# Patient Record
Sex: MALE | Race: WHITE
[De-identification: names, ages, dates, MRNs, and addresses within clinical notes are randomized per-mention and may not be internally consistent; named-entity substitution may affect disease eponyms.]

---

## 2017-03-30 ENCOUNTER — HOSPITAL ENCOUNTER (OUTPATIENT)
Dept: HOSPITAL 8 - RAD | Age: 82
Discharge: HOME | End: 2017-03-30
Attending: UROLOGY
Payer: MEDICARE

## 2017-03-30 DIAGNOSIS — Z87.442: ICD-10-CM

## 2017-03-30 DIAGNOSIS — N20.0: Primary | ICD-10-CM

## 2017-03-30 PROCEDURE — 74000: CPT

## 2017-06-05 ENCOUNTER — OFFICE VISIT (OUTPATIENT)
Dept: URGENT CARE | Facility: CLINIC | Age: 82
End: 2017-06-05
Payer: MEDICARE

## 2017-06-05 VITALS
BODY MASS INDEX: 27.17 KG/M2 | OXYGEN SATURATION: 96 % | HEART RATE: 68 BPM | SYSTOLIC BLOOD PRESSURE: 132 MMHG | WEIGHT: 205 LBS | RESPIRATION RATE: 16 BRPM | DIASTOLIC BLOOD PRESSURE: 70 MMHG | HEIGHT: 73 IN | TEMPERATURE: 98.4 F

## 2017-06-05 DIAGNOSIS — S99.929A INJURY OF NAIL BED OF TOE: ICD-10-CM

## 2017-06-05 PROCEDURE — 4040F PNEUMOC VAC/ADMIN/RCVD: CPT | Mod: 8P | Performed by: PHYSICIAN ASSISTANT

## 2017-06-05 PROCEDURE — G8432 DEP SCR NOT DOC, RNG: HCPCS | Performed by: PHYSICIAN ASSISTANT

## 2017-06-05 PROCEDURE — 1036F TOBACCO NON-USER: CPT | Performed by: PHYSICIAN ASSISTANT

## 2017-06-05 PROCEDURE — 99202 OFFICE O/P NEW SF 15 MIN: CPT | Performed by: PHYSICIAN ASSISTANT

## 2017-06-05 PROCEDURE — 1101F PT FALLS ASSESS-DOCD LE1/YR: CPT | Mod: 8P | Performed by: PHYSICIAN ASSISTANT

## 2017-06-05 PROCEDURE — 11750 EXCISION NAIL&NAIL MATRIX: CPT | Performed by: PHYSICIAN ASSISTANT

## 2017-06-05 PROCEDURE — G8419 CALC BMI OUT NRM PARAM NOF/U: HCPCS | Performed by: PHYSICIAN ASSISTANT

## 2017-06-05 RX ORDER — ENALAPRIL MALEATE 2.5 MG/1
2.5 TABLET ORAL DAILY
COMMUNITY
End: 2022-01-01

## 2017-06-05 ASSESSMENT — ENCOUNTER SYMPTOMS
COUGH: 0
NECK PAIN: 0
BACK PAIN: 1
CHILLS: 0
HEADACHES: 0
JOINT SWELLING: 0
CHANGE IN BOWEL HABIT: 0
MYALGIAS: 0
ABDOMINAL PAIN: 0

## 2017-06-05 NOTE — MR AVS SNAPSHOT
"        Delonte SILVA Catherine   2017 11:45 AM   Office Visit   MRN: [id number]    Department:  [facility]   Dept Phone:  [phone]    Description:  Male : 1935   Provider:  Simon Moreau PA-C           Reason for Visit     Nail Problem right foot big toe fungus issue    Fall right side back pain that moves down right hip      Allergies as of 2017     No Known Allergies      Vital Signs     Blood Pressure Pulse Temperature Respirations Height Weight    132/70 mmHg 68 36.9 °C (98.4 °F) 16 1.854 m (6' 1\") 92.987 kg (205 lb)    Body Mass Index Oxygen Saturation                27.05 kg/m2 96%          Basic Information     Date Of Birth Sex Race Ethnicity Preferred Language    1935 Male White Non- English      Health Maintenance     Patient has no pending health maintenance at this time      Current Immunizations     No immunizations on file.      Below and/or attached are the medications your provider expects you to take. Review all of your home medications and newly ordered medications with your provider and/or pharmacist. Follow medication instructions as directed by your provider and/or pharmacist. Please keep your medication list with you and share with your provider. Update the information when medications are discontinued, doses are changed, or new medications (including over-the-counter products) are added; and carry medication information at all times in the event of emergency situations     Allergies:  No Known Allergies          Medications  Valid as of: 2017 - 12:40 PM    Generic Name Brand Name Tablet Size Instructions for use    Enalapril Maleate (Tab) VASOTEC 2.5 MG Take 2.5 mg by mouth every day.        .                 Medicines prescribed today were sent to:     [facility]/PHARMACY #[id number] - [place] - [address]    [address] [place] [address]    Phone: [phone] Fax: [phone]    Open 24 Hours?: No      Medication refill instructions:       If your " prescription bottle indicates you have medication refills left, it is not necessary to call your provider’s office. Please contact your pharmacy and they will refill your medication.    If your prescription bottle indicates you do not have any refills left, you may request refills at any time through one of the following ways: The online Lore system (except Urgent Care), by calling your provider’s office, or by asking your pharmacy to contact your provider’s office with a refill request. Medication refills are processed only during regular business hours and may not be available until the next business day. Your provider may request additional information or to have a follow-up visit with you prior to refilling your medication.   *Please Note: Medication refills are assigned a new Rx number when refilled electronically. Your pharmacy may indicate that no refills were authorized even though a new prescription for the same medication is available at the pharmacy. Please request the medicine by name with the pharmacy before contacting your provider for a refill.           Lore Access Code: [email or web address]  Expires: 7/5/2017 12:40 PM    Your email address is not on file at [facility].  Email Addresses are required for you to sign up for Lore, please contact [phone] to verify your personal information and to provide your email address prior to attempting to register for Lore.    Delonte Alexander  [address] [place] [address]    Lore  A secure, online tool to manage your health information     [facility] [employer]’s Lore® is a secure, online tool that connects you to your personalized health information from the privacy of your home -- day or night - making it very easy for you to manage your healthcare. Once the activation process is completed, you can even access your medical information using the Lore poli, which is available for free in the Apple Poli store or Google Play store.     To learn  more about [employer], visit [email or web address]    There are two levels of access available (as shown below):   My Chart Features  [facility] Doctor [facility]  Specialists [facility]  Urgent  Care Non-[facility] Primary Care Doctor   Email your healthcare team securely and privately 24/7 X X X    Manage appointments: schedule your next appointment; view details of past/upcoming appointments X      Request prescription refills. X      View recent personal medical records, including lab and immunizations X X X X   View health record, including health history, allergies, medications X X X X   Read reports about your outpatient visits, procedures, consult and ER notes X X X X   See your discharge summary, which is a recap of your hospital and/or ER visit that includes your diagnosis, lab results, and care plan X X  X     How to register for Leaders2020:  Once your e-mail address has been verified, follow the following steps to sign up for [email or web address].     1. Go to  [email or web address]  2. Click on the Sign Up Now box, which takes you to the New Member Sign Up page. You will need to provide the following information:  a. Enter your [email or web address] Access Code exactly as it appears at the top of this page. (You will not need to use this code after you’ve completed the sign-up process. If you do not sign up before the expiration date, you must request a new code.)   b. Enter your date of birth.   c. Enter your home email address.   d. Click Submit, and follow the next screen’s instructions.  3. Create a Leaders2020 ID. This will be your Leaders2020 login ID and cannot be changed, so think of one that is secure and easy to remember.  4. Create a Leaders2020 password. You can change your password at any time.  5. Enter your Password Reset Question and Answer. This can be used at a later time if you forget your password.   6. Enter your e-mail address. This allows you to receive e-mail notifications when new information is available in  Revegy.  7. Click Sign Up. You can now view your health information.    For assistance activating your Revegy account, call [phone]

## 2017-06-08 ENCOUNTER — OFFICE VISIT (OUTPATIENT)
Dept: URGENT CARE | Facility: CLINIC | Age: 82
End: 2017-06-08
Payer: MEDICARE

## 2017-06-08 VITALS
DIASTOLIC BLOOD PRESSURE: 80 MMHG | OXYGEN SATURATION: 96 % | RESPIRATION RATE: 15 BRPM | TEMPERATURE: 98.3 F | SYSTOLIC BLOOD PRESSURE: 122 MMHG | HEART RATE: 73 BPM

## 2017-06-08 DIAGNOSIS — I10 ESSENTIAL HYPERTENSION: ICD-10-CM

## 2017-06-08 DIAGNOSIS — S91.209D TOENAIL AVULSION, SUBSEQUENT ENCOUNTER: ICD-10-CM

## 2017-06-08 PROCEDURE — 1101F PT FALLS ASSESS-DOCD LE1/YR: CPT | Mod: 8P | Performed by: FAMILY MEDICINE

## 2017-06-08 PROCEDURE — 4040F PNEUMOC VAC/ADMIN/RCVD: CPT | Mod: 8P | Performed by: FAMILY MEDICINE

## 2017-06-08 PROCEDURE — G8419 CALC BMI OUT NRM PARAM NOF/U: HCPCS | Performed by: FAMILY MEDICINE

## 2017-06-08 PROCEDURE — G8432 DEP SCR NOT DOC, RNG: HCPCS | Performed by: FAMILY MEDICINE

## 2017-06-08 PROCEDURE — 99214 OFFICE O/P EST MOD 30 MIN: CPT | Performed by: FAMILY MEDICINE

## 2017-06-08 PROCEDURE — 1036F TOBACCO NON-USER: CPT | Performed by: FAMILY MEDICINE

## 2017-06-08 RX ORDER — CEPHALEXIN 500 MG/1
500 CAPSULE ORAL 3 TIMES DAILY
Qty: 21 CAP | Refills: 0 | Status: SHIPPED | OUTPATIENT
Start: 2017-06-08 | End: 2017-06-15

## 2017-06-08 NOTE — PROGRESS NOTES
Subjective:      Delonte Alexander is a 82 y.o. male who presents with Wound Check   patient presents in follow-up after he had a partial toenail avulsion removed completely on Monday, June 5. He injured it initially on June 2. He has been changing the bandage. Applying some topical antibiotics denies any fevers or chills denies any increased pain feels that it is healing well.    Wound Check       ROS    PMH:  has no past medical history on file.  MEDS:   Current outpatient prescriptions:   •  cephALEXin (KEFLEX) 500 MG Cap, Take 1 Cap by mouth 3 times a day for 7 days., Disp: 21 Cap, Rfl: 0  •  enalapril (VASOTEC) 2.5 MG Tab, Take 2.5 mg by mouth every day., Disp: , Rfl:   ALLERGIES: No Known Allergies  SURGHX: No past surgical history on file.  SOCHX:  reports that he has never smoked. He does not have any smokeless tobacco history on file.  FH: Family history was reviewed, no pertinent findings to report   Objective:     /80 mmHg  Pulse 73  Temp(Src) 36.8 °C (98.3 °F)  Resp 15  SpO2 96%     Physical Exam   Constitutional: He is oriented to person, place, and time. He appears well-developed and well-nourished. No distress.   HENT:   Mouth/Throat: Oropharynx is clear and moist.   Cardiovascular: Normal rate.    Pulmonary/Chest: Effort normal.   Musculoskeletal: Normal range of motion. He exhibits no edema or tenderness.        Feet:    Neurological: He is alert and oriented to person, place, and time.   Skin: Skin is warm and dry. He is not diaphoretic.   Minimal erythema, no d/c   Psychiatric: He has a normal mood and affect. His behavior is normal.            Assessment/Plan:     1. Toenail avulsion, subsequent encounter    - cephALEXin (KEFLEX) 500 MG Cap; Take 1 Cap by mouth 3 times a day for 7 days.  Dispense: 21 Cap; Refill: 0  -Cont current care as area will be exposed for 4-6wks will cover with oral antibiotics for several days until initial granulation tissue appears      2. Essential  hypertension  Stable on medication continue as directed

## 2017-06-08 NOTE — MR AVS SNAPSHOT
Delonte Alexander   2017 9:00 AM   Office Visit   MRN: [id number]    Department:  [facility]   Dept Phone:  [phone]    Description:  Male : 1935   Provider:  Jessica Gutierrez D.O.           Reason for Visit     Wound Check right foot      Allergies as of 2017     No Known Allergies      You were diagnosed with     Toenail avulsion, subsequent encounter   [[id number]]       Essential hypertension   [1634479]         Vital Signs     Blood Pressure Pulse Temperature Respirations Oxygen Saturation Smoking Status    122/80 mmHg 73 36.8 °C (98.3 °F) 15 96% Never Smoker       Basic Information     Date Of Birth Sex Race Ethnicity Preferred Language    1935 Male White Non- English      Health Maintenance        Date Due Completion Dates    IMM DTaP/Tdap/Td Vaccine (1 - Tdap) 1955 ---    COLONOSCOPY 1986 ---    IMM ZOSTER VACCINE 1996 ---    IMM PNEUMOCOCCAL 65+ (ADULT) LOW/MEDIUM RISK SERIES (1 of 2 - PCV13) 2001 ---            Current Immunizations     No immunizations on file.      Below and/or attached are the medications your provider expects you to take. Review all of your home medications and newly ordered medications with your provider and/or pharmacist. Follow medication instructions as directed by your provider and/or pharmacist. Please keep your medication list with you and share with your provider. Update the information when medications are discontinued, doses are changed, or new medications (including over-the-counter products) are added; and carry medication information at all times in the event of emergency situations     Allergies:  No Known Allergies          Medications  Valid as of: 2017 -  9:20 AM    Generic Name Brand Name Tablet Size Instructions for use    Cephalexin (Cap) KEFLEX 500 MG Take 1 Cap by mouth 3 times a day for 7 days.        Enalapril Maleate (Tab) VASOTEC 2.5 MG Take 2.5 mg by mouth every day.        .                    Medicines prescribed today were sent to:     [facility]/PHARMACY #[id number] - [place], [place] - [address]    [address] [place] [address]    Phone: [phone] Fax: [phone]    Open 24 Hours?: No      Medication refill instructions:       If your prescription bottle indicates you have medication refills left, it is not necessary to call your provider’s office. Please contact your pharmacy and they will refill your medication.    If your prescription bottle indicates you do not have any refills left, you may request refills at any time through one of the following ways: The online Workhint system (except Urgent Care), by calling your provider’s office, or by asking your pharmacy to contact your provider’s office with a refill request. Medication refills are processed only during regular business hours and may not be available until the next business day. Your provider may request additional information or to have a follow-up visit with you prior to refilling your medication.   *Please Note: Medication refills are assigned a new Rx number when refilled electronically. Your pharmacy may indicate that no refills were authorized even though a new prescription for the same medication is available at the pharmacy. Please request the medicine by name with the pharmacy before contacting your provider for a refill.           Workhint Access Code: [email or web address]  Expires: 7/5/2017 12:40 PM    Your email address is not on file at [facility].  Email Addresses are required for you to sign up for Workhint, please contact [phone] to verify your personal information and to provide your email address prior to attempting to register for Workhint.    [place] [address]    Workhint  A secure, online tool to manage your health information     [employer]’s Workhint® is a secure, online tool that connects you to your personalized health information from the privacy of your home -- day or night -  making it very easy for you to manage your healthcare. Once the activation process is completed, you can even access your medical information using the FSAstore.com poli, which is available for free in the Apple Poli store or Google Play store.     To learn more about [email or web address], visit [email or web address]    There are two levels of access available (as shown below):   My Chart Features  [facility] Doctor [facility]  Specialists [facility]  Urgent  Care Non-[facility] Primary Care Doctor   Email your healthcare team securely and privately 24/7 X X X    Manage appointments: schedule your next appointment; view details of past/upcoming appointments X      Request prescription refills. X      View recent personal medical records, including lab and immunizations X X X X   View health record, including health history, allergies, medications X X X X   Read reports about your outpatient visits, procedures, consult and ER notes X X X X   See your discharge summary, which is a recap of your hospital and/or ER visit that includes your diagnosis, lab results, and care plan X X  X     How to register for [email or web address]:  Once your e-mail address has been verified, follow the following steps to sign up for [email or web address].     1. Go to  [email or web address]  2. Click on the Sign Up Now box, which takes you to the New Member Sign Up page. You will need to provide the following information:  a. Enter your [email or web address] Access Code exactly as it appears at the top of this page. (You will not need to use this code after you’ve completed the sign-up process. If you do not sign up before the expiration date, you must request a new code.)   b. Enter your date of birth.   c. Enter your home email address.   d. Click Submit, and follow the next screen’s instructions.  3. Create a MySQLt ID. This will be your FSAstore.com login ID and cannot be changed, so think of one that is secure and easy to remember.  4. Create a MySQLt password. You can change your password at  any time.  5. Enter your Password Reset Question and Answer. This can be used at a later time if you forget your password.   6. Enter your e-mail address. This allows you to receive e-mail notifications when new information is available in [employer]elligence.  7. Click Sign Up. You can now view your health information.    For assistance activating your [employer] account, call [phone]

## 2019-07-17 ENCOUNTER — HOSPITAL ENCOUNTER (OUTPATIENT)
Dept: RADIOLOGY | Facility: MEDICAL CENTER | Age: 84
End: 2019-07-17
Attending: FAMILY MEDICINE
Payer: MEDICARE

## 2019-07-17 DIAGNOSIS — M54.5 ACUTE LOW BACK PAIN, UNSPECIFIED BACK PAIN LATERALITY, WITH SCIATICA PRESENCE UNSPECIFIED: ICD-10-CM

## 2019-07-17 PROCEDURE — 72100 X-RAY EXAM L-S SPINE 2/3 VWS: CPT

## 2019-07-26 ENCOUNTER — HOSPITAL ENCOUNTER (OUTPATIENT)
Dept: HOSPITAL 8 - RAD | Age: 84
Discharge: HOME | End: 2019-07-26
Attending: UROLOGY
Payer: MEDICARE

## 2019-07-26 DIAGNOSIS — N20.0: Primary | ICD-10-CM

## 2019-07-26 DIAGNOSIS — Z87.442: ICD-10-CM

## 2019-07-26 DIAGNOSIS — I70.8: ICD-10-CM

## 2019-07-26 PROCEDURE — 74018 RADEX ABDOMEN 1 VIEW: CPT

## 2019-08-21 ENCOUNTER — HOSPITAL ENCOUNTER (OUTPATIENT)
Dept: RADIOLOGY | Facility: MEDICAL CENTER | Age: 84
End: 2019-08-21
Attending: NURSE PRACTITIONER
Payer: MEDICARE

## 2019-08-21 DIAGNOSIS — M48.56XA COLLAPSED VERTEBRA, NOT ELSEWHERE CLASSIFIED, LUMBAR REGION, INITIAL ENCOUNTER FOR FRACTURE (HCC): ICD-10-CM

## 2019-08-21 PROCEDURE — 72110 X-RAY EXAM L-2 SPINE 4/>VWS: CPT

## 2019-08-21 PROCEDURE — 72148 MRI LUMBAR SPINE W/O DYE: CPT

## 2021-01-14 DIAGNOSIS — Z23 NEED FOR VACCINATION: ICD-10-CM

## 2022-01-01 ENCOUNTER — APPOINTMENT (OUTPATIENT)
Dept: CARDIOLOGY | Facility: MEDICAL CENTER | Age: 87
DRG: 314 | End: 2022-01-01
Attending: NURSE PRACTITIONER
Payer: MEDICARE

## 2022-01-01 ENCOUNTER — HOSPICE ADMISSION (OUTPATIENT)
Dept: HOSPICE | Facility: HOSPICE | Age: 87
End: 2022-01-01
Payer: MEDICARE

## 2022-01-01 ENCOUNTER — TELEPHONE (OUTPATIENT)
Dept: CARDIOLOGY | Facility: MEDICAL CENTER | Age: 87
End: 2022-01-01

## 2022-01-01 ENCOUNTER — DOCUMENTATION (OUTPATIENT)
Dept: CARDIOLOGY | Facility: MEDICAL CENTER | Age: 87
End: 2022-01-01
Payer: MEDICARE

## 2022-01-01 ENCOUNTER — PHARMACY VISIT (OUTPATIENT)
Dept: PHARMACY | Facility: MEDICAL CENTER | Age: 87
End: 2022-01-01
Payer: COMMERCIAL

## 2022-01-01 ENCOUNTER — OFFICE VISIT (OUTPATIENT)
Dept: CARDIOLOGY | Facility: MEDICAL CENTER | Age: 87
End: 2022-01-01
Payer: MEDICARE

## 2022-01-01 ENCOUNTER — NON-PROVIDER VISIT (OUTPATIENT)
Dept: CARDIOLOGY | Facility: MEDICAL CENTER | Age: 87
End: 2022-01-01
Payer: MEDICARE

## 2022-01-01 ENCOUNTER — APPOINTMENT (OUTPATIENT)
Dept: RADIOLOGY | Facility: MEDICAL CENTER | Age: 87
DRG: 811 | End: 2022-01-01
Payer: MEDICARE

## 2022-01-01 ENCOUNTER — APPOINTMENT (OUTPATIENT)
Dept: RADIOLOGY | Facility: MEDICAL CENTER | Age: 87
DRG: 314 | End: 2022-01-01
Attending: STUDENT IN AN ORGANIZED HEALTH CARE EDUCATION/TRAINING PROGRAM
Payer: MEDICARE

## 2022-01-01 ENCOUNTER — TELEPHONE (OUTPATIENT)
Dept: CARDIOLOGY | Facility: MEDICAL CENTER | Age: 87
End: 2022-01-01
Payer: MEDICARE

## 2022-01-01 ENCOUNTER — APPOINTMENT (OUTPATIENT)
Dept: CARDIOLOGY | Facility: MEDICAL CENTER | Age: 87
DRG: 811 | End: 2022-01-01
Attending: INTERNAL MEDICINE
Payer: MEDICARE

## 2022-01-01 ENCOUNTER — HOSPITAL ENCOUNTER (INPATIENT)
Facility: MEDICAL CENTER | Age: 87
LOS: 4 days | DRG: 811 | End: 2022-03-28
Attending: EMERGENCY MEDICINE | Admitting: HOSPITALIST
Payer: MEDICARE

## 2022-01-01 ENCOUNTER — HOSPITAL ENCOUNTER (OUTPATIENT)
Facility: MEDICAL CENTER | Age: 87
End: 2022-01-01
Attending: INTERNAL MEDICINE | Admitting: INTERNAL MEDICINE
Payer: MEDICARE

## 2022-01-01 ENCOUNTER — DOCUMENTATION (OUTPATIENT)
Dept: CARDIOLOGY | Facility: MEDICAL CENTER | Age: 87
End: 2022-01-01

## 2022-01-01 ENCOUNTER — HOSPITAL ENCOUNTER (OUTPATIENT)
Dept: LAB | Facility: MEDICAL CENTER | Age: 87
End: 2022-04-05
Attending: INTERNAL MEDICINE
Payer: MEDICARE

## 2022-01-01 ENCOUNTER — HOSPITAL ENCOUNTER (OUTPATIENT)
Dept: RADIOLOGY | Facility: MEDICAL CENTER | Age: 87
End: 2022-03-17
Attending: INTERNAL MEDICINE
Payer: MEDICARE

## 2022-01-01 ENCOUNTER — APPOINTMENT (OUTPATIENT)
Dept: RADIOLOGY | Facility: MEDICAL CENTER | Age: 87
DRG: 314 | End: 2022-01-01
Attending: EMERGENCY MEDICINE
Payer: MEDICARE

## 2022-01-01 ENCOUNTER — APPOINTMENT (OUTPATIENT)
Dept: RADIOLOGY | Facility: MEDICAL CENTER | Age: 87
DRG: 811 | End: 2022-01-01
Attending: STUDENT IN AN ORGANIZED HEALTH CARE EDUCATION/TRAINING PROGRAM
Payer: MEDICARE

## 2022-01-01 ENCOUNTER — APPOINTMENT (OUTPATIENT)
Dept: RADIOLOGY | Facility: MEDICAL CENTER | Age: 87
DRG: 811 | End: 2022-01-01
Attending: INTERNAL MEDICINE
Payer: MEDICARE

## 2022-01-01 ENCOUNTER — ANESTHESIA EVENT (OUTPATIENT)
Dept: SURGERY | Facility: MEDICAL CENTER | Age: 87
DRG: 811 | End: 2022-01-01
Payer: MEDICARE

## 2022-01-01 ENCOUNTER — HOSPITAL ENCOUNTER (INPATIENT)
Facility: MEDICAL CENTER | Age: 87
LOS: 2 days | DRG: 314 | End: 2022-04-12
Attending: EMERGENCY MEDICINE | Admitting: STUDENT IN AN ORGANIZED HEALTH CARE EDUCATION/TRAINING PROGRAM
Payer: MEDICARE

## 2022-01-01 ENCOUNTER — APPOINTMENT (OUTPATIENT)
Dept: RADIOLOGY | Facility: MEDICAL CENTER | Age: 87
DRG: 314 | End: 2022-01-01
Attending: HOSPITALIST
Payer: MEDICARE

## 2022-01-01 ENCOUNTER — APPOINTMENT (OUTPATIENT)
Dept: CARDIOLOGY | Facility: MEDICAL CENTER | Age: 87
DRG: 218 | End: 2022-01-01
Attending: INTERNAL MEDICINE
Payer: MEDICARE

## 2022-01-01 ENCOUNTER — PRE-ADMISSION TESTING (OUTPATIENT)
Dept: ADMISSIONS | Facility: MEDICAL CENTER | Age: 87
DRG: 218 | End: 2022-01-01
Attending: INTERNAL MEDICINE
Payer: MEDICARE

## 2022-01-01 ENCOUNTER — HOSPITAL ENCOUNTER (OUTPATIENT)
Facility: MEDICAL CENTER | Age: 87
End: 2022-01-11
Payer: COMMERCIAL

## 2022-01-01 ENCOUNTER — OFFICE VISIT (OUTPATIENT)
Dept: CARDIOTHORACIC SURGERY | Facility: MEDICAL CENTER | Age: 87
End: 2022-01-01
Payer: MEDICARE

## 2022-01-01 ENCOUNTER — APPOINTMENT (OUTPATIENT)
Dept: RADIOLOGY | Facility: MEDICAL CENTER | Age: 87
DRG: 811 | End: 2022-01-01
Attending: EMERGENCY MEDICINE
Payer: MEDICARE

## 2022-01-01 ENCOUNTER — ANESTHESIA (OUTPATIENT)
Dept: SURGERY | Facility: MEDICAL CENTER | Age: 87
DRG: 811 | End: 2022-01-01
Payer: MEDICARE

## 2022-01-01 ENCOUNTER — HOME CARE VISIT (OUTPATIENT)
Dept: HOSPICE | Facility: HOSPICE | Age: 87
End: 2022-01-01
Payer: MEDICARE

## 2022-01-01 ENCOUNTER — APPOINTMENT (OUTPATIENT)
Dept: CARDIOLOGY | Facility: MEDICAL CENTER | Age: 87
DRG: 314 | End: 2022-01-01
Attending: EMERGENCY MEDICINE
Payer: MEDICARE

## 2022-01-01 ENCOUNTER — HOSPITAL ENCOUNTER (INPATIENT)
Facility: MEDICAL CENTER | Age: 87
LOS: 1 days | DRG: 218 | End: 2022-03-22
Attending: INTERNAL MEDICINE | Admitting: STUDENT IN AN ORGANIZED HEALTH CARE EDUCATION/TRAINING PROGRAM
Payer: MEDICARE

## 2022-01-01 ENCOUNTER — HOSPITAL ENCOUNTER (OUTPATIENT)
Dept: LAB | Facility: MEDICAL CENTER | Age: 87
DRG: 811 | End: 2022-03-23
Attending: NURSE PRACTITIONER
Payer: MEDICARE

## 2022-01-01 ENCOUNTER — APPOINTMENT (OUTPATIENT)
Dept: CARDIOLOGY | Facility: MEDICAL CENTER | Age: 87
End: 2022-01-01
Attending: INTERNAL MEDICINE
Payer: MEDICARE

## 2022-01-01 VITALS
TEMPERATURE: 97.5 F | DIASTOLIC BLOOD PRESSURE: 62 MMHG | WEIGHT: 186 LBS | HEIGHT: 72 IN | OXYGEN SATURATION: 94 % | SYSTOLIC BLOOD PRESSURE: 128 MMHG | BODY MASS INDEX: 25.19 KG/M2 | HEART RATE: 69 BPM

## 2022-01-01 VITALS
BODY MASS INDEX: 25.47 KG/M2 | WEIGHT: 188.05 LBS | SYSTOLIC BLOOD PRESSURE: 126 MMHG | HEIGHT: 72 IN | DIASTOLIC BLOOD PRESSURE: 60 MMHG | HEART RATE: 70 BPM | TEMPERATURE: 96.9 F | OXYGEN SATURATION: 95 % | RESPIRATION RATE: 27 BRPM

## 2022-01-01 VITALS
RESPIRATION RATE: 16 BRPM | HEART RATE: 83 BPM | HEIGHT: 72 IN | SYSTOLIC BLOOD PRESSURE: 110 MMHG | DIASTOLIC BLOOD PRESSURE: 52 MMHG | BODY MASS INDEX: 24.52 KG/M2 | OXYGEN SATURATION: 91 % | WEIGHT: 181 LBS

## 2022-01-01 VITALS
HEIGHT: 72 IN | RESPIRATION RATE: 16 BRPM | OXYGEN SATURATION: 91 % | DIASTOLIC BLOOD PRESSURE: 74 MMHG | BODY MASS INDEX: 24.52 KG/M2 | WEIGHT: 181 LBS | HEART RATE: 70 BPM | SYSTOLIC BLOOD PRESSURE: 126 MMHG

## 2022-01-01 VITALS
HEART RATE: 78 BPM | RESPIRATION RATE: 16 BRPM | DIASTOLIC BLOOD PRESSURE: 68 MMHG | WEIGHT: 186 LBS | OXYGEN SATURATION: 90 % | SYSTOLIC BLOOD PRESSURE: 122 MMHG | BODY MASS INDEX: 25.19 KG/M2 | HEIGHT: 72 IN

## 2022-01-01 VITALS
OXYGEN SATURATION: 88 % | DIASTOLIC BLOOD PRESSURE: 51 MMHG | BODY MASS INDEX: 27.47 KG/M2 | TEMPERATURE: 97.2 F | HEART RATE: 61 BPM | HEIGHT: 72 IN | RESPIRATION RATE: 18 BRPM | WEIGHT: 202.82 LBS | SYSTOLIC BLOOD PRESSURE: 121 MMHG

## 2022-01-01 VITALS
BODY MASS INDEX: 26.19 KG/M2 | OXYGEN SATURATION: 96 % | HEART RATE: 80 BPM | RESPIRATION RATE: 36 BRPM | HEIGHT: 72 IN | WEIGHT: 193.34 LBS | DIASTOLIC BLOOD PRESSURE: 47 MMHG | TEMPERATURE: 97.8 F | SYSTOLIC BLOOD PRESSURE: 136 MMHG

## 2022-01-01 DIAGNOSIS — I25.84 CORONARY ARTERY DISEASE DUE TO CALCIFIED CORONARY LESION: ICD-10-CM

## 2022-01-01 DIAGNOSIS — Z01.810 PRE-PROCEDURAL CARDIOVASCULAR EXAMINATION: ICD-10-CM

## 2022-01-01 DIAGNOSIS — I35.0 SEVERE AORTIC STENOSIS: ICD-10-CM

## 2022-01-01 DIAGNOSIS — I25.10 CORONARY ARTERY DISEASE DUE TO CALCIFIED CORONARY LESION: ICD-10-CM

## 2022-01-01 DIAGNOSIS — I44.7 LBBB (LEFT BUNDLE BRANCH BLOCK): ICD-10-CM
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
## 2022-01-01 DIAGNOSIS — D64.9 ANEMIA, UNSPECIFIED TYPE: ICD-10-CM

## 2022-01-01 DIAGNOSIS — R00.1 BRADYCARDIA: ICD-10-CM

## 2022-01-01 DIAGNOSIS — I35.0 NONRHEUMATIC AORTIC VALVE STENOSIS: ICD-10-CM

## 2022-01-01 DIAGNOSIS — I71.20 THORACIC AORTIC ANEURYSM WITHOUT RUPTURE (HCC): ICD-10-CM

## 2022-01-01 DIAGNOSIS — I35.0 AORTIC VALVE STENOSIS, ETIOLOGY OF CARDIAC VALVE DISEASE UNSPECIFIED: ICD-10-CM

## 2022-01-01 DIAGNOSIS — I73.9 PERIPHERAL VASCULAR DISEASE (HCC): ICD-10-CM

## 2022-01-01 DIAGNOSIS — Z98.890 STATUS POST CARDIAC CATHETERIZATION: ICD-10-CM

## 2022-01-01 DIAGNOSIS — R60.9 EDEMA, UNSPECIFIED TYPE: ICD-10-CM

## 2022-01-01 DIAGNOSIS — M79.605 LOWER EXTREMITY PAIN, BILATERAL: ICD-10-CM

## 2022-01-01 DIAGNOSIS — I44.0 FIRST DEGREE AV BLOCK: ICD-10-CM

## 2022-01-01 DIAGNOSIS — J96.01 ACUTE RESPIRATORY FAILURE WITH HYPOXEMIA (HCC): ICD-10-CM

## 2022-01-01 DIAGNOSIS — Q24.8 LEFT VENTRICULAR OUTFLOW TRACT OBSTRUCTION: ICD-10-CM

## 2022-01-01 DIAGNOSIS — I47.10 SVT (SUPRAVENTRICULAR TACHYCARDIA) (HCC): ICD-10-CM

## 2022-01-01 DIAGNOSIS — I25.10 CAD, MULTIPLE VESSEL: ICD-10-CM

## 2022-01-01 DIAGNOSIS — G89.29 CHRONIC BACK PAIN, UNSPECIFIED BACK LOCATION, UNSPECIFIED BACK PAIN LATERALITY: ICD-10-CM

## 2022-01-01 DIAGNOSIS — R09.02 HYPOXIA: ICD-10-CM

## 2022-01-01 DIAGNOSIS — I25.10 3-VESSEL CAD: ICD-10-CM

## 2022-01-01 DIAGNOSIS — I49.1 PREMATURE ATRIAL CONTRACTIONS: ICD-10-CM

## 2022-01-01 DIAGNOSIS — J92.9 PLEURAL PLAQUE: ICD-10-CM

## 2022-01-01 DIAGNOSIS — E78.5 DYSLIPIDEMIA: ICD-10-CM

## 2022-01-01 DIAGNOSIS — I73.9 PAD (PERIPHERAL ARTERY DISEASE) (HCC): ICD-10-CM

## 2022-01-01 DIAGNOSIS — R54 FRAILTY: ICD-10-CM

## 2022-01-01 DIAGNOSIS — N28.9 RENAL INSUFFICIENCY: ICD-10-CM

## 2022-01-01 DIAGNOSIS — D50.0 IRON DEFICIENCY ANEMIA DUE TO CHRONIC BLOOD LOSS: ICD-10-CM

## 2022-01-01 DIAGNOSIS — I50.9 ACUTE CONGESTIVE HEART FAILURE, UNSPECIFIED HEART FAILURE TYPE (HCC): ICD-10-CM

## 2022-01-01 DIAGNOSIS — I25.10 CORONARY ARTERY DISEASE INVOLVING NATIVE CORONARY ARTERY OF NATIVE HEART WITHOUT ANGINA PECTORIS: ICD-10-CM

## 2022-01-01 DIAGNOSIS — M54.9 CHRONIC BACK PAIN, UNSPECIFIED BACK LOCATION, UNSPECIFIED BACK PAIN LATERALITY: ICD-10-CM

## 2022-01-01 DIAGNOSIS — Z95.5 STATUS POST INSERTION OF DRUG ELUTING CORONARY ARTERY STENT: ICD-10-CM

## 2022-01-01 DIAGNOSIS — Z00.6 EXAMINATION OF PARTICIPANT IN CLINICAL TRIAL: ICD-10-CM

## 2022-01-01 DIAGNOSIS — M79.604 LOWER EXTREMITY PAIN, BILATERAL: ICD-10-CM

## 2022-01-01 DIAGNOSIS — I34.2 NONRHEUMATIC MITRAL VALVE STENOSIS: ICD-10-CM

## 2022-01-01 DIAGNOSIS — K92.2 GASTROINTESTINAL HEMORRHAGE, UNSPECIFIED GASTROINTESTINAL HEMORRHAGE TYPE: ICD-10-CM

## 2022-01-01 LAB
25(OH)D3 SERPL-MCNC: 75 NG/ML (ref 30–100)
ABO + RH BLD: NORMAL
ABO GROUP BLD: NORMAL
ABO GROUP BLD: NORMAL
ACT BLD: 160 SEC (ref 74–137)
ACT BLD: 214 SEC (ref 74–137)
ACT BLD: 231 SEC (ref 74–137)
ACT BLD: 243 SEC (ref 74–137)
ACT BLD: 255 SEC (ref 74–137)
ACT BLD: 279 SEC (ref 74–137)
ACT BLD: 279 SEC (ref 74–137)
ACT BLD: 297 SEC (ref 74–137)
ACT BLD: 321 SEC (ref 74–137)
ACT BLD: 321 SEC (ref 74–137)
ACT BLD: 339 SEC (ref 74–137)
ACT BLD: 345 SEC (ref 74–137)
ACT BLD: 374 SEC (ref 74–137)
ALBUMIN SERPL BCP-MCNC: 3.5 G/DL (ref 3.2–4.9)
ALBUMIN SERPL BCP-MCNC: 3.6 G/DL (ref 3.2–4.9)
ALBUMIN SERPL BCP-MCNC: 3.7 G/DL (ref 3.2–4.9)
ALBUMIN SERPL BCP-MCNC: 3.9 G/DL (ref 3.2–4.9)
ALBUMIN SERPL BCP-MCNC: 3.9 G/DL (ref 3.2–4.9)
ALBUMIN/GLOB SERPL: 1.3 G/DL
ALBUMIN/GLOB SERPL: 1.5 G/DL
ALBUMIN/GLOB SERPL: 1.5 G/DL
ALBUMIN/GLOB SERPL: 1.6 G/DL
ALBUMIN/GLOB SERPL: 2.3 G/DL
ALP SERPL-CCNC: 58 U/L (ref 30–99)
ALP SERPL-CCNC: 61 U/L (ref 30–99)
ALP SERPL-CCNC: 66 U/L (ref 30–99)
ALP SERPL-CCNC: 69 U/L (ref 30–99)
ALP SERPL-CCNC: 77 U/L (ref 30–99)
ALT SERPL-CCNC: 13 U/L (ref 2–50)
ALT SERPL-CCNC: 13 U/L (ref 2–50)
ALT SERPL-CCNC: 17 U/L (ref 2–50)
ALT SERPL-CCNC: 18 U/L (ref 2–50)
ALT SERPL-CCNC: 20 U/L (ref 2–50)
ANION GAP SERPL CALC-SCNC: 10 MMOL/L (ref 7–16)
ANION GAP SERPL CALC-SCNC: 10 MMOL/L (ref 7–16)
ANION GAP SERPL CALC-SCNC: 11 MMOL/L (ref 7–16)
ANION GAP SERPL CALC-SCNC: 11 MMOL/L (ref 7–16)
ANION GAP SERPL CALC-SCNC: 12 MMOL/L (ref 7–16)
ANION GAP SERPL CALC-SCNC: 14 MMOL/L (ref 7–16)
ANISOCYTOSIS BLD QL SMEAR: ABNORMAL
ANISOCYTOSIS BLD QL SMEAR: ABNORMAL
APPEARANCE FLD: NORMAL
APPEARANCE UR: CLEAR
APTT PPP: 37.4 SEC (ref 24.7–36)
APTT PPP: 38.5 SEC (ref 24.7–36)
AST SERPL-CCNC: 11 U/L (ref 12–45)
AST SERPL-CCNC: 15 U/L (ref 12–45)
AST SERPL-CCNC: 20 U/L (ref 12–45)
AST SERPL-CCNC: 21 U/L (ref 12–45)
AST SERPL-CCNC: 9 U/L (ref 12–45)
BACTERIA #/AREA URNS HPF: NEGATIVE /HPF
BARCODED ABORH UBTYP: 5100
BARCODED ABORH UBTYP: 9500
BARCODED ABORH UBTYP: 9500
BARCODED PRD CODE UBPRD: NORMAL
BARCODED UNIT NUM UBUNT: NORMAL
BASE EXCESS BLDA CALC-SCNC: -3 MMOL/L (ref -4–3)
BASOPHILS # BLD AUTO: 0.2 % (ref 0–1.8)
BASOPHILS # BLD AUTO: 0.4 % (ref 0–1.8)
BASOPHILS # BLD AUTO: 0.5 % (ref 0–1.8)
BASOPHILS # BLD AUTO: 0.5 % (ref 0–1.8)
BASOPHILS # BLD AUTO: 0.7 % (ref 0–1.8)
BASOPHILS # BLD: 0.03 K/UL (ref 0–0.12)
BASOPHILS # BLD: 0.05 K/UL (ref 0–0.12)
BASOPHILS # BLD: 0.06 K/UL (ref 0–0.12)
BILIRUB SERPL-MCNC: 0.4 MG/DL (ref 0.1–1.5)
BILIRUB SERPL-MCNC: 0.4 MG/DL (ref 0.1–1.5)
BILIRUB SERPL-MCNC: 0.5 MG/DL (ref 0.1–1.5)
BILIRUB SERPL-MCNC: 0.5 MG/DL (ref 0.1–1.5)
BILIRUB SERPL-MCNC: 0.7 MG/DL (ref 0.1–1.5)
BILIRUB UR QL STRIP.AUTO: NEGATIVE
BLD GP AB SCN SERPL QL: NORMAL
BLD GP AB SCN SERPL QL: NORMAL
BODY FLD TYPE: NORMAL
BODY TEMPERATURE: ABNORMAL DEGREES
BUN SERPL-MCNC: 35 MG/DL (ref 8–22)
BUN SERPL-MCNC: 40 MG/DL (ref 8–22)
BUN SERPL-MCNC: 41 MG/DL (ref 8–22)
BUN SERPL-MCNC: 42 MG/DL (ref 8–22)
BUN SERPL-MCNC: 42 MG/DL (ref 8–22)
BUN SERPL-MCNC: 43 MG/DL (ref 8–22)
BUN SERPL-MCNC: 52 MG/DL (ref 8–22)
BUN SERPL-MCNC: 69 MG/DL (ref 8–22)
CA-I BLD ISE-SCNC: 1.24 MMOL/L (ref 1.1–1.3)
CALCIUM SERPL-MCNC: 8.7 MG/DL (ref 8.5–10.5)
CALCIUM SERPL-MCNC: 8.7 MG/DL (ref 8.5–10.5)
CALCIUM SERPL-MCNC: 8.8 MG/DL (ref 8.5–10.5)
CALCIUM SERPL-MCNC: 8.8 MG/DL (ref 8.5–10.5)
CALCIUM SERPL-MCNC: 9 MG/DL (ref 8.5–10.5)
CALCIUM SERPL-MCNC: 9.1 MG/DL (ref 8.5–10.5)
CALCIUM SERPL-MCNC: 9.2 MG/DL (ref 8.5–10.5)
CALCIUM SERPL-MCNC: 9.3 MG/DL (ref 8.5–10.5)
CFT BLD TEG: 5.3 MIN (ref 4.6–9.1)
CFT P HPASE BLD TEG: 4.5 MIN (ref 4.3–8.3)
CHLORIDE SERPL-SCNC: 100 MMOL/L (ref 96–112)
CHLORIDE SERPL-SCNC: 101 MMOL/L (ref 96–112)
CHLORIDE SERPL-SCNC: 104 MMOL/L (ref 96–112)
CHLORIDE SERPL-SCNC: 104 MMOL/L (ref 96–112)
CHLORIDE SERPL-SCNC: 94 MMOL/L (ref 96–112)
CHLORIDE SERPL-SCNC: 95 MMOL/L (ref 96–112)
CHLORIDE SERPL-SCNC: 95 MMOL/L (ref 96–112)
CHLORIDE SERPL-SCNC: 98 MMOL/L (ref 96–112)
CLOT ANGLE BLD TEG: 77.8 DEGREES (ref 63–78)
CLOT LYSIS 30M P MA LENFR BLD TEG: 0.4 % (ref 0–2.6)
CO2 BLDA-SCNC: 28 MMOL/L (ref 20–33)
CO2 SERPL-SCNC: 23 MMOL/L (ref 20–33)
CO2 SERPL-SCNC: 25 MMOL/L (ref 20–33)
CO2 SERPL-SCNC: 25 MMOL/L (ref 20–33)
CO2 SERPL-SCNC: 27 MMOL/L (ref 20–33)
CO2 SERPL-SCNC: 28 MMOL/L (ref 20–33)
COLOR FLD: YELLOW
COLOR UR: YELLOW
COMMENT 1642: NORMAL
COMMENT 1642: NORMAL
COMPONENT R 8504R: NORMAL
COVID ORDER STATUS COVID19: NORMAL
CREAT SERPL-MCNC: 1.6 MG/DL (ref 0.5–1.4)
CREAT SERPL-MCNC: 1.67 MG/DL (ref 0.5–1.4)
CREAT SERPL-MCNC: 1.69 MG/DL (ref 0.5–1.4)
CREAT SERPL-MCNC: 1.81 MG/DL (ref 0.5–1.4)
CREAT SERPL-MCNC: 1.84 MG/DL (ref 0.5–1.4)
CREAT SERPL-MCNC: 1.91 MG/DL (ref 0.5–1.4)
CREAT SERPL-MCNC: 2.44 MG/DL (ref 0.5–1.4)
CREAT SERPL-MCNC: 3.49 MG/DL (ref 0.5–1.4)
CT.EXTRINSIC BLD ROTEM: 0.9 MIN (ref 0.8–2.1)
CYTOLOGY REG CYTOL: NORMAL
D DIMER PPP IA.FEU-MCNC: 2.39 UG/ML (FEU) (ref 0–0.5)
EKG IMPRESSION: NORMAL
EOSINOPHIL # BLD AUTO: 0.01 K/UL (ref 0–0.51)
EOSINOPHIL # BLD AUTO: 0.15 K/UL (ref 0–0.51)
EOSINOPHIL # BLD AUTO: 0.2 K/UL (ref 0–0.51)
EOSINOPHIL # BLD AUTO: 0.34 K/UL (ref 0–0.51)
EOSINOPHIL # BLD AUTO: 0.42 K/UL (ref 0–0.51)
EOSINOPHIL NFR BLD: 0.1 % (ref 0–6.9)
EOSINOPHIL NFR BLD: 1.5 % (ref 0–6.9)
EOSINOPHIL NFR BLD: 2.9 % (ref 0–6.9)
EOSINOPHIL NFR BLD: 4 % (ref 0–6.9)
EOSINOPHIL NFR BLD: 7.4 % (ref 0–6.9)
EPI CELLS #/AREA URNS HPF: NEGATIVE /HPF
ERYTHROCYTE [DISTWIDTH] IN BLOOD BY AUTOMATED COUNT: 47.8 FL (ref 35.9–50)
ERYTHROCYTE [DISTWIDTH] IN BLOOD BY AUTOMATED COUNT: 48.6 FL (ref 35.9–50)
ERYTHROCYTE [DISTWIDTH] IN BLOOD BY AUTOMATED COUNT: 49.2 FL (ref 35.9–50)
ERYTHROCYTE [DISTWIDTH] IN BLOOD BY AUTOMATED COUNT: 49.7 FL (ref 35.9–50)
ERYTHROCYTE [DISTWIDTH] IN BLOOD BY AUTOMATED COUNT: 50.3 FL (ref 35.9–50)
ERYTHROCYTE [DISTWIDTH] IN BLOOD BY AUTOMATED COUNT: 50.4 FL (ref 35.9–50)
ERYTHROCYTE [DISTWIDTH] IN BLOOD BY AUTOMATED COUNT: 50.7 FL (ref 35.9–50)
ERYTHROCYTE [DISTWIDTH] IN BLOOD BY AUTOMATED COUNT: 50.7 FL (ref 35.9–50)
ERYTHROCYTE [DISTWIDTH] IN BLOOD BY AUTOMATED COUNT: 51.1 FL (ref 35.9–50)
ERYTHROCYTE [DISTWIDTH] IN BLOOD BY AUTOMATED COUNT: 51.5 FL (ref 35.9–50)
FERRITIN SERPL-MCNC: 457 NG/ML (ref 22–322)
FLUAV RNA SPEC QL NAA+PROBE: NEGATIVE
FLUBV RNA SPEC QL NAA+PROBE: NEGATIVE
GFR SERPLBLD CREATININE-BSD FMLA CKD-EPI: 16 ML/MIN/1.73 M 2
GFR SERPLBLD CREATININE-BSD FMLA CKD-EPI: 25 ML/MIN/1.73 M 2
GFR SERPLBLD CREATININE-BSD FMLA CKD-EPI: 34 ML/MIN/1.73 M 2
GFR SERPLBLD CREATININE-BSD FMLA CKD-EPI: 35 ML/MIN/1.73 M 2
GFR SERPLBLD CREATININE-BSD FMLA CKD-EPI: 36 ML/MIN/1.73 M 2
GFR SERPLBLD CREATININE-BSD FMLA CKD-EPI: 39 ML/MIN/1.73 M 2
GFR SERPLBLD CREATININE-BSD FMLA CKD-EPI: 40 ML/MIN/1.73 M 2
GFR SERPLBLD CREATININE-BSD FMLA CKD-EPI: 42 ML/MIN/1.73 M 2
GIANT PLATELETS BLD QL SMEAR: NORMAL
GLOBULIN SER CALC-MCNC: 1.7 G/DL (ref 1.9–3.5)
GLOBULIN SER CALC-MCNC: 2.3 G/DL (ref 1.9–3.5)
GLOBULIN SER CALC-MCNC: 2.4 G/DL (ref 1.9–3.5)
GLOBULIN SER CALC-MCNC: 2.6 G/DL (ref 1.9–3.5)
GLOBULIN SER CALC-MCNC: 2.7 G/DL (ref 1.9–3.5)
GLUCOSE BLD STRIP.AUTO-MCNC: 158 MG/DL (ref 65–99)
GLUCOSE FLD-MCNC: 95 MG/DL
GLUCOSE SERPL-MCNC: 101 MG/DL (ref 65–99)
GLUCOSE SERPL-MCNC: 116 MG/DL (ref 65–99)
GLUCOSE SERPL-MCNC: 117 MG/DL (ref 65–99)
GLUCOSE SERPL-MCNC: 167 MG/DL (ref 65–99)
GLUCOSE SERPL-MCNC: 91 MG/DL (ref 65–99)
GLUCOSE SERPL-MCNC: 92 MG/DL (ref 65–99)
GLUCOSE SERPL-MCNC: 94 MG/DL (ref 65–99)
GLUCOSE SERPL-MCNC: 95 MG/DL (ref 65–99)
GLUCOSE UR STRIP.AUTO-MCNC: NEGATIVE MG/DL
GRAM STN SPEC: NORMAL
HCO3 BLDA-SCNC: 26.5 MMOL/L (ref 17–25)
HCT VFR BLD AUTO: 21.5 % (ref 42–52)
HCT VFR BLD AUTO: 23 % (ref 42–52)
HCT VFR BLD AUTO: 25.6 % (ref 42–52)
HCT VFR BLD AUTO: 26.6 % (ref 42–52)
HCT VFR BLD AUTO: 27.2 % (ref 42–52)
HCT VFR BLD AUTO: 28.6 % (ref 42–52)
HCT VFR BLD AUTO: 30.8 % (ref 42–52)
HCT VFR BLD AUTO: 31.4 % (ref 42–52)
HCT VFR BLD AUTO: 32.5 % (ref 42–52)
HCT VFR BLD AUTO: 35.9 % (ref 42–52)
HCT VFR BLD CALC: 42 % (ref 42–52)
HGB BLD-MCNC: 10 G/DL (ref 14–18)
HGB BLD-MCNC: 10.1 G/DL (ref 14–18)
HGB BLD-MCNC: 10.7 G/DL (ref 14–18)
HGB BLD-MCNC: 14.3 G/DL (ref 14–18)
HGB BLD-MCNC: 6.7 G/DL (ref 14–18)
HGB BLD-MCNC: 7.1 G/DL (ref 14–18)
HGB BLD-MCNC: 7.2 G/DL (ref 14–18)
HGB BLD-MCNC: 8.1 G/DL (ref 14–18)
HGB BLD-MCNC: 9 G/DL (ref 14–18)
HGB BLD-MCNC: 9.4 G/DL (ref 14–18)
HGB BLD-MCNC: 9.7 G/DL (ref 14–18)
HGB RETIC QN AUTO: 29.1 PG/CELL (ref 29–35)
HISTIOCYTES NFR FLD: 10 %
HYALINE CASTS #/AREA URNS LPF: ABNORMAL /LPF
IMM GRANULOCYTES # BLD AUTO: 0.03 K/UL (ref 0–0.11)
IMM GRANULOCYTES # BLD AUTO: 0.05 K/UL (ref 0–0.11)
IMM GRANULOCYTES # BLD AUTO: 0.05 K/UL (ref 0–0.11)
IMM GRANULOCYTES # BLD AUTO: 0.06 K/UL (ref 0–0.11)
IMM GRANULOCYTES # BLD AUTO: 0.06 K/UL (ref 0–0.11)
IMM GRANULOCYTES NFR BLD AUTO: 0.3 % (ref 0–0.9)
IMM GRANULOCYTES NFR BLD AUTO: 0.5 % (ref 0–0.9)
IMM GRANULOCYTES NFR BLD AUTO: 0.6 % (ref 0–0.9)
IMM GRANULOCYTES NFR BLD AUTO: 0.7 % (ref 0–0.9)
IMM GRANULOCYTES NFR BLD AUTO: 1.1 % (ref 0–0.9)
IMM RETICS NFR: 20.7 % (ref 9.3–17.4)
INR PPP: 1.16 (ref 0.87–1.13)
INR PPP: 1.21 (ref 0.87–1.13)
INR PPP: 1.55 (ref 0.87–1.13)
IRON SATN MFR SERPL: 13 % (ref 15–55)
IRON SATN MFR SERPL: 23 % (ref 15–55)
IRON SERPL-MCNC: 25 UG/DL (ref 50–180)
IRON SERPL-MCNC: 39 UG/DL (ref 50–180)
KETONES UR STRIP.AUTO-MCNC: NEGATIVE MG/DL
LDH FLD L TO P-CCNC: 136 U/L
LDH SERPL L TO P-CCNC: 208 U/L (ref 107–266)
LEUKOCYTE ESTERASE UR QL STRIP.AUTO: NEGATIVE
LIPASE SERPL-CCNC: 15 U/L (ref 11–82)
LV EJECT FRACT  99904: 55
LV EJECT FRACT MOD 2C 99903: 43.83
LV EJECT FRACT MOD 2C 99903: 53.17
LV EJECT FRACT MOD 4C 99902: 53.76
LV EJECT FRACT MOD 4C 99902: 54.39
LV EJECT FRACT MOD BP 99901: 51.28
LV EJECT FRACT MOD BP 99901: 53.47
LYMPHOCYTES # BLD AUTO: 0.29 K/UL (ref 1–4.8)
LYMPHOCYTES # BLD AUTO: 0.31 K/UL (ref 1–4.8)
LYMPHOCYTES # BLD AUTO: 0.34 K/UL (ref 1–4.8)
LYMPHOCYTES # BLD AUTO: 0.43 K/UL (ref 1–4.8)
LYMPHOCYTES # BLD AUTO: 0.56 K/UL (ref 1–4.8)
LYMPHOCYTES NFR BLD: 2.2 % (ref 22–41)
LYMPHOCYTES NFR BLD: 3.1 % (ref 22–41)
LYMPHOCYTES NFR BLD: 4.9 % (ref 22–41)
LYMPHOCYTES NFR BLD: 5 % (ref 22–41)
LYMPHOCYTES NFR BLD: 9.8 % (ref 22–41)
LYMPHOCYTES NFR FLD: 5 %
MACROCYTES BLD QL SMEAR: ABNORMAL
MACROCYTES BLD QL SMEAR: ABNORMAL
MAGNESIUM SERPL-MCNC: 1.4 MG/DL (ref 1.5–2.5)
MCF BLD TEG: 68.5 MM (ref 52–69)
MCF.PLATELET INHIB BLD ROTEM: 38.9 MM (ref 15–32)
MCH RBC QN AUTO: 28.3 PG (ref 27–33)
MCH RBC QN AUTO: 28.7 PG (ref 27–33)
MCH RBC QN AUTO: 28.8 PG (ref 27–33)
MCH RBC QN AUTO: 29 PG (ref 27–33)
MCH RBC QN AUTO: 29.1 PG (ref 27–33)
MCH RBC QN AUTO: 29.2 PG (ref 27–33)
MCH RBC QN AUTO: 29.2 PG (ref 27–33)
MCH RBC QN AUTO: 29.3 PG (ref 27–33)
MCHC RBC AUTO-ENTMCNC: 29.8 G/DL (ref 33.7–35.3)
MCHC RBC AUTO-ENTMCNC: 29.8 G/DL (ref 33.7–35.3)
MCHC RBC AUTO-ENTMCNC: 29.9 G/DL (ref 33.7–35.3)
MCHC RBC AUTO-ENTMCNC: 30.5 G/DL (ref 33.7–35.3)
MCHC RBC AUTO-ENTMCNC: 30.9 G/DL (ref 33.7–35.3)
MCHC RBC AUTO-ENTMCNC: 31.1 G/DL (ref 33.7–35.3)
MCHC RBC AUTO-ENTMCNC: 31.2 G/DL (ref 33.7–35.3)
MCHC RBC AUTO-ENTMCNC: 31.5 G/DL (ref 33.7–35.3)
MCHC RBC AUTO-ENTMCNC: 31.6 G/DL (ref 33.7–35.3)
MCHC RBC AUTO-ENTMCNC: 32.5 G/DL (ref 33.7–35.3)
MCV RBC AUTO: 89.3 FL (ref 81.4–97.8)
MCV RBC AUTO: 92.4 FL (ref 81.4–97.8)
MCV RBC AUTO: 92.9 FL (ref 81.4–97.8)
MCV RBC AUTO: 93.7 FL (ref 81.4–97.8)
MCV RBC AUTO: 93.9 FL (ref 81.4–97.8)
MCV RBC AUTO: 93.9 FL (ref 81.4–97.8)
MCV RBC AUTO: 95.1 FL (ref 81.4–97.8)
MCV RBC AUTO: 95.3 FL (ref 81.4–97.8)
MCV RBC AUTO: 96 FL (ref 81.4–97.8)
MCV RBC AUTO: 96.5 FL (ref 81.4–97.8)
MESOTHL CELL NFR FLD: 1 %
MICRO URNS: ABNORMAL
MICROCYTES BLD QL SMEAR: ABNORMAL
MICROCYTES BLD QL SMEAR: ABNORMAL
MONOCYTES # BLD AUTO: 0.49 K/UL (ref 0–0.85)
MONOCYTES # BLD AUTO: 0.58 K/UL (ref 0–0.85)
MONOCYTES # BLD AUTO: 0.66 K/UL (ref 0–0.85)
MONOCYTES # BLD AUTO: 1.07 K/UL (ref 0–0.85)
MONOCYTES # BLD AUTO: 1.14 K/UL (ref 0–0.85)
MONOCYTES NFR BLD AUTO: 10.2 % (ref 0–13.4)
MONOCYTES NFR BLD AUTO: 10.7 % (ref 0–13.4)
MONOCYTES NFR BLD AUTO: 7 % (ref 0–13.4)
MONOCYTES NFR BLD AUTO: 7.7 % (ref 0–13.4)
MONOCYTES NFR BLD AUTO: 8.7 % (ref 0–13.4)
MONONUC CELLS NFR FLD: 14 %
MORPHOLOGY BLD-IMP: NORMAL
MORPHOLOGY BLD-IMP: NORMAL
NEUTROPHILS # BLD AUTO: 11.61 K/UL (ref 1.82–7.42)
NEUTROPHILS # BLD AUTO: 4.06 K/UL (ref 1.82–7.42)
NEUTROPHILS # BLD AUTO: 5.89 K/UL (ref 1.82–7.42)
NEUTROPHILS # BLD AUTO: 6.98 K/UL (ref 1.82–7.42)
NEUTROPHILS # BLD AUTO: 8.4 K/UL (ref 1.82–7.42)
NEUTROPHILS NFR BLD: 71 % (ref 44–72)
NEUTROPHILS NFR BLD: 82 % (ref 44–72)
NEUTROPHILS NFR BLD: 83.9 % (ref 44–72)
NEUTROPHILS NFR BLD: 84.1 % (ref 44–72)
NEUTROPHILS NFR BLD: 88.3 % (ref 44–72)
NEUTROPHILS NFR FLD: 70 %
NITRITE UR QL STRIP.AUTO: NEGATIVE
NRBC # BLD AUTO: 0 K/UL
NRBC BLD-RTO: 0 /100 WBC
NT-PROBNP SERPL IA-MCNC: 7391 PG/ML (ref 0–125)
NT-PROBNP SERPL IA-MCNC: 9621 PG/ML (ref 0–125)
PATHOLOGY CONSULT NOTE: NORMAL
PCO2 BLDA: 65.5 MMHG (ref 26–37)
PH BLDA: 7.21 [PH] (ref 7.4–7.5)
PH FLD: 8 [PH]
PH UR STRIP.AUTO: 5 [PH] (ref 5–8)
PHOSPHATE SERPL-MCNC: 3.1 MG/DL (ref 2.5–4.5)
PLATELET # BLD AUTO: 101 K/UL (ref 164–446)
PLATELET # BLD AUTO: 102 K/UL (ref 164–446)
PLATELET # BLD AUTO: 123 K/UL (ref 164–446)
PLATELET # BLD AUTO: 127 K/UL (ref 164–446)
PLATELET # BLD AUTO: 152 K/UL (ref 164–446)
PLATELET # BLD AUTO: 156 K/UL (ref 164–446)
PLATELET # BLD AUTO: 85 K/UL (ref 164–446)
PLATELET # BLD AUTO: 95 K/UL (ref 164–446)
PLATELET # BLD AUTO: 96 K/UL (ref 164–446)
PLATELET # BLD AUTO: 98 K/UL (ref 164–446)
PLATELET BLD QL SMEAR: NORMAL
PLATELET BLD QL SMEAR: NORMAL
PMV BLD AUTO: 13.1 FL (ref 9–12.9)
PMV BLD AUTO: 13.6 FL (ref 9–12.9)
PMV BLD AUTO: 13.8 FL (ref 9–12.9)
PMV BLD AUTO: 13.9 FL (ref 9–12.9)
PMV BLD AUTO: 14.1 FL (ref 9–12.9)
PMV BLD AUTO: 14.3 FL (ref 9–12.9)
PMV BLD AUTO: 14.3 FL (ref 9–12.9)
PO2 BLDA: 116 MMHG (ref 64–87)
POLYCHROMASIA BLD QL SMEAR: NORMAL
POLYCHROMASIA BLD QL SMEAR: NORMAL
POTASSIUM BLD-SCNC: 4.4 MMOL/L (ref 3.6–5.5)
POTASSIUM SERPL-SCNC: 4.1 MMOL/L (ref 3.6–5.5)
POTASSIUM SERPL-SCNC: 4.5 MMOL/L (ref 3.6–5.5)
POTASSIUM SERPL-SCNC: 4.6 MMOL/L (ref 3.6–5.5)
POTASSIUM SERPL-SCNC: 4.6 MMOL/L (ref 3.6–5.5)
POTASSIUM SERPL-SCNC: 4.8 MMOL/L (ref 3.6–5.5)
POTASSIUM SERPL-SCNC: 5 MMOL/L (ref 3.6–5.5)
POTASSIUM SERPL-SCNC: 5.1 MMOL/L (ref 3.6–5.5)
POTASSIUM SERPL-SCNC: 5.9 MMOL/L (ref 3.6–5.5)
PROCALCITONIN SERPL-MCNC: 0.11 NG/ML
PRODUCT TYPE UPROD: NORMAL
PROT FLD-MCNC: 2.4 G/DL
PROT SERPL-MCNC: 5.6 G/DL (ref 6–8.2)
PROT SERPL-MCNC: 6 G/DL (ref 6–8.2)
PROT SERPL-MCNC: 6 G/DL (ref 6–8.2)
PROT SERPL-MCNC: 6.2 G/DL (ref 6–8.2)
PROT SERPL-MCNC: 6.5 G/DL (ref 6–8.2)
PROT UR QL STRIP: 30 MG/DL
PROTHROMBIN TIME: 14.5 SEC (ref 12–14.6)
PROTHROMBIN TIME: 14.9 SEC (ref 12–14.6)
PROTHROMBIN TIME: 18.1 SEC (ref 12–14.6)
RBC # BLD AUTO: 2.29 M/UL (ref 4.7–6.1)
RBC # BLD AUTO: 2.45 M/UL (ref 4.7–6.1)
RBC # BLD AUTO: 2.77 M/UL (ref 4.7–6.1)
RBC # BLD AUTO: 2.79 M/UL (ref 4.7–6.1)
RBC # BLD AUTO: 2.86 M/UL (ref 4.7–6.1)
RBC # BLD AUTO: 3.08 M/UL (ref 4.7–6.1)
RBC # BLD AUTO: 3.27 M/UL (ref 4.7–6.1)
RBC # BLD AUTO: 3.45 M/UL (ref 4.7–6.1)
RBC # BLD AUTO: 3.47 M/UL (ref 4.7–6.1)
RBC # BLD AUTO: 3.72 M/UL (ref 4.7–6.1)
RBC # FLD: 4000 CELLS/UL
RBC # URNS HPF: ABNORMAL /HPF
RBC BLD AUTO: PRESENT
RBC BLD AUTO: PRESENT
RBC UR QL AUTO: NEGATIVE
RETICS # AUTO: 0.03 M/UL (ref 0.04–0.06)
RETICS/RBC NFR: 1.2 % (ref 0.8–2.1)
RH BLD: NORMAL
RH BLD: NORMAL
RSV RNA SPEC QL NAA+PROBE: NEGATIVE
SAO2 % BLDA: 97 % (ref 93–99)
SARS-COV+SARS-COV-2 AG RESP QL IA.RAPID: NOTDETECTED
SARS-COV-2 RNA RESP QL NAA+PROBE: DETECTED
SARS-COV-2 RNA RESP QL NAA+PROBE: DETECTED
SIGNIFICANT IND 70042: NORMAL
SITE SITE: NORMAL
SODIUM BLD-SCNC: 137 MMOL/L (ref 135–145)
SODIUM SERPL-SCNC: 133 MMOL/L (ref 135–145)
SODIUM SERPL-SCNC: 135 MMOL/L (ref 135–145)
SODIUM SERPL-SCNC: 136 MMOL/L (ref 135–145)
SODIUM SERPL-SCNC: 136 MMOL/L (ref 135–145)
SODIUM SERPL-SCNC: 137 MMOL/L (ref 135–145)
SODIUM SERPL-SCNC: 137 MMOL/L (ref 135–145)
SODIUM SERPL-SCNC: 139 MMOL/L (ref 135–145)
SODIUM SERPL-SCNC: 139 MMOL/L (ref 135–145)
SOURCE SOURCE: NORMAL
SP GR UR STRIP.AUTO: 1.01
SPECIMEN DRAWN FROM PATIENT: ABNORMAL
SPECIMEN SOURCE: ABNORMAL
SPECIMEN SOURCE: ABNORMAL
SPECIMEN SOURCE: NORMAL
TEG ALGORITHM TGALG: ABNORMAL
TIBC SERPL-MCNC: 172 UG/DL (ref 250–450)
TIBC SERPL-MCNC: 187 UG/DL (ref 250–450)
TROPONIN T SERPL-MCNC: 104 NG/L (ref 6–19)
TROPONIN T SERPL-MCNC: 116 NG/L (ref 6–19)
TROPONIN T SERPL-MCNC: 311 NG/L (ref 6–19)
TROPONIN T SERPL-MCNC: 322 NG/L (ref 6–19)
TROPONIN T SERPL-MCNC: 331 NG/L (ref 6–19)
TROPONIN T SERPL-MCNC: 85 NG/L (ref 6–19)
TSH SERPL DL<=0.005 MIU/L-ACNC: 1.05 UIU/ML (ref 0.38–5.33)
UIBC SERPL-MCNC: 133 UG/DL (ref 110–370)
UIBC SERPL-MCNC: 162 UG/DL (ref 110–370)
UNIT STATUS USTAT: NORMAL
UROBILINOGEN UR STRIP.AUTO-MCNC: 1 MG/DL
VIT B12 SERPL-MCNC: 437 PG/ML (ref 211–911)
WBC # BLD AUTO: 10 K/UL (ref 4.8–10.8)
WBC # BLD AUTO: 13.1 K/UL (ref 4.8–10.8)
WBC # BLD AUTO: 5.7 K/UL (ref 4.8–10.8)
WBC # BLD AUTO: 7 K/UL (ref 4.8–10.8)
WBC # BLD AUTO: 7.4 K/UL (ref 4.8–10.8)
WBC # BLD AUTO: 8 K/UL (ref 4.8–10.8)
WBC # BLD AUTO: 8.3 K/UL (ref 4.8–10.8)
WBC # BLD AUTO: 8.5 K/UL (ref 4.8–10.8)
WBC # BLD AUTO: 8.6 K/UL (ref 4.8–10.8)
WBC # BLD AUTO: 9.7 K/UL (ref 4.8–10.8)
WBC # FLD: 1670 CELLS/UL
WBC #/AREA URNS HPF: ABNORMAL /HPF

## 2022-01-01 PROCEDURE — 71045 X-RAY EXAM CHEST 1 VIEW: CPT

## 2022-01-01 PROCEDURE — C9113 INJ PANTOPRAZOLE SODIUM, VIA: HCPCS | Performed by: STUDENT IN AN ORGANIZED HEALTH CARE EDUCATION/TRAINING PROGRAM

## 2022-01-01 PROCEDURE — 36415 COLL VENOUS BLD VENIPUNCTURE: CPT | Mod: GA

## 2022-01-01 PROCEDURE — 83540 ASSAY OF IRON: CPT

## 2022-01-01 PROCEDURE — 700111 HCHG RX REV CODE 636 W/ 250 OVERRIDE (IP)

## 2022-01-01 PROCEDURE — 160002 HCHG RECOVERY MINUTES (STAT): Performed by: INTERNAL MEDICINE

## 2022-01-01 PROCEDURE — 93005 ELECTROCARDIOGRAM TRACING: CPT | Performed by: INTERNAL MEDICINE

## 2022-01-01 PROCEDURE — 93010 ELECTROCARDIOGRAM REPORT: CPT | Performed by: INTERNAL MEDICINE

## 2022-01-01 PROCEDURE — 770000 HCHG ROOM/CARE - INTERMEDIATE ICU *

## 2022-01-01 PROCEDURE — 86850 RBC ANTIBODY SCREEN: CPT

## 2022-01-01 PROCEDURE — 85025 COMPLETE CBC W/AUTO DIFF WBC: CPT

## 2022-01-01 PROCEDURE — 80053 COMPREHEN METABOLIC PANEL: CPT

## 2022-01-01 PROCEDURE — 700111 HCHG RX REV CODE 636 W/ 250 OVERRIDE (IP): Performed by: HOSPITALIST

## 2022-01-01 PROCEDURE — 97162 PT EVAL MOD COMPLEX 30 MIN: CPT

## 2022-01-01 PROCEDURE — 87015 SPECIMEN INFECT AGNT CONCNTJ: CPT

## 2022-01-01 PROCEDURE — 94640 AIRWAY INHALATION TREATMENT: CPT

## 2022-01-01 PROCEDURE — 93925 LOWER EXTREMITY STUDY: CPT

## 2022-01-01 PROCEDURE — 700102 HCHG RX REV CODE 250 W/ 637 OVERRIDE(OP)

## 2022-01-01 PROCEDURE — 93005 ELECTROCARDIOGRAM TRACING: CPT

## 2022-01-01 PROCEDURE — 700101 HCHG RX REV CODE 250: Performed by: PHYSICIAN ASSISTANT

## 2022-01-01 PROCEDURE — 33990 INSJ PERQ VAD L HRT ARTERIAL: CPT | Mod: GZ | Performed by: INTERNAL MEDICINE

## 2022-01-01 PROCEDURE — 700102 HCHG RX REV CODE 250 W/ 637 OVERRIDE(OP): Performed by: STUDENT IN AN ORGANIZED HEALTH CARE EDUCATION/TRAINING PROGRAM

## 2022-01-01 PROCEDURE — 83690 ASSAY OF LIPASE: CPT

## 2022-01-01 PROCEDURE — 88305 TISSUE EXAM BY PATHOLOGIST: CPT

## 2022-01-01 PROCEDURE — 502240 HCHG MISC OR SUPPLY RC 0272: Performed by: INTERNAL MEDICINE

## 2022-01-01 PROCEDURE — 700111 HCHG RX REV CODE 636 W/ 250 OVERRIDE (IP): Performed by: INTERNAL MEDICINE

## 2022-01-01 PROCEDURE — 93458 L HRT ARTERY/VENTRICLE ANGIO: CPT | Mod: 26,59 | Performed by: INTERNAL MEDICINE

## 2022-01-01 PROCEDURE — 82607 VITAMIN B-12: CPT

## 2022-01-01 PROCEDURE — 32555 ASPIRATE PLEURA W/ IMAGING: CPT | Mod: LT

## 2022-01-01 PROCEDURE — 36415 COLL VENOUS BLD VENIPUNCTURE: CPT

## 2022-01-01 PROCEDURE — 82962 GLUCOSE BLOOD TEST: CPT

## 2022-01-01 PROCEDURE — 99285 EMERGENCY DEPT VISIT HI MDM: CPT

## 2022-01-01 PROCEDURE — 74177 CT ABD & PELVIS W/CONTRAST: CPT | Mod: MG

## 2022-01-01 PROCEDURE — 92979 ENDOLUMINL IVUS OCT C EA: CPT | Mod: 26,LD | Performed by: INTERNAL MEDICINE

## 2022-01-01 PROCEDURE — 99205 OFFICE O/P NEW HI 60 MIN: CPT | Performed by: INTERNAL MEDICINE

## 2022-01-01 PROCEDURE — 86901 BLOOD TYPING SEROLOGIC RH(D): CPT

## 2022-01-01 PROCEDURE — B240ZZ3 ULTRASONOGRAPHY OF SINGLE CORONARY ARTERY, INTRAVASCULAR: ICD-10-PCS | Performed by: INTERNAL MEDICINE

## 2022-01-01 PROCEDURE — 80048 BASIC METABOLIC PNL TOTAL CA: CPT

## 2022-01-01 PROCEDURE — 160009 HCHG ANES TIME/MIN: Performed by: INTERNAL MEDICINE

## 2022-01-01 PROCEDURE — 700102 HCHG RX REV CODE 250 W/ 637 OVERRIDE(OP): Performed by: INTERNAL MEDICINE

## 2022-01-01 PROCEDURE — 84100 ASSAY OF PHOSPHORUS: CPT

## 2022-01-01 PROCEDURE — 99223 1ST HOSP IP/OBS HIGH 75: CPT | Performed by: INTERNAL MEDICINE

## 2022-01-01 PROCEDURE — 700102 HCHG RX REV CODE 250 W/ 637 OVERRIDE(OP): Performed by: EMERGENCY MEDICINE

## 2022-01-01 PROCEDURE — A9270 NON-COVERED ITEM OR SERVICE: HCPCS | Performed by: STUDENT IN AN ORGANIZED HEALTH CARE EDUCATION/TRAINING PROGRAM

## 2022-01-01 PROCEDURE — 97165 OT EVAL LOW COMPLEX 30 MIN: CPT

## 2022-01-01 PROCEDURE — 86923 COMPATIBILITY TEST ELECTRIC: CPT

## 2022-01-01 PROCEDURE — A9270 NON-COVERED ITEM OR SERVICE: HCPCS | Performed by: HOSPITALIST

## 2022-01-01 PROCEDURE — C1887 CATHETER, GUIDING: HCPCS

## 2022-01-01 PROCEDURE — 93325 DOPPLER ECHO COLOR FLOW MAPG: CPT | Mod: 26 | Performed by: INTERNAL MEDICINE

## 2022-01-01 PROCEDURE — 84443 ASSAY THYROID STIM HORMONE: CPT

## 2022-01-01 PROCEDURE — 700111 HCHG RX REV CODE 636 W/ 250 OVERRIDE (IP): Performed by: ANESTHESIOLOGY

## 2022-01-01 PROCEDURE — 85347 COAGULATION TIME ACTIVATED: CPT

## 2022-01-01 PROCEDURE — 36430 TRANSFUSION BLD/BLD COMPNT: CPT

## 2022-01-01 PROCEDURE — 87426 SARSCOV CORONAVIRUS AG IA: CPT

## 2022-01-01 PROCEDURE — 700102 HCHG RX REV CODE 250 W/ 637 OVERRIDE(OP): Performed by: HOSPITALIST

## 2022-01-01 PROCEDURE — 85384 FIBRINOGEN ACTIVITY: CPT

## 2022-01-01 PROCEDURE — 700111 HCHG RX REV CODE 636 W/ 250 OVERRIDE (IP): Performed by: STUDENT IN AN ORGANIZED HEALTH CARE EDUCATION/TRAINING PROGRAM

## 2022-01-01 PROCEDURE — 82330 ASSAY OF CALCIUM: CPT

## 2022-01-01 PROCEDURE — 83986 ASSAY PH BODY FLUID NOS: CPT

## 2022-01-01 PROCEDURE — 30233N1 TRANSFUSION OF NONAUTOLOGOUS RED BLOOD CELLS INTO PERIPHERAL VEIN, PERCUTANEOUS APPROACH: ICD-10-PCS | Performed by: HOSPITALIST

## 2022-01-01 PROCEDURE — 83550 IRON BINDING TEST: CPT

## 2022-01-01 PROCEDURE — P9016 RBC LEUKOCYTES REDUCED: HCPCS

## 2022-01-01 PROCEDURE — A9270 NON-COVERED ITEM OR SERVICE: HCPCS

## 2022-01-01 PROCEDURE — 93000 ELECTROCARDIOGRAM COMPLETE: CPT | Performed by: INTERNAL MEDICINE

## 2022-01-01 PROCEDURE — 93224 XTRNL ECG REC UP TO 48 HRS: CPT | Performed by: INTERNAL MEDICINE

## 2022-01-01 PROCEDURE — 99233 SBSQ HOSP IP/OBS HIGH 50: CPT | Performed by: INTERNAL MEDICINE

## 2022-01-01 PROCEDURE — 700102 HCHG RX REV CODE 250 W/ 637 OVERRIDE(OP): Performed by: PHYSICIAN ASSISTANT

## 2022-01-01 PROCEDURE — 94760 N-INVAS EAR/PLS OXIMETRY 1: CPT

## 2022-01-01 PROCEDURE — 99233 SBSQ HOSP IP/OBS HIGH 50: CPT | Mod: GC | Performed by: HOSPITALIST

## 2022-01-01 PROCEDURE — 87205 SMEAR GRAM STAIN: CPT

## 2022-01-01 PROCEDURE — 99205 OFFICE O/P NEW HI 60 MIN: CPT | Performed by: THORACIC SURGERY (CARDIOTHORACIC VASCULAR SURGERY)

## 2022-01-01 PROCEDURE — 85730 THROMBOPLASTIN TIME PARTIAL: CPT

## 2022-01-01 PROCEDURE — 99152 MOD SED SAME PHYS/QHP 5/>YRS: CPT

## 2022-01-01 PROCEDURE — 85610 PROTHROMBIN TIME: CPT

## 2022-01-01 PROCEDURE — 84157 ASSAY OF PROTEIN OTHER: CPT

## 2022-01-01 PROCEDURE — 92928 PRQ TCAT PLMT NTRAC ST 1 LES: CPT | Mod: 59,LD | Performed by: INTERNAL MEDICINE

## 2022-01-01 PROCEDURE — 160208 HCHG ENDO MINUTES - EA ADDL 1 MIN LEVEL 4: Performed by: INTERNAL MEDICINE

## 2022-01-01 PROCEDURE — 99223 1ST HOSP IP/OBS HIGH 75: CPT | Mod: AI,GC | Performed by: HOSPITALIST

## 2022-01-01 PROCEDURE — 88112 CYTOPATH CELL ENHANCE TECH: CPT

## 2022-01-01 PROCEDURE — 85018 HEMOGLOBIN: CPT

## 2022-01-01 PROCEDURE — A9270 NON-COVERED ITEM OR SERVICE: HCPCS | Performed by: PHYSICIAN ASSISTANT

## 2022-01-01 PROCEDURE — 700105 HCHG RX REV CODE 258: Performed by: INTERNAL MEDICINE

## 2022-01-01 PROCEDURE — 160203 HCHG ENDO MINUTES - 1ST 30 MINS LEVEL 4: Performed by: INTERNAL MEDICINE

## 2022-01-01 PROCEDURE — 700117 HCHG RX CONTRAST REV CODE 255: Performed by: EMERGENCY MEDICINE

## 2022-01-01 PROCEDURE — 99223 1ST HOSP IP/OBS HIGH 75: CPT | Mod: AI | Performed by: STUDENT IN AN ORGANIZED HEALTH CARE EDUCATION/TRAINING PROGRAM

## 2022-01-01 PROCEDURE — 82803 BLOOD GASES ANY COMBINATION: CPT

## 2022-01-01 PROCEDURE — 93312 ECHO TRANSESOPHAGEAL: CPT | Mod: 26 | Performed by: INTERNAL MEDICINE

## 2022-01-01 PROCEDURE — 82306 VITAMIN D 25 HYDROXY: CPT

## 2022-01-01 PROCEDURE — 93306 TTE W/DOPPLER COMPLETE: CPT | Mod: 26 | Performed by: INTERNAL MEDICINE

## 2022-01-01 PROCEDURE — A9270 NON-COVERED ITEM OR SERVICE: HCPCS | Performed by: EMERGENCY MEDICINE

## 2022-01-01 PROCEDURE — 770020 HCHG ROOM/CARE - TELE (206)

## 2022-01-01 PROCEDURE — 36556 INSERT NON-TUNNEL CV CATH: CPT | Performed by: INTERNAL MEDICINE

## 2022-01-01 PROCEDURE — 99233 SBSQ HOSP IP/OBS HIGH 50: CPT | Mod: 25,GC | Performed by: INTERNAL MEDICINE

## 2022-01-01 PROCEDURE — 99497 ADVNCD CARE PLAN 30 MIN: CPT | Performed by: HOSPITALIST

## 2022-01-01 PROCEDURE — 82728 ASSAY OF FERRITIN: CPT

## 2022-01-01 PROCEDURE — 93005 ELECTROCARDIOGRAM TRACING: CPT | Performed by: HOSPITALIST

## 2022-01-01 PROCEDURE — 89051 BODY FLUID CELL COUNT: CPT

## 2022-01-01 PROCEDURE — 700117 HCHG RX CONTRAST REV CODE 255: Performed by: INTERNAL MEDICINE

## 2022-01-01 PROCEDURE — 700105 HCHG RX REV CODE 258: Performed by: STUDENT IN AN ORGANIZED HEALTH CARE EDUCATION/TRAINING PROGRAM

## 2022-01-01 PROCEDURE — 93308 TTE F-UP OR LMTD: CPT

## 2022-01-01 PROCEDURE — 307059 PAD,EAR PROTECTOR: Performed by: STUDENT IN AN ORGANIZED HEALTH CARE EDUCATION/TRAINING PROGRAM

## 2022-01-01 PROCEDURE — 83880 ASSAY OF NATRIURETIC PEPTIDE: CPT

## 2022-01-01 PROCEDURE — A9270 NON-COVERED ITEM OR SERVICE: HCPCS | Performed by: INTERNAL MEDICINE

## 2022-01-01 PROCEDURE — 97116 GAIT TRAINING THERAPY: CPT

## 2022-01-01 PROCEDURE — 0DB68ZX EXCISION OF STOMACH, VIA NATURAL OR ARTIFICIAL OPENING ENDOSCOPIC, DIAGNOSTIC: ICD-10-PCS | Performed by: INTERNAL MEDICINE

## 2022-01-01 PROCEDURE — 83615 LACTATE (LD) (LDH) ENZYME: CPT

## 2022-01-01 PROCEDURE — 92960 CARDIOVERSION ELECTRIC EXT: CPT

## 2022-01-01 PROCEDURE — 93926 LOWER EXTREMITY STUDY: CPT | Mod: RT

## 2022-01-01 PROCEDURE — 99232 SBSQ HOSP IP/OBS MODERATE 35: CPT | Performed by: HOSPITALIST

## 2022-01-01 PROCEDURE — 02HA3RJ INSERTION OF SHORT-TERM EXTERNAL HEART ASSIST SYSTEM INTO HEART, INTRAOPERATIVE, PERCUTANEOUS APPROACH: ICD-10-PCS | Performed by: INTERNAL MEDICINE

## 2022-01-01 PROCEDURE — 84145 PROCALCITONIN (PCT): CPT

## 2022-01-01 PROCEDURE — 92978 ENDOLUMINL IVUS OCT C 1ST: CPT | Mod: 26,LC | Performed by: INTERNAL MEDICINE

## 2022-01-01 PROCEDURE — 85027 COMPLETE CBC AUTOMATED: CPT

## 2022-01-01 PROCEDURE — 160035 HCHG PACU - 1ST 60 MINS PHASE I: Performed by: INTERNAL MEDICINE

## 2022-01-01 PROCEDURE — 700101 HCHG RX REV CODE 250: Performed by: ANESTHESIOLOGY

## 2022-01-01 PROCEDURE — 99238 HOSP IP/OBS DSCHRG MGMT 30/<: CPT | Performed by: HOSPITALIST

## 2022-01-01 PROCEDURE — 82945 GLUCOSE OTHER FLUID: CPT

## 2022-01-01 PROCEDURE — 87070 CULTURE OTHR SPECIMN AEROBIC: CPT

## 2022-01-01 PROCEDURE — 99223 1ST HOSP IP/OBS HIGH 75: CPT | Performed by: HOSPITALIST

## 2022-01-01 PROCEDURE — 700101 HCHG RX REV CODE 250

## 2022-01-01 PROCEDURE — 93970 EXTREMITY STUDY: CPT

## 2022-01-01 PROCEDURE — 700105 HCHG RX REV CODE 258: Performed by: ANESTHESIOLOGY

## 2022-01-01 PROCEDURE — 93970 EXTREMITY STUDY: CPT | Mod: 26 | Performed by: INTERNAL MEDICINE

## 2022-01-01 PROCEDURE — 160048 HCHG OR STATISTICAL LEVEL 1-5: Performed by: INTERNAL MEDICINE

## 2022-01-01 PROCEDURE — 99222 1ST HOSP IP/OBS MODERATE 55: CPT | Performed by: INTERNAL MEDICINE

## 2022-01-01 PROCEDURE — 83735 ASSAY OF MAGNESIUM: CPT

## 2022-01-01 PROCEDURE — RXMED WILLOW AMBULATORY MEDICATION CHARGE: Performed by: PHYSICIAN ASSISTANT

## 2022-01-01 PROCEDURE — 93005 ELECTROCARDIOGRAM TRACING: CPT | Performed by: STUDENT IN AN ORGANIZED HEALTH CARE EDUCATION/TRAINING PROGRAM

## 2022-01-01 PROCEDURE — 99157 MOD SED OTHER PHYS/QHP EA: CPT | Performed by: INTERNAL MEDICINE

## 2022-01-01 PROCEDURE — 81001 URINALYSIS AUTO W/SCOPE: CPT

## 2022-01-01 PROCEDURE — 99233 SBSQ HOSP IP/OBS HIGH 50: CPT | Mod: GC | Performed by: INTERNAL MEDICINE

## 2022-01-01 PROCEDURE — 83880 ASSAY OF NATRIURETIC PEPTIDE: CPT | Mod: GA

## 2022-01-01 PROCEDURE — 700111 HCHG RX REV CODE 636 W/ 250 OVERRIDE (IP): Performed by: EMERGENCY MEDICINE

## 2022-01-01 PROCEDURE — 5A0221D ASSISTANCE WITH CARDIAC OUTPUT USING IMPELLER PUMP, CONTINUOUS: ICD-10-PCS | Performed by: INTERNAL MEDICINE

## 2022-01-01 PROCEDURE — 93321 DOPPLER ECHO F-UP/LMTD STD: CPT | Mod: 26 | Performed by: INTERNAL MEDICINE

## 2022-01-01 PROCEDURE — 99215 OFFICE O/P EST HI 40 MIN: CPT | Performed by: INTERNAL MEDICINE

## 2022-01-01 PROCEDURE — 99156 MOD SED OTH PHYS/QHP 5/>YRS: CPT | Performed by: INTERNAL MEDICINE

## 2022-01-01 PROCEDURE — 99214 OFFICE O/P EST MOD 30 MIN: CPT | Performed by: NURSE PRACTITIONER

## 2022-01-01 PROCEDURE — 88312 SPECIAL STAINS GROUP 1: CPT

## 2022-01-01 PROCEDURE — 85379 FIBRIN DEGRADATION QUANT: CPT

## 2022-01-01 PROCEDURE — 92960 CARDIOVERSION ELECTRIC EXT: CPT | Performed by: INTERNAL MEDICINE

## 2022-01-01 PROCEDURE — 71275 CT ANGIOGRAPHY CHEST: CPT | Mod: ME

## 2022-01-01 PROCEDURE — 96374 THER/PROPH/DIAG INJ IV PUSH: CPT

## 2022-01-01 PROCEDURE — 5A2204Z RESTORATION OF CARDIAC RHYTHM, SINGLE: ICD-10-PCS | Performed by: INTERNAL MEDICINE

## 2022-01-01 PROCEDURE — 700101 HCHG RX REV CODE 250: Performed by: INTERNAL MEDICINE

## 2022-01-01 PROCEDURE — C9803 HOPD COVID-19 SPEC COLLECT: HCPCS | Performed by: EMERGENCY MEDICINE

## 2022-01-01 PROCEDURE — 85576 BLOOD PLATELET AGGREGATION: CPT

## 2022-01-01 PROCEDURE — 85046 RETICYTE/HGB CONCENTRATE: CPT

## 2022-01-01 PROCEDURE — 0241U HCHG SARS-COV-2 COVID-19 NFCT DS RESP RNA 4 TRGT MIC: CPT

## 2022-01-01 PROCEDURE — 027237Z DILATION OF CORONARY ARTERY, THREE ARTERIES WITH FOUR OR MORE DRUG-ELUTING INTRALUMINAL DEVICES, PERCUTANEOUS APPROACH: ICD-10-PCS | Performed by: INTERNAL MEDICINE

## 2022-01-01 PROCEDURE — 97535 SELF CARE MNGMENT TRAINING: CPT

## 2022-01-01 PROCEDURE — 85014 HEMATOCRIT: CPT

## 2022-01-01 PROCEDURE — 93306 TTE W/DOPPLER COMPLETE: CPT

## 2022-01-01 PROCEDURE — 0W9B3ZZ DRAINAGE OF LEFT PLEURAL CAVITY, PERCUTANEOUS APPROACH: ICD-10-PCS | Performed by: RADIOLOGY

## 2022-01-01 PROCEDURE — 0DBL8ZZ EXCISION OF TRANSVERSE COLON, VIA NATURAL OR ARTIFICIAL OPENING ENDOSCOPIC: ICD-10-PCS | Performed by: INTERNAL MEDICINE

## 2022-01-01 PROCEDURE — 93312 ECHO TRANSESOPHAGEAL: CPT

## 2022-01-01 PROCEDURE — B24BZZ4 ULTRASONOGRAPHY OF HEART WITH AORTA, TRANSESOPHAGEAL: ICD-10-PCS | Performed by: INTERNAL MEDICINE

## 2022-01-01 PROCEDURE — 74174 CTA ABD&PLVS W/CONTRAST: CPT | Mod: ME

## 2022-01-01 PROCEDURE — 93308 TTE F-UP OR LMTD: CPT | Mod: 26 | Performed by: INTERNAL MEDICINE

## 2022-01-01 PROCEDURE — 74176 CT ABD & PELVIS W/O CONTRAST: CPT | Mod: MG

## 2022-01-01 PROCEDURE — 93005 ELECTROCARDIOGRAM TRACING: CPT | Performed by: EMERGENCY MEDICINE

## 2022-01-01 PROCEDURE — 84484 ASSAY OF TROPONIN QUANT: CPT

## 2022-01-01 PROCEDURE — 99152 MOD SED SAME PHYS/QHP 5/>YRS: CPT | Performed by: INTERNAL MEDICINE

## 2022-01-01 PROCEDURE — 700101 HCHG RX REV CODE 250: Performed by: STUDENT IN AN ORGANIZED HEALTH CARE EDUCATION/TRAINING PROGRAM

## 2022-01-01 PROCEDURE — 86900 BLOOD TYPING SEROLOGIC ABO: CPT

## 2022-01-01 PROCEDURE — 99233 SBSQ HOSP IP/OBS HIGH 50: CPT | Performed by: HOSPITALIST

## 2022-01-01 PROCEDURE — 700105 HCHG RX REV CODE 258: Performed by: HOSPITALIST

## 2022-01-01 PROCEDURE — 84295 ASSAY OF SERUM SODIUM: CPT

## 2022-01-01 PROCEDURE — 84132 ASSAY OF SERUM POTASSIUM: CPT

## 2022-01-01 PROCEDURE — 0DBN8ZZ EXCISION OF SIGMOID COLON, VIA NATURAL OR ARTIFICIAL OPENING ENDOSCOPIC: ICD-10-PCS | Performed by: INTERNAL MEDICINE

## 2022-01-01 PROCEDURE — B2111ZZ FLUOROSCOPY OF MULTIPLE CORONARY ARTERIES USING LOW OSMOLAR CONTRAST: ICD-10-PCS | Performed by: INTERNAL MEDICINE

## 2022-01-01 PROCEDURE — 501629 HCHG TUBE, LUKI TRAP STERILE DISP: Performed by: INTERNAL MEDICINE

## 2022-01-01 PROCEDURE — 99239 HOSP IP/OBS DSCHRG MGMT >30: CPT | Mod: GC | Performed by: INTERNAL MEDICINE

## 2022-01-01 PROCEDURE — 88305 TISSUE EXAM BY PATHOLOGIST: CPT | Mod: 59

## 2022-01-01 PROCEDURE — 4A023N7 MEASUREMENT OF CARDIAC SAMPLING AND PRESSURE, LEFT HEART, PERCUTANEOUS APPROACH: ICD-10-PCS | Performed by: INTERNAL MEDICINE

## 2022-01-01 RX ORDER — FLUTICASONE PROPIONATE 44 UG/1
2 AEROSOL, METERED RESPIRATORY (INHALATION) DAILY
Status: DISCONTINUED | OUTPATIENT
Start: 2022-01-01 | End: 2022-01-01

## 2022-01-01 RX ORDER — DILTIAZEM HYDROCHLORIDE 5 MG/ML
10 INJECTION INTRAVENOUS ONCE
Status: COMPLETED | OUTPATIENT
Start: 2022-01-01 | End: 2022-01-01

## 2022-01-01 RX ORDER — BUDESONIDE AND FORMOTEROL FUMARATE DIHYDRATE 160; 4.5 UG/1; UG/1
2 AEROSOL RESPIRATORY (INHALATION)
Status: DISCONTINUED | OUTPATIENT
Start: 2022-01-01 | End: 2022-01-01 | Stop reason: HOSPADM

## 2022-01-01 RX ORDER — FUROSEMIDE 40 MG/1
40 TABLET ORAL EVERY MORNING
Qty: 30 TABLET | Refills: 11 | Status: SHIPPED | OUTPATIENT
Start: 2022-01-01

## 2022-01-01 RX ORDER — FLUTICASONE PROPIONATE 44 UG/1
2 AEROSOL, METERED RESPIRATORY (INHALATION)
Status: DISCONTINUED | OUTPATIENT
Start: 2022-01-01 | End: 2022-01-01 | Stop reason: HOSPADM

## 2022-01-01 RX ORDER — DIGOXIN 0.25 MG/ML
250 INJECTION INTRAMUSCULAR; INTRAVENOUS ONCE
Status: DISCONTINUED | OUTPATIENT
Start: 2022-01-01 | End: 2022-01-01

## 2022-01-01 RX ORDER — NALOXONE HYDROCHLORIDE 0.4 MG/ML
INJECTION, SOLUTION INTRAMUSCULAR; INTRAVENOUS; SUBCUTANEOUS
Status: COMPLETED
Start: 2022-01-01 | End: 2022-01-01

## 2022-01-01 RX ORDER — OXYCODONE HCL 5 MG/5 ML
10 SOLUTION, ORAL ORAL
Status: DISCONTINUED | OUTPATIENT
Start: 2022-01-01 | End: 2022-01-01 | Stop reason: HOSPADM

## 2022-01-01 RX ORDER — MIDAZOLAM HYDROCHLORIDE 1 MG/ML
5 INJECTION INTRAMUSCULAR; INTRAVENOUS ONCE
Status: COMPLETED | OUTPATIENT
Start: 2022-01-01 | End: 2022-01-01

## 2022-01-01 RX ORDER — DIGOXIN 0.25 MG/ML
250 INJECTION INTRAMUSCULAR; INTRAVENOUS ONCE
Status: COMPLETED | OUTPATIENT
Start: 2022-01-01 | End: 2022-01-01

## 2022-01-01 RX ORDER — SODIUM CHLORIDE 9 MG/ML
1000 INJECTION, SOLUTION INTRAVENOUS
Status: COMPLETED | OUTPATIENT
Start: 2022-01-01 | End: 2022-01-01

## 2022-01-01 RX ORDER — MORPHINE SULFATE 10 MG/ML
5 INJECTION, SOLUTION INTRAMUSCULAR; INTRAVENOUS
Status: DISCONTINUED | OUTPATIENT
Start: 2022-01-01 | End: 2022-01-01 | Stop reason: HOSPADM

## 2022-01-01 RX ORDER — METOPROLOL SUCCINATE 25 MG/1
12.5 TABLET, EXTENDED RELEASE ORAL DAILY
COMMUNITY
Start: 2022-01-01 | End: 2022-01-01 | Stop reason: SDUPTHER

## 2022-01-01 RX ORDER — ATORVASTATIN CALCIUM 40 MG/1
40 TABLET, FILM COATED ORAL EVERY EVENING
Status: DISCONTINUED | OUTPATIENT
Start: 2022-01-01 | End: 2022-01-01 | Stop reason: HOSPADM

## 2022-01-01 RX ORDER — MIDAZOLAM HYDROCHLORIDE 1 MG/ML
INJECTION INTRAMUSCULAR; INTRAVENOUS
Status: COMPLETED
Start: 2022-01-01 | End: 2022-01-01

## 2022-01-01 RX ORDER — DIGOXIN 125 MCG
0.06 TABLET ORAL EVERY MORNING
Status: DISCONTINUED | OUTPATIENT
Start: 2022-01-01 | End: 2022-01-01

## 2022-01-01 RX ORDER — DIGOXIN 0.25 MG/ML
500 INJECTION INTRAMUSCULAR; INTRAVENOUS ONCE
Status: COMPLETED | OUTPATIENT
Start: 2022-01-01 | End: 2022-01-01

## 2022-01-01 RX ORDER — ACETAMINOPHEN 325 MG/1
650 TABLET ORAL EVERY 6 HOURS PRN
Status: DISCONTINUED | OUTPATIENT
Start: 2022-01-01 | End: 2022-01-01 | Stop reason: HOSPADM

## 2022-01-01 RX ORDER — HEPARIN SODIUM 1000 [USP'U]/ML
INJECTION, SOLUTION INTRAVENOUS; SUBCUTANEOUS
Status: COMPLETED
Start: 2022-01-01 | End: 2022-01-01

## 2022-01-01 RX ORDER — ATROPINE SULFATE 10 MG/ML
2 SOLUTION/ DROPS OPHTHALMIC EVERY 4 HOURS PRN
Status: DISCONTINUED | OUTPATIENT
Start: 2022-01-01 | End: 2022-01-01 | Stop reason: HOSPADM

## 2022-01-01 RX ORDER — BISACODYL 10 MG
10 SUPPOSITORY, RECTAL RECTAL
Status: DISCONTINUED | OUTPATIENT
Start: 2022-01-01 | End: 2022-01-01 | Stop reason: HOSPADM

## 2022-01-01 RX ORDER — HALOPERIDOL 5 MG/ML
1 INJECTION INTRAMUSCULAR
Status: DISCONTINUED | OUTPATIENT
Start: 2022-01-01 | End: 2022-01-01 | Stop reason: HOSPADM

## 2022-01-01 RX ORDER — MEPERIDINE HYDROCHLORIDE 25 MG/ML
12.5 INJECTION INTRAMUSCULAR; INTRAVENOUS; SUBCUTANEOUS
Status: DISCONTINUED | OUTPATIENT
Start: 2022-01-01 | End: 2022-01-01 | Stop reason: HOSPADM

## 2022-01-01 RX ORDER — PANTOPRAZOLE SODIUM 40 MG/10ML
40 INJECTION, POWDER, LYOPHILIZED, FOR SOLUTION INTRAVENOUS 2 TIMES DAILY
Status: DISCONTINUED | OUTPATIENT
Start: 2022-01-01 | End: 2022-01-01

## 2022-01-01 RX ORDER — FUROSEMIDE 10 MG/ML
80 INJECTION INTRAMUSCULAR; INTRAVENOUS ONCE
Status: COMPLETED | OUTPATIENT
Start: 2022-01-01 | End: 2022-01-01

## 2022-01-01 RX ORDER — OXYCODONE HYDROCHLORIDE AND ACETAMINOPHEN 5; 325 MG/1; MG/1
0.5 TABLET ORAL
Status: ON HOLD | COMMUNITY
End: 2022-01-01

## 2022-01-01 RX ORDER — FUROSEMIDE 10 MG/ML
40 INJECTION INTRAMUSCULAR; INTRAVENOUS
Status: DISCONTINUED | OUTPATIENT
Start: 2022-01-01 | End: 2022-01-01

## 2022-01-01 RX ORDER — BENZONATATE 100 MG/1
200 CAPSULE ORAL 3 TIMES DAILY PRN
Status: DISCONTINUED | OUTPATIENT
Start: 2022-01-01 | End: 2022-01-01 | Stop reason: HOSPADM

## 2022-01-01 RX ORDER — OXYCODONE HCL 5 MG/5 ML
5 SOLUTION, ORAL ORAL
Status: DISCONTINUED | OUTPATIENT
Start: 2022-01-01 | End: 2022-01-01 | Stop reason: HOSPADM

## 2022-01-01 RX ORDER — ATORVASTATIN CALCIUM 40 MG/1
40 TABLET, FILM COATED ORAL DAILY
Qty: 30 TABLET | Refills: 11 | Status: SHIPPED | OUTPATIENT
Start: 2022-01-01

## 2022-01-01 RX ORDER — OMEPRAZOLE 20 MG/1
40 CAPSULE, DELAYED RELEASE ORAL DAILY
Refills: 1 | Status: DISCONTINUED | OUTPATIENT
Start: 2022-01-01 | End: 2022-01-01

## 2022-01-01 RX ORDER — MIDAZOLAM HYDROCHLORIDE 1 MG/ML
1 INJECTION INTRAMUSCULAR; INTRAVENOUS
Status: DISCONTINUED | OUTPATIENT
Start: 2022-01-01 | End: 2022-01-01 | Stop reason: HOSPADM

## 2022-01-01 RX ORDER — ONDANSETRON 4 MG/1
4 TABLET, ORALLY DISINTEGRATING ORAL EVERY 4 HOURS PRN
Status: DISCONTINUED | OUTPATIENT
Start: 2022-01-01 | End: 2022-01-01 | Stop reason: HOSPADM

## 2022-01-01 RX ORDER — LIDOCAINE 50 MG/G
1 PATCH TOPICAL EVERY 24 HOURS
Status: DISCONTINUED | OUTPATIENT
Start: 2022-01-01 | End: 2022-01-01 | Stop reason: HOSPADM

## 2022-01-01 RX ORDER — FUROSEMIDE 20 MG/1
20 TABLET ORAL
Qty: 30 TABLET | Refills: 0 | Status: SHIPPED
Start: 2022-01-01 | End: 2022-01-01

## 2022-01-01 RX ORDER — FUROSEMIDE 40 MG/1
40 TABLET ORAL DAILY
Status: DISCONTINUED | OUTPATIENT
Start: 2022-01-01 | End: 2022-01-01

## 2022-01-01 RX ORDER — FUROSEMIDE 20 MG/1
20 TABLET ORAL
Status: DISCONTINUED | OUTPATIENT
Start: 2022-01-01 | End: 2022-01-01

## 2022-01-01 RX ORDER — FUROSEMIDE 10 MG/ML
80 INJECTION INTRAMUSCULAR; INTRAVENOUS ONCE
Status: DISCONTINUED | OUTPATIENT
Start: 2022-01-01 | End: 2022-01-01

## 2022-01-01 RX ORDER — LIDOCAINE HYDROCHLORIDE 20 MG/ML
INJECTION, SOLUTION EPIDURAL; INFILTRATION; INTRACAUDAL; PERINEURAL PRN
Status: DISCONTINUED | OUTPATIENT
Start: 2022-01-01 | End: 2022-01-01 | Stop reason: SURG

## 2022-01-01 RX ORDER — ONDANSETRON 2 MG/ML
4 INJECTION INTRAMUSCULAR; INTRAVENOUS
Status: DISCONTINUED | OUTPATIENT
Start: 2022-01-01 | End: 2022-01-01 | Stop reason: HOSPADM

## 2022-01-01 RX ORDER — POTASSIUM CHLORIDE 750 MG/1
10 TABLET, FILM COATED, EXTENDED RELEASE ORAL
COMMUNITY
Start: 2022-01-01 | End: 2022-01-01

## 2022-01-01 RX ORDER — IPRATROPIUM BROMIDE AND ALBUTEROL SULFATE 2.5; .5 MG/3ML; MG/3ML
3 SOLUTION RESPIRATORY (INHALATION)
Status: DISCONTINUED | OUTPATIENT
Start: 2022-01-01 | End: 2022-01-01

## 2022-01-01 RX ORDER — METOPROLOL SUCCINATE 25 MG/1
25 TABLET, EXTENDED RELEASE ORAL EVERY EVENING
Status: DISCONTINUED | OUTPATIENT
Start: 2022-01-01 | End: 2022-01-01

## 2022-01-01 RX ORDER — CLOPIDOGREL BISULFATE 75 MG/1
75 TABLET ORAL DAILY
Qty: 30 TABLET | Refills: 11 | Status: SHIPPED | OUTPATIENT
Start: 2022-01-01

## 2022-01-01 RX ORDER — NALOXONE HYDROCHLORIDE 0.4 MG/ML
0.4 INJECTION, SOLUTION INTRAMUSCULAR; INTRAVENOUS; SUBCUTANEOUS ONCE
Status: DISCONTINUED | OUTPATIENT
Start: 2022-01-01 | End: 2022-01-01 | Stop reason: HOSPADM

## 2022-01-01 RX ORDER — VITS A,C,E/LUTEIN/MINERALS 300MCG-200
1 TABLET ORAL DAILY
Status: DISCONTINUED | OUTPATIENT
Start: 2022-01-01 | End: 2022-01-01

## 2022-01-01 RX ORDER — OXYCODONE HYDROCHLORIDE AND ACETAMINOPHEN 5; 325 MG/1; MG/1
0.5 TABLET ORAL
Status: DISCONTINUED | OUTPATIENT
Start: 2022-01-01 | End: 2022-01-01

## 2022-01-01 RX ORDER — ONDANSETRON 2 MG/ML
4 INJECTION INTRAMUSCULAR; INTRAVENOUS EVERY 4 HOURS PRN
Status: DISCONTINUED | OUTPATIENT
Start: 2022-01-01 | End: 2022-01-01 | Stop reason: HOSPADM

## 2022-01-01 RX ORDER — POLYETHYLENE GLYCOL 3350 17 G/17G
1 POWDER, FOR SOLUTION ORAL
Status: DISCONTINUED | OUTPATIENT
Start: 2022-01-01 | End: 2022-01-01 | Stop reason: HOSPADM

## 2022-01-01 RX ORDER — SODIUM CHLORIDE, SODIUM LACTATE, POTASSIUM CHLORIDE, CALCIUM CHLORIDE 600; 310; 30; 20 MG/100ML; MG/100ML; MG/100ML; MG/100ML
INJECTION, SOLUTION INTRAVENOUS CONTINUOUS
Status: DISCONTINUED | OUTPATIENT
Start: 2022-01-01 | End: 2022-01-01 | Stop reason: HOSPADM

## 2022-01-01 RX ORDER — CLOPIDOGREL BISULFATE 75 MG/1
75 TABLET ORAL EVERY EVENING
Status: DISCONTINUED | OUTPATIENT
Start: 2022-01-01 | End: 2022-01-01 | Stop reason: HOSPADM

## 2022-01-01 RX ORDER — SODIUM CHLORIDE 9 MG/ML
INJECTION, SOLUTION INTRAVENOUS CONTINUOUS
Status: DISCONTINUED | OUTPATIENT
Start: 2022-01-01 | End: 2022-01-01

## 2022-01-01 RX ORDER — GUAIFENESIN 600 MG/1
600 TABLET, EXTENDED RELEASE ORAL 2 TIMES DAILY
Status: ON HOLD | COMMUNITY
End: 2022-01-01

## 2022-01-01 RX ORDER — VITS A,C,E/LUTEIN/MINERALS 300MCG-200
1 TABLET ORAL DAILY
Status: ON HOLD | COMMUNITY
End: 2022-01-01

## 2022-01-01 RX ORDER — POLYVINYL ALCOHOL 14 MG/ML
2 SOLUTION/ DROPS OPHTHALMIC EVERY 6 HOURS PRN
Status: DISCONTINUED | OUTPATIENT
Start: 2022-01-01 | End: 2022-01-01 | Stop reason: HOSPADM

## 2022-01-01 RX ORDER — PHENYLEPHRINE HCL IN 0.9% NACL 0.5 MG/5ML
SYRINGE (ML) INTRAVENOUS
Status: COMPLETED
Start: 2022-01-01 | End: 2022-01-01

## 2022-01-01 RX ORDER — ASPIRIN 325 MG
325 TABLET ORAL ONCE
Status: COMPLETED | OUTPATIENT
Start: 2022-01-01 | End: 2022-01-01

## 2022-01-01 RX ORDER — METOPROLOL SUCCINATE 25 MG/1
25 TABLET, EXTENDED RELEASE ORAL DAILY
Qty: 90 TABLET | Refills: 3 | Status: ON HOLD
Start: 2022-01-01 | End: 2022-01-01

## 2022-01-01 RX ORDER — DOXYCYCLINE 100 MG/1
100 TABLET ORAL EVERY 12 HOURS
Status: DISCONTINUED | OUTPATIENT
Start: 2022-01-01 | End: 2022-01-01

## 2022-01-01 RX ORDER — LIDOCAINE HYDROCHLORIDE 20 MG/ML
5 SOLUTION OROPHARYNGEAL EVERY 4 HOURS PRN
Status: DISCONTINUED | OUTPATIENT
Start: 2022-01-01 | End: 2022-01-01 | Stop reason: HOSPADM

## 2022-01-01 RX ORDER — CLOPIDOGREL 300 MG/1
600 TABLET, FILM COATED ORAL ONCE
Status: DISCONTINUED | OUTPATIENT
Start: 2022-01-01 | End: 2022-01-01

## 2022-01-01 RX ORDER — VERAPAMIL HYDROCHLORIDE 2.5 MG/ML
INJECTION, SOLUTION INTRAVENOUS
Status: COMPLETED
Start: 2022-01-01 | End: 2022-01-01

## 2022-01-01 RX ORDER — BENZONATATE 200 MG/1
200 CAPSULE ORAL 3 TIMES DAILY
Status: ON HOLD | COMMUNITY
End: 2022-01-01

## 2022-01-01 RX ORDER — GABAPENTIN 100 MG/1
100 CAPSULE ORAL DAILY
Status: DISCONTINUED | OUTPATIENT
Start: 2022-01-01 | End: 2022-01-01

## 2022-01-01 RX ORDER — FUROSEMIDE 40 MG/1
40 TABLET ORAL DAILY
Qty: 90 TABLET | Refills: 3 | Status: ON HOLD | OUTPATIENT
Start: 2022-01-01 | End: 2022-01-01 | Stop reason: SDUPTHER

## 2022-01-01 RX ORDER — CLOPIDOGREL BISULFATE 75 MG/1
75 TABLET ORAL DAILY
Status: DISCONTINUED | OUTPATIENT
Start: 2022-01-01 | End: 2022-01-01

## 2022-01-01 RX ORDER — HEPARIN SODIUM 200 [USP'U]/100ML
INJECTION, SOLUTION INTRAVENOUS
Status: COMPLETED
Start: 2022-01-01 | End: 2022-01-01

## 2022-01-01 RX ORDER — OMEPRAZOLE 20 MG/1
40 CAPSULE, DELAYED RELEASE ORAL DAILY
Status: DISCONTINUED | OUTPATIENT
Start: 2022-01-01 | End: 2022-01-01 | Stop reason: HOSPADM

## 2022-01-01 RX ORDER — SCOLOPAMINE TRANSDERMAL SYSTEM 1 MG/1
1 PATCH, EXTENDED RELEASE TRANSDERMAL
Status: DISCONTINUED | OUTPATIENT
Start: 2022-01-01 | End: 2022-01-01 | Stop reason: HOSPADM

## 2022-01-01 RX ORDER — CLOPIDOGREL 300 MG/1
600 TABLET, FILM COATED ORAL ONCE
Status: COMPLETED | OUTPATIENT
Start: 2022-01-01 | End: 2022-01-01

## 2022-01-01 RX ORDER — PROTAMINE SULFATE 10 MG/ML
INJECTION, SOLUTION INTRAVENOUS
Status: COMPLETED
Start: 2022-01-01 | End: 2022-01-01

## 2022-01-01 RX ORDER — LORAZEPAM 2 MG/ML
1 INJECTION INTRAMUSCULAR
Status: DISCONTINUED | OUTPATIENT
Start: 2022-01-01 | End: 2022-01-01 | Stop reason: HOSPADM

## 2022-01-01 RX ORDER — FUROSEMIDE 10 MG/ML
80 INJECTION INTRAMUSCULAR; INTRAVENOUS
Status: DISCONTINUED | OUTPATIENT
Start: 2022-01-01 | End: 2022-01-01

## 2022-01-01 RX ORDER — AMIODARONE HYDROCHLORIDE 200 MG/1
400 TABLET ORAL TWICE DAILY
Status: DISCONTINUED | OUTPATIENT
Start: 2022-01-01 | End: 2022-01-01

## 2022-01-01 RX ORDER — IPRATROPIUM BROMIDE AND ALBUTEROL SULFATE 2.5; .5 MG/3ML; MG/3ML
3 SOLUTION RESPIRATORY (INHALATION)
Status: DISCONTINUED | OUTPATIENT
Start: 2022-01-01 | End: 2022-01-01 | Stop reason: HOSPADM

## 2022-01-01 RX ORDER — CLOPIDOGREL BISULFATE 75 MG/1
75 TABLET ORAL DAILY
Status: DISCONTINUED | OUTPATIENT
Start: 2022-01-01 | End: 2022-01-01 | Stop reason: HOSPADM

## 2022-01-01 RX ORDER — LORAZEPAM 2 MG/ML
1 CONCENTRATE ORAL
Status: DISCONTINUED | OUTPATIENT
Start: 2022-01-01 | End: 2022-01-01 | Stop reason: HOSPADM

## 2022-01-01 RX ORDER — POTASSIUM CHLORIDE 20 MEQ/1
20 TABLET, EXTENDED RELEASE ORAL 2 TIMES DAILY
Status: DISCONTINUED | OUTPATIENT
Start: 2022-01-01 | End: 2022-01-01

## 2022-01-01 RX ORDER — HYDRALAZINE HYDROCHLORIDE 20 MG/ML
10 INJECTION INTRAMUSCULAR; INTRAVENOUS EVERY 4 HOURS PRN
Status: DISCONTINUED | OUTPATIENT
Start: 2022-01-01 | End: 2022-01-01 | Stop reason: HOSPADM

## 2022-01-01 RX ORDER — AMOXICILLIN 250 MG
2 CAPSULE ORAL 2 TIMES DAILY
Status: DISCONTINUED | OUTPATIENT
Start: 2022-01-01 | End: 2022-01-01 | Stop reason: HOSPADM

## 2022-01-01 RX ORDER — FUROSEMIDE 40 MG/1
40 TABLET ORAL DAILY
Status: ON HOLD | COMMUNITY
Start: 2022-01-01 | End: 2022-01-01 | Stop reason: SDUPTHER

## 2022-01-01 RX ORDER — OMEPRAZOLE 20 MG/1
40 CAPSULE, DELAYED RELEASE ORAL 2 TIMES DAILY
Status: DISCONTINUED | OUTPATIENT
Start: 2022-01-01 | End: 2022-01-01

## 2022-01-01 RX ORDER — OMEPRAZOLE 40 MG/1
40 CAPSULE, DELAYED RELEASE ORAL DAILY
Qty: 30 CAPSULE | Refills: 0 | Status: SHIPPED | OUTPATIENT
Start: 2022-01-01 | End: 2022-01-01 | Stop reason: SDUPTHER

## 2022-01-01 RX ORDER — FERROUS SULFATE 325(65) MG
325 TABLET ORAL
Status: DISCONTINUED | OUTPATIENT
Start: 2022-01-01 | End: 2022-01-01

## 2022-01-01 RX ORDER — FUROSEMIDE 40 MG/1
20 TABLET ORAL DAILY
Qty: 30 TABLET | Refills: 2 | Status: SHIPPED | OUTPATIENT
Start: 2022-01-01 | End: 2022-01-01

## 2022-01-01 RX ORDER — NALOXONE HYDROCHLORIDE 1 MG/ML
1 INJECTION INTRAMUSCULAR; INTRAVENOUS; SUBCUTANEOUS ONCE
Status: COMPLETED | OUTPATIENT
Start: 2022-01-01 | End: 2022-01-01

## 2022-01-01 RX ORDER — DIPHENHYDRAMINE HYDROCHLORIDE 50 MG/ML
12.5 INJECTION INTRAMUSCULAR; INTRAVENOUS
Status: DISCONTINUED | OUTPATIENT
Start: 2022-01-01 | End: 2022-01-01 | Stop reason: HOSPADM

## 2022-01-01 RX ORDER — ATORVASTATIN CALCIUM 20 MG/1
20 TABLET, FILM COATED ORAL
Status: ON HOLD | COMMUNITY
Start: 2022-01-01 | End: 2022-01-01 | Stop reason: SDUPTHER

## 2022-01-01 RX ORDER — ATORVASTATIN CALCIUM 40 MG/1
40 TABLET, FILM COATED ORAL DAILY
Status: DISCONTINUED | OUTPATIENT
Start: 2022-01-01 | End: 2022-01-01

## 2022-01-01 RX ORDER — SODIUM CHLORIDE, SODIUM LACTATE, POTASSIUM CHLORIDE, AND CALCIUM CHLORIDE .6; .31; .03; .02 G/100ML; G/100ML; G/100ML; G/100ML
250 INJECTION, SOLUTION INTRAVENOUS ONCE
Status: COMPLETED | OUTPATIENT
Start: 2022-01-01 | End: 2022-01-01

## 2022-01-01 RX ORDER — BENZONATATE 100 MG/1
100 CAPSULE ORAL
COMMUNITY
End: 2022-01-01 | Stop reason: DRUGHIGH

## 2022-01-01 RX ORDER — TRAZODONE HYDROCHLORIDE 50 MG/1
50 TABLET ORAL
Status: DISCONTINUED | OUTPATIENT
Start: 2022-01-01 | End: 2022-01-01 | Stop reason: HOSPADM

## 2022-01-01 RX ORDER — OXYCODONE HYDROCHLORIDE AND ACETAMINOPHEN 5; 325 MG/1; MG/1
1 TABLET ORAL EVERY 6 HOURS PRN
Status: DISCONTINUED | OUTPATIENT
Start: 2022-01-01 | End: 2022-01-01 | Stop reason: HOSPADM

## 2022-01-01 RX ORDER — LIDOCAINE HYDROCHLORIDE 20 MG/ML
INJECTION, SOLUTION INFILTRATION; PERINEURAL
Status: COMPLETED
Start: 2022-01-01 | End: 2022-01-01

## 2022-01-01 RX ORDER — MAGNESIUM SULFATE HEPTAHYDRATE 40 MG/ML
4 INJECTION, SOLUTION INTRAVENOUS ONCE
Status: COMPLETED | OUTPATIENT
Start: 2022-01-01 | End: 2022-01-01

## 2022-01-01 RX ORDER — MORPHINE SULFATE 10 MG/ML
10 INJECTION, SOLUTION INTRAMUSCULAR; INTRAVENOUS
Status: DISCONTINUED | OUTPATIENT
Start: 2022-01-01 | End: 2022-01-01 | Stop reason: HOSPADM

## 2022-01-01 RX ORDER — FUROSEMIDE 40 MG/1
TABLET ORAL
Status: COMPLETED
Start: 2022-01-01 | End: 2022-01-01

## 2022-01-01 RX ORDER — SODIUM CHLORIDE, SODIUM LACTATE, POTASSIUM CHLORIDE, CALCIUM CHLORIDE 600; 310; 30; 20 MG/100ML; MG/100ML; MG/100ML; MG/100ML
INJECTION, SOLUTION INTRAVENOUS
Status: DISCONTINUED | OUTPATIENT
Start: 2022-01-01 | End: 2022-01-01 | Stop reason: SURG

## 2022-01-01 RX ADMIN — FLUTICASONE PROPIONATE 88 MCG: 44 AEROSOL, METERED RESPIRATORY (INHALATION) at 06:30

## 2022-01-01 RX ADMIN — GABAPENTIN 100 MG: 100 CAPSULE ORAL at 05:56

## 2022-01-01 RX ADMIN — DOXYCYCLINE 100 MG: 100 TABLET, FILM COATED ORAL at 06:16

## 2022-01-01 RX ADMIN — LORAZEPAM 1 MG: 2 INJECTION INTRAMUSCULAR; INTRAVENOUS at 14:34

## 2022-01-01 RX ADMIN — FENTANYL CITRATE 75 MCG: 50 INJECTION INTRAMUSCULAR; INTRAVENOUS at 15:42

## 2022-01-01 RX ADMIN — CLOPIDOGREL BISULFATE 75 MG: 75 TABLET ORAL at 05:48

## 2022-01-01 RX ADMIN — PROTAMINE SULFATE 50 MG: 10 INJECTION, SOLUTION INTRAVENOUS at 14:45

## 2022-01-01 RX ADMIN — DIGOXIN 500 MCG: 0.25 INJECTION INTRAMUSCULAR; INTRAVENOUS at 03:02

## 2022-01-01 RX ADMIN — ASPIRIN 81 MG: 81 TABLET, COATED ORAL at 17:14

## 2022-01-01 RX ADMIN — BUDESONIDE AND FORMOTEROL FUMARATE DIHYDRATE 2 PUFF: 160; 4.5 AEROSOL RESPIRATORY (INHALATION) at 20:27

## 2022-01-01 RX ADMIN — MIDAZOLAM HYDROCHLORIDE 2 MG: 1 INJECTION, SOLUTION INTRAMUSCULAR; INTRAVENOUS at 12:54

## 2022-01-01 RX ADMIN — OMEPRAZOLE 40 MG: 20 CAPSULE, DELAYED RELEASE ORAL at 05:55

## 2022-01-01 RX ADMIN — PROPOFOL 20 MG: 10 INJECTION, EMULSION INTRAVENOUS at 08:01

## 2022-01-01 RX ADMIN — MIDAZOLAM HYDROCHLORIDE 1 MG: 1 INJECTION, SOLUTION INTRAMUSCULAR; INTRAVENOUS at 14:18

## 2022-01-01 RX ADMIN — AMIODARONE HYDROCHLORIDE 400 MG: 200 TABLET ORAL at 09:31

## 2022-01-01 RX ADMIN — ATORVASTATIN CALCIUM 40 MG: 40 TABLET, FILM COATED ORAL at 05:48

## 2022-01-01 RX ADMIN — LIDOCAINE HYDROCHLORIDE 10 MG: 20 INJECTION, SOLUTION EPIDURAL; INFILTRATION; INTRACAUDAL at 07:54

## 2022-01-01 RX ADMIN — UMECLIDINIUM BROMIDE AND VILANTEROL TRIFENATATE 1 PUFF: 62.5; 25 POWDER RESPIRATORY (INHALATION) at 06:30

## 2022-01-01 RX ADMIN — SENNOSIDES AND DOCUSATE SODIUM 2 TABLET: 50; 8.6 TABLET ORAL at 16:43

## 2022-01-01 RX ADMIN — SENNOSIDES AND DOCUSATE SODIUM 2 TABLET: 50; 8.6 TABLET ORAL at 18:28

## 2022-01-01 RX ADMIN — ASPIRIN 81 MG: 81 TABLET, COATED ORAL at 06:16

## 2022-01-01 RX ADMIN — IOHEXOL 100 ML: 350 INJECTION, SOLUTION INTRAVENOUS at 09:09

## 2022-01-01 RX ADMIN — TIOTROPIUM BROMIDE INHALATION SPRAY 5 MCG: 3.12 SPRAY, METERED RESPIRATORY (INHALATION) at 06:53

## 2022-01-01 RX ADMIN — FUROSEMIDE 80 MG: 10 INJECTION, SOLUTION INTRAMUSCULAR; INTRAVENOUS at 12:23

## 2022-01-01 RX ADMIN — FLUTICASONE PROPIONATE 88 MCG: 44 AEROSOL, METERED RESPIRATORY (INHALATION) at 05:50

## 2022-01-01 RX ADMIN — FERROUS SULFATE TAB 325 MG (65 MG ELEMENTAL FE) 325 MG: 325 (65 FE) TAB at 09:31

## 2022-01-01 RX ADMIN — FENTANYL CITRATE 50 MCG: 50 INJECTION, SOLUTION INTRAMUSCULAR; INTRAVENOUS at 07:54

## 2022-01-01 RX ADMIN — FENTANYL CITRATE 100 MCG: 50 INJECTION INTRAMUSCULAR; INTRAVENOUS at 13:18

## 2022-01-01 RX ADMIN — HEPARIN SODIUM: 1000 INJECTION, SOLUTION INTRAVENOUS; SUBCUTANEOUS at 10:37

## 2022-01-01 RX ADMIN — GABAPENTIN 100 MG: 100 CAPSULE ORAL at 05:48

## 2022-01-01 RX ADMIN — SENNOSIDES AND DOCUSATE SODIUM 2 TABLET: 50; 8.6 TABLET ORAL at 17:40

## 2022-01-01 RX ADMIN — SENNOSIDES AND DOCUSATE SODIUM 2 TABLET: 50; 8.6 TABLET ORAL at 05:48

## 2022-01-01 RX ADMIN — PATIROMER 16.8 G: 16.8 POWDER, FOR SUSPENSION ORAL at 12:59

## 2022-01-01 RX ADMIN — CLOPIDOGREL BISULFATE 75 MG: 75 TABLET ORAL at 05:04

## 2022-01-01 RX ADMIN — IOHEXOL 80 ML: 350 INJECTION, SOLUTION INTRAVENOUS at 23:15

## 2022-01-01 RX ADMIN — OMEPRAZOLE 40 MG: 20 CAPSULE, DELAYED RELEASE ORAL at 06:27

## 2022-01-01 RX ADMIN — ATORVASTATIN CALCIUM 40 MG: 40 TABLET, FILM COATED ORAL at 18:04

## 2022-01-01 RX ADMIN — APIXABAN 2.5 MG: 5 TABLET, FILM COATED ORAL at 05:56

## 2022-01-01 RX ADMIN — CLOPIDOGREL BISULFATE 75 MG: 75 TABLET ORAL at 16:04

## 2022-01-01 RX ADMIN — FENTANYL CITRATE 50 MCG: 50 INJECTION, SOLUTION INTRAMUSCULAR; INTRAVENOUS at 14:17

## 2022-01-01 RX ADMIN — HEPARIN SODIUM: 1000 INJECTION, SOLUTION INTRAVENOUS; SUBCUTANEOUS at 10:09

## 2022-01-01 RX ADMIN — GABAPENTIN 100 MG: 100 CAPSULE ORAL at 06:16

## 2022-01-01 RX ADMIN — SODIUM CHLORIDE, POTASSIUM CHLORIDE, SODIUM LACTATE AND CALCIUM CHLORIDE 250 ML: 600; 310; 30; 20 INJECTION, SOLUTION INTRAVENOUS at 11:10

## 2022-01-01 RX ADMIN — OMEPRAZOLE 40 MG: 20 CAPSULE, DELAYED RELEASE ORAL at 04:25

## 2022-01-01 RX ADMIN — APIXABAN 2.5 MG: 5 TABLET, FILM COATED ORAL at 03:02

## 2022-01-01 RX ADMIN — POTASSIUM CHLORIDE 20 MEQ: 20 TABLET, EXTENDED RELEASE ORAL at 06:23

## 2022-01-01 RX ADMIN — ASPIRIN 325 MG: 325 TABLET ORAL at 12:18

## 2022-01-01 RX ADMIN — ASPIRIN 81 MG: 81 TABLET, COATED ORAL at 05:04

## 2022-01-01 RX ADMIN — POTASSIUM CHLORIDE 20 MEQ: 20 TABLET, EXTENDED RELEASE ORAL at 16:40

## 2022-01-01 RX ADMIN — SODIUM CHLORIDE: 9 INJECTION, SOLUTION INTRAVENOUS at 23:38

## 2022-01-01 RX ADMIN — APIXABAN 2.5 MG: 5 TABLET, FILM COATED ORAL at 17:40

## 2022-01-01 RX ADMIN — NALOXONE HYDROCHLORIDE 0.4 MG: 0.4 INJECTION, SOLUTION INTRAMUSCULAR; INTRAVENOUS; SUBCUTANEOUS at 13:31

## 2022-01-01 RX ADMIN — NALOXONE HYDROCHLORIDE 1 MG: 1 INJECTION PARENTERAL at 14:26

## 2022-01-01 RX ADMIN — TIOTROPIUM BROMIDE INHALATION SPRAY 5 MCG: 3.12 SPRAY, METERED RESPIRATORY (INHALATION) at 08:48

## 2022-01-01 RX ADMIN — MAGNESIUM SULFATE HEPTAHYDRATE 4 G: 40 INJECTION, SOLUTION INTRAVENOUS at 11:57

## 2022-01-01 RX ADMIN — BUDESONIDE AND FORMOTEROL FUMARATE DIHYDRATE 2 PUFF: 160; 4.5 AEROSOL RESPIRATORY (INHALATION) at 08:48

## 2022-01-01 RX ADMIN — MIDAZOLAM HYDROCHLORIDE 2 MG: 1 INJECTION, SOLUTION INTRAMUSCULAR; INTRAVENOUS at 11:40

## 2022-01-01 RX ADMIN — CLOPIDOGREL BISULFATE 75 MG: 75 TABLET ORAL at 17:39

## 2022-01-01 RX ADMIN — ASPIRIN 81 MG: 81 TABLET, COATED ORAL at 06:23

## 2022-01-01 RX ADMIN — VERAPAMIL HYDROCHLORIDE 2.5 MG: 2.5 INJECTION, SOLUTION INTRAVENOUS at 12:42

## 2022-01-01 RX ADMIN — FLUTICASONE PROPIONATE 88 MCG: 44 AEROSOL, METERED RESPIRATORY (INHALATION) at 08:42

## 2022-01-01 RX ADMIN — POLYETHYLENE GLYCOL 3350, SODIUM SULFATE ANHYDROUS, SODIUM BICARBONATE, SODIUM CHLORIDE, POTASSIUM CHLORIDE 4 L: 236; 22.74; 6.74; 5.86; 2.97 POWDER, FOR SOLUTION ORAL at 18:26

## 2022-01-01 RX ADMIN — ASPIRIN 81 MG: 81 TABLET, COATED ORAL at 08:51

## 2022-01-01 RX ADMIN — BUDESONIDE AND FORMOTEROL FUMARATE DIHYDRATE 2 PUFF: 160; 4.5 AEROSOL RESPIRATORY (INHALATION) at 06:52

## 2022-01-01 RX ADMIN — IPRATROPIUM BROMIDE AND ALBUTEROL SULFATE 3 ML: 2.5; .5 SOLUTION RESPIRATORY (INHALATION) at 19:55

## 2022-01-01 RX ADMIN — OMEPRAZOLE 40 MG: 20 CAPSULE, DELAYED RELEASE ORAL at 16:51

## 2022-01-01 RX ADMIN — HEPARIN SODIUM 2000 UNITS: 200 INJECTION, SOLUTION INTRAVENOUS at 10:10

## 2022-01-01 RX ADMIN — CLOPIDOGREL BISULFATE 75 MG: 75 TABLET ORAL at 05:56

## 2022-01-01 RX ADMIN — ATORVASTATIN CALCIUM 40 MG: 40 TABLET, FILM COATED ORAL at 17:39

## 2022-01-01 RX ADMIN — FENTANYL CITRATE 100 MCG: 50 INJECTION INTRAMUSCULAR; INTRAVENOUS at 14:13

## 2022-01-01 RX ADMIN — FUROSEMIDE 40 MG: 40 TABLET ORAL at 04:27

## 2022-01-01 RX ADMIN — HEPARIN SODIUM: 1000 INJECTION, SOLUTION INTRAVENOUS; SUBCUTANEOUS at 12:33

## 2022-01-01 RX ADMIN — PANTOPRAZOLE SODIUM 40 MG: 40 INJECTION, POWDER, LYOPHILIZED, FOR SOLUTION INTRAVENOUS at 16:47

## 2022-01-01 RX ADMIN — SODIUM CHLORIDE 1000 ML: 9 INJECTION, SOLUTION INTRAVENOUS at 07:00

## 2022-01-01 RX ADMIN — CLOPIDOGREL BISULFATE 75 MG: 75 TABLET ORAL at 18:04

## 2022-01-01 RX ADMIN — FUROSEMIDE 40 MG: 10 INJECTION, SOLUTION INTRAMUSCULAR; INTRAVENOUS at 05:56

## 2022-01-01 RX ADMIN — FUROSEMIDE 40 MG: 10 INJECTION, SOLUTION INTRAMUSCULAR; INTRAVENOUS at 09:31

## 2022-01-01 RX ADMIN — FERROUS SULFATE TAB 325 MG (65 MG ELEMENTAL FE) 325 MG: 325 (65 FE) TAB at 08:52

## 2022-01-01 RX ADMIN — MIDAZOLAM HYDROCHLORIDE 1 MG: 1 INJECTION, SOLUTION INTRAMUSCULAR; INTRAVENOUS at 14:17

## 2022-01-01 RX ADMIN — ATORVASTATIN CALCIUM 40 MG: 40 TABLET, FILM COATED ORAL at 06:16

## 2022-01-01 RX ADMIN — APIXABAN 2.5 MG: 5 TABLET, FILM COATED ORAL at 18:28

## 2022-01-01 RX ADMIN — ASPIRIN 81 MG: 81 TABLET, COATED ORAL at 04:25

## 2022-01-01 RX ADMIN — CLOPIDOGREL BISULFATE 75 MG: 75 TABLET ORAL at 16:39

## 2022-01-01 RX ADMIN — PROPOFOL 20 MG: 10 INJECTION, EMULSION INTRAVENOUS at 08:08

## 2022-01-01 RX ADMIN — FLUTICASONE PROPIONATE 88 MCG: 44 AEROSOL, METERED RESPIRATORY (INHALATION) at 06:40

## 2022-01-01 RX ADMIN — NALOXONE HYDROCHLORIDE 1 MG: 1 INJECTION PARENTERAL at 14:27

## 2022-01-01 RX ADMIN — ASPIRIN 81 MG: 81 TABLET, COATED ORAL at 04:27

## 2022-01-01 RX ADMIN — SENNOSIDES AND DOCUSATE SODIUM 2 TABLET: 50; 8.6 TABLET ORAL at 05:56

## 2022-01-01 RX ADMIN — OXYCODONE HYDROCHLORIDE AND ACETAMINOPHEN 1 TABLET: 5; 325 TABLET ORAL at 23:13

## 2022-01-01 RX ADMIN — TIOTROPIUM BROMIDE INHALATION SPRAY 5 MCG: 3.12 SPRAY, METERED RESPIRATORY (INHALATION) at 14:43

## 2022-01-01 RX ADMIN — NITROGLYCERIN 10 ML: 20 INJECTION INTRAVENOUS at 10:10

## 2022-01-01 RX ADMIN — SODIUM CHLORIDE, POTASSIUM CHLORIDE, SODIUM LACTATE AND CALCIUM CHLORIDE: 600; 310; 30; 20 INJECTION, SOLUTION INTRAVENOUS at 07:44

## 2022-01-01 RX ADMIN — DILTIAZEM HYDROCHLORIDE 10 MG: 5 INJECTION INTRAVENOUS at 21:51

## 2022-01-01 RX ADMIN — PROPOFOL 50 MG: 10 INJECTION, EMULSION INTRAVENOUS at 07:54

## 2022-01-01 RX ADMIN — FERROUS SULFATE TAB 325 MG (65 MG ELEMENTAL FE) 325 MG: 325 (65 FE) TAB at 08:30

## 2022-01-01 RX ADMIN — CLOPIDOGREL BISULFATE 75 MG: 75 TABLET ORAL at 16:45

## 2022-01-01 RX ADMIN — FENTANYL CITRATE 50 MCG: 50 INJECTION, SOLUTION INTRAMUSCULAR; INTRAVENOUS at 14:18

## 2022-01-01 RX ADMIN — LIDOCAINE HYDROCHLORIDE: 20 INJECTION, SOLUTION INFILTRATION; PERINEURAL at 10:09

## 2022-01-01 RX ADMIN — ACETAMINOPHEN 650 MG: 325 TABLET, FILM COATED ORAL at 20:49

## 2022-01-01 RX ADMIN — POTASSIUM CHLORIDE 20 MEQ: 20 TABLET, EXTENDED RELEASE ORAL at 04:28

## 2022-01-01 RX ADMIN — ATORVASTATIN CALCIUM 40 MG: 40 TABLET, FILM COATED ORAL at 16:40

## 2022-01-01 RX ADMIN — FENTANYL CITRATE 50 MCG: 50 INJECTION, SOLUTION INTRAMUSCULAR; INTRAVENOUS at 07:51

## 2022-01-01 RX ADMIN — UMECLIDINIUM BROMIDE AND VILANTEROL TRIFENATATE 1 PUFF: 62.5; 25 POWDER RESPIRATORY (INHALATION) at 05:50

## 2022-01-01 RX ADMIN — OXYCODONE HYDROCHLORIDE AND ACETAMINOPHEN 1 TABLET: 5; 325 TABLET ORAL at 16:46

## 2022-01-01 RX ADMIN — DIGOXIN 250 MCG: 250 INJECTION, SOLUTION INTRAMUSCULAR; INTRAVENOUS; PARENTERAL at 08:30

## 2022-01-01 RX ADMIN — ASPIRIN 81 MG: 81 TABLET, COATED ORAL at 05:24

## 2022-01-01 RX ADMIN — TIOTROPIUM BROMIDE INHALATION SPRAY 5 MCG: 3.12 SPRAY, METERED RESPIRATORY (INHALATION) at 10:02

## 2022-01-01 RX ADMIN — IOHEXOL 100 ML: 350 INJECTION, SOLUTION INTRAVENOUS at 09:45

## 2022-01-01 RX ADMIN — UMECLIDINIUM BROMIDE AND VILANTEROL TRIFENATATE 1 PUFF: 62.5; 25 POWDER RESPIRATORY (INHALATION) at 08:42

## 2022-01-01 RX ADMIN — ATORVASTATIN CALCIUM 40 MG: 40 TABLET, FILM COATED ORAL at 16:45

## 2022-01-01 RX ADMIN — MORPHINE SULFATE 5 MG: 10 INJECTION INTRAVENOUS at 14:34

## 2022-01-01 RX ADMIN — BUDESONIDE AND FORMOTEROL FUMARATE DIHYDRATE 2 PUFF: 160; 4.5 AEROSOL RESPIRATORY (INHALATION) at 10:03

## 2022-01-01 RX ADMIN — PANTOPRAZOLE SODIUM 40 MG: 40 INJECTION, POWDER, LYOPHILIZED, FOR SOLUTION INTRAVENOUS at 05:24

## 2022-01-01 RX ADMIN — SENNOSIDES AND DOCUSATE SODIUM 2 TABLET: 50; 8.6 TABLET ORAL at 16:04

## 2022-01-01 RX ADMIN — POTASSIUM CHLORIDE 20 MEQ: 20 TABLET, EXTENDED RELEASE ORAL at 18:04

## 2022-01-01 RX ADMIN — CLOPIDOGREL BISULFATE 600 MG: 300 TABLET, FILM COATED ORAL at 07:27

## 2022-01-01 RX ADMIN — OMEPRAZOLE 40 MG: 20 CAPSULE, DELAYED RELEASE ORAL at 06:22

## 2022-01-01 RX ADMIN — APIXABAN 2.5 MG: 5 TABLET, FILM COATED ORAL at 05:48

## 2022-01-01 RX ADMIN — OXYCODONE HYDROCHLORIDE AND ACETAMINOPHEN 0.5 TABLET: 5; 325 TABLET ORAL at 18:04

## 2022-01-01 RX ADMIN — POTASSIUM CHLORIDE 20 MEQ: 20 TABLET, EXTENDED RELEASE ORAL at 05:24

## 2022-01-01 RX ADMIN — ATORVASTATIN CALCIUM 40 MG: 40 TABLET, FILM COATED ORAL at 16:04

## 2022-01-01 RX ADMIN — SENNOSIDES AND DOCUSATE SODIUM 2 TABLET: 50; 8.6 TABLET ORAL at 04:28

## 2022-01-01 RX ADMIN — HEPARIN SODIUM: 1000 INJECTION, SOLUTION INTRAVENOUS; SUBCUTANEOUS at 12:51

## 2022-01-01 RX ADMIN — IPRATROPIUM BROMIDE AND ALBUTEROL SULFATE 3 ML: 2.5; .5 SOLUTION RESPIRATORY (INHALATION) at 08:42

## 2022-01-01 RX ADMIN — ATORVASTATIN CALCIUM 40 MG: 40 TABLET, FILM COATED ORAL at 17:14

## 2022-01-01 RX ADMIN — BUDESONIDE AND FORMOTEROL FUMARATE DIHYDRATE 2 PUFF: 160; 4.5 AEROSOL RESPIRATORY (INHALATION) at 21:07

## 2022-01-01 RX ADMIN — OMEPRAZOLE 40 MG: 20 CAPSULE, DELAYED RELEASE ORAL at 05:48

## 2022-01-01 RX ADMIN — CEFTRIAXONE SODIUM 2 G: 10 INJECTION, POWDER, FOR SOLUTION INTRAVENOUS at 03:25

## 2022-01-01 RX ADMIN — ASPIRIN 81 MG: 81 TABLET, COATED ORAL at 05:48

## 2022-01-01 RX ADMIN — FENTANYL CITRATE 100 MCG: 50 INJECTION INTRAMUSCULAR; INTRAVENOUS at 11:40

## 2022-01-01 RX ADMIN — OXYCODONE HYDROCHLORIDE AND ACETAMINOPHEN 1 TABLET: 5; 325 TABLET ORAL at 01:06

## 2022-01-01 RX ADMIN — PANTOPRAZOLE SODIUM 40 MG: 40 INJECTION, POWDER, LYOPHILIZED, FOR SOLUTION INTRAVENOUS at 18:03

## 2022-01-01 RX ADMIN — POTASSIUM CHLORIDE 20 MEQ: 20 TABLET, EXTENDED RELEASE ORAL at 16:45

## 2022-01-01 RX ADMIN — DILTIAZEM HYDROCHLORIDE 10 MG: 5 INJECTION INTRAVENOUS at 22:21

## 2022-01-01 RX ADMIN — CLOPIDOGREL BISULFATE 75 MG: 75 TABLET ORAL at 06:16

## 2022-01-01 RX ADMIN — FUROSEMIDE 40 MG: 10 INJECTION, SOLUTION INTRAMUSCULAR; INTRAVENOUS at 06:16

## 2022-01-01 RX ADMIN — BUDESONIDE AND FORMOTEROL FUMARATE DIHYDRATE 2 PUFF: 160; 4.5 AEROSOL RESPIRATORY (INHALATION) at 14:44

## 2022-01-01 RX ADMIN — PROPOFOL 20 MG: 10 INJECTION, EMULSION INTRAVENOUS at 08:15

## 2022-01-01 RX ADMIN — UMECLIDINIUM BROMIDE AND VILANTEROL TRIFENATATE 1 PUFF: 62.5; 25 POWDER RESPIRATORY (INHALATION) at 06:40

## 2022-01-01 RX ADMIN — ATORVASTATIN CALCIUM 40 MG: 40 TABLET, FILM COATED ORAL at 05:56

## 2022-01-01 RX ADMIN — PANTOPRAZOLE SODIUM 40 MG: 40 INJECTION, POWDER, LYOPHILIZED, FOR SOLUTION INTRAVENOUS at 04:28

## 2022-01-01 ASSESSMENT — ENCOUNTER SYMPTOMS
WEIGHT LOSS: 0
PALPITATIONS: 1
CHILLS: 0
SPUTUM PRODUCTION: 1
NAUSEA: 0
FOCAL WEAKNESS: 0
BLURRED VISION: 0
SHORTNESS OF BREATH: 1
COUGH: 0
SHORTNESS OF BREATH: 1
SENSORY CHANGE: 0
DIZZINESS: 0
FALLS: 0
COUGH: 0
WEAKNESS: 0
HEADACHES: 0
VOMITING: 0
COUGH: 0
ABDOMINAL PAIN: 0
WHEEZING: 0
DOUBLE VISION: 0
ABDOMINAL PAIN: 0
SPUTUM PRODUCTION: 1
ABDOMINAL PAIN: 0
COUGH: 1
WEIGHT LOSS: 0
COUGH: 0
EYE PAIN: 0
VOMITING: 0
NEUROLOGICAL NEGATIVE: 1
PALPITATIONS: 0
NERVOUS/ANXIOUS: 0
CLAUDICATION: 0
CHILLS: 0
SEIZURES: 0
VOMITING: 0
FEVER: 0
HEARTBURN: 0
CHILLS: 0
SHORTNESS OF BREATH: 1
PSYCHIATRIC NEGATIVE: 1
WEAKNESS: 0
SORE THROAT: 0
DEPRESSION: 0
GASTROINTESTINAL NEGATIVE: 1
WEIGHT LOSS: 0
FEVER: 0
MYALGIAS: 1
DIZZINESS: 0
BACK PAIN: 1
COUGH: 1
CHILLS: 0
SENSORY CHANGE: 0
PND: 0
CHILLS: 0
SPEECH CHANGE: 0
COUGH: 0
SORE THROAT: 0
CHILLS: 0
BLURRED VISION: 0
ORTHOPNEA: 1
FEVER: 0
PALPITATIONS: 0
STRIDOR: 0
WEIGHT LOSS: 0
FEVER: 0
DIARRHEA: 0
MUSCULOSKELETAL NEGATIVE: 1
SHORTNESS OF BREATH: 1
NERVOUS/ANXIOUS: 0
LOSS OF CONSCIOUSNESS: 0
NAUSEA: 0
FOCAL WEAKNESS: 0
NAUSEA: 0
NAUSEA: 0
PND: 1
FEVER: 0
BLOOD IN STOOL: 0
STRIDOR: 0
BACK PAIN: 0
NAUSEA: 0
SHORTNESS OF BREATH: 1
CHILLS: 0
SHORTNESS OF BREATH: 1
NAUSEA: 0
PALPITATIONS: 0
ROS GI COMMENTS: HE HAS BEEN NPO
ABDOMINAL PAIN: 0
PALPITATIONS: 0
DIZZINESS: 0
ABDOMINAL PAIN: 0
PALPITATIONS: 0
EYE DISCHARGE: 0
PALPITATIONS: 0
CHILLS: 0
COUGH: 0
DIZZINESS: 0
FEVER: 0
NAUSEA: 0
VOMITING: 0
DEPRESSION: 0
DIZZINESS: 0
ABDOMINAL PAIN: 0
BLURRED VISION: 1
SORE THROAT: 0
SPEECH CHANGE: 0
EYE DISCHARGE: 0
SORE THROAT: 0
SHORTNESS OF BREATH: 0
WEAKNESS: 0
SHORTNESS OF BREATH: 1
DIZZINESS: 0
ORTHOPNEA: 0
DIARRHEA: 0
SHORTNESS OF BREATH: 1
VOMITING: 0
BLURRED VISION: 0
ABDOMINAL PAIN: 0
PALPITATIONS: 0
NERVOUS/ANXIOUS: 0
FEVER: 0
COUGH: 1
SHORTNESS OF BREATH: 0
ABDOMINAL PAIN: 0
DEPRESSION: 0
CONSTIPATION: 0
MYALGIAS: 0
SINUS PAIN: 0
SENSORY CHANGE: 0
FEVER: 0
HEADACHES: 0
SPEECH CHANGE: 0
WEAKNESS: 1
BLURRED VISION: 0
PALPITATIONS: 0
PALPITATIONS: 0
PHOTOPHOBIA: 0

## 2022-01-01 ASSESSMENT — CHA2DS2 SCORE
VASCULAR DISEASE: YES
CHF OR LEFT VENTRICULAR DYSFUNCTION: NO
DIABETES: NO
AGE 75 OR GREATER: YES
SEX: MALE
PRIOR STROKE OR TIA OR THROMBOEMBOLISM: NO
CHA2DS2 VASC SCORE: 3
HYPERTENSION: NO
AGE 65 TO 74: NO

## 2022-01-01 ASSESSMENT — LIFESTYLE VARIABLES
TOTAL SCORE: 0
HAVE YOU EVER FELT YOU SHOULD CUT DOWN ON YOUR DRINKING: NO
EVER FELT BAD OR GUILTY ABOUT YOUR DRINKING: NO
DOES PATIENT WANT TO STOP DRINKING: NO
HAVE PEOPLE ANNOYED YOU BY CRITICIZING YOUR DRINKING: NO
TOTAL SCORE: 0
DO YOU DRINK ALCOHOL: NO
SUBSTANCE_ABUSE: 0
AVERAGE NUMBER OF DAYS PER WEEK YOU HAVE A DRINK CONTAINING ALCOHOL: 0
ALCOHOL_USE: NO
EVER HAD A DRINK FIRST THING IN THE MORNING TO STEADY YOUR NERVES TO GET RID OF A HANGOVER: NO
HOW MANY TIMES IN THE PAST YEAR HAVE YOU HAD 5 OR MORE DRINKS IN A DAY: 0
ON A TYPICAL DAY WHEN YOU DRINK ALCOHOL HOW MANY DRINKS DO YOU HAVE: 0
CONSUMPTION TOTAL: NEGATIVE
TOTAL SCORE: 0

## 2022-01-01 ASSESSMENT — COGNITIVE AND FUNCTIONAL STATUS - GENERAL
MOVING TO AND FROM BED TO CHAIR: A LITTLE
STANDING UP FROM CHAIR USING ARMS: A LITTLE
SUGGESTED CMS G CODE MODIFIER MOBILITY: CK
HELP NEEDED FOR BATHING: A LITTLE
MOVING TO AND FROM BED TO CHAIR: A LITTLE
CLIMB 3 TO 5 STEPS WITH RAILING: A LITTLE
MOVING FROM LYING ON BACK TO SITTING ON SIDE OF FLAT BED: A LITTLE
SUGGESTED CMS G CODE MODIFIER MOBILITY: CK
MOBILITY SCORE: 18
STANDING UP FROM CHAIR USING ARMS: A LITTLE
MOBILITY SCORE: 18
WALKING IN HOSPITAL ROOM: A LITTLE
CLIMB 3 TO 5 STEPS WITH RAILING: A LITTLE
TOILETING: A LITTLE
TURNING FROM BACK TO SIDE WHILE IN FLAT BAD: A LITTLE
WALKING IN HOSPITAL ROOM: A LITTLE
DRESSING REGULAR LOWER BODY CLOTHING: A LOT
SUGGESTED CMS G CODE MODIFIER DAILY ACTIVITY: CK
DRESSING REGULAR UPPER BODY CLOTHING: A LITTLE
MOVING FROM LYING ON BACK TO SITTING ON SIDE OF FLAT BED: A LITTLE
TURNING FROM BACK TO SIDE WHILE IN FLAT BAD: A LITTLE
DAILY ACTIVITIY SCORE: 18
PERSONAL GROOMING: A LITTLE

## 2022-01-01 ASSESSMENT — PAIN DESCRIPTION - PAIN TYPE
TYPE: ACUTE PAIN

## 2022-01-01 ASSESSMENT — PATIENT HEALTH QUESTIONNAIRE - PHQ9
1. LITTLE INTEREST OR PLEASURE IN DOING THINGS: NOT AT ALL
SUM OF ALL RESPONSES TO PHQ9 QUESTIONS 1 AND 2: 0
2. FEELING DOWN, DEPRESSED, IRRITABLE, OR HOPELESS: NOT AT ALL
1. LITTLE INTEREST OR PLEASURE IN DOING THINGS: NOT AT ALL
CLINICAL INTERPRETATION OF PHQ2 SCORE: 0
SUM OF ALL RESPONSES TO PHQ9 QUESTIONS 1 AND 2: 0
2. FEELING DOWN, DEPRESSED, IRRITABLE, OR HOPELESS: NOT AT ALL
1. LITTLE INTEREST OR PLEASURE IN DOING THINGS: NOT AT ALL
2. FEELING DOWN, DEPRESSED, IRRITABLE, OR HOPELESS: NOT AT ALL
SUM OF ALL RESPONSES TO PHQ9 QUESTIONS 1 AND 2: 0

## 2022-01-01 ASSESSMENT — COPD QUESTIONNAIRES
COPD SCREENING SCORE: 4
HAVE YOU SMOKED AT LEAST 100 CIGARETTES IN YOUR ENTIRE LIFE: YES
DURING THE PAST 4 WEEKS HOW MUCH DID YOU FEEL SHORT OF BREATH: NONE/LITTLE OF THE TIME
DO YOU EVER COUGH UP ANY MUCUS OR PHLEGM?: NO/ONLY WITH OCCASIONAL COLDS OR INFECTIONS

## 2022-01-01 ASSESSMENT — FIBROSIS 4 INDEX
FIB4 SCORE: 1.67
FIB4 SCORE: 2.33
FIB4 SCORE: 4.91
FIB4 SCORE: 4.17
FIB4 SCORE: 3.72
FIB4 SCORE: 2.87
FIB4 SCORE: 1.67
FIB4 SCORE: 1.67
FIB4 SCORE: 4.62
FIB4 SCORE: 1.2
FIB4 SCORE: 2.35

## 2022-01-01 ASSESSMENT — PAIN SCALES - GENERAL: PAIN_LEVEL: 0

## 2022-01-01 ASSESSMENT — GAIT ASSESSMENTS
ASSISTIVE DEVICE: FRONT WHEEL WALKER
DEVIATION: SHUFFLED GAIT;BRADYKINETIC
ASSISTIVE DEVICE: FRONT WHEEL WALKER
DISTANCE (FEET): 75
GAIT LEVEL OF ASSIST: SUPERVISED
DISTANCE (FEET): 150
GAIT LEVEL OF ASSIST: SUPERVISED

## 2022-01-01 ASSESSMENT — ACTIVITIES OF DAILY LIVING (ADL): TOILETING: INDEPENDENT

## 2022-02-03 PROBLEM — I44.0 FIRST DEGREE AV BLOCK: Status: ACTIVE | Noted: 2021-01-01

## 2022-02-03 PROBLEM — H35.30 MACULAR DEGENERATION: Status: ACTIVE | Noted: 2022-01-01

## 2022-02-03 PROBLEM — R05.9 COUGH: Status: ACTIVE | Noted: 2022-01-01

## 2022-02-03 PROBLEM — J96.01 ACUTE RESPIRATORY FAILURE WITH HYPOXEMIA (HCC): Status: ACTIVE | Noted: 2022-01-01

## 2022-02-03 PROBLEM — E78.5 DYSLIPIDEMIA: Status: ACTIVE | Noted: 2021-01-01

## 2022-02-03 PROBLEM — M54.9 BACK PAIN: Status: ACTIVE | Noted: 2022-01-01

## 2022-02-03 PROBLEM — I44.7 LBBB (LEFT BUNDLE BRANCH BLOCK): Status: ACTIVE | Noted: 2021-01-01

## 2022-02-03 PROBLEM — I35.0 NONRHEUMATIC AORTIC VALVE STENOSIS: Status: ACTIVE | Noted: 2021-01-01

## 2022-02-03 PROBLEM — J15.9 SECONDARY BACTERIAL PNEUMONIA: Status: ACTIVE | Noted: 2022-01-01
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
## 2022-02-03 PROBLEM — U07.1 COVID: Status: ACTIVE | Noted: 2022-01-01

## 2022-02-24 NOTE — PROGRESS NOTES
REFERRING PHYSICIAN: Nicole Tse PA-C.    CONSULTING PHYSICIAN: Yuri Yoon MD, FACS.    CHIEF COMPLAINT: Shortness of breath.    HISTORY OF PRESENT ILLNESS: The patient is an 86 y.o. male with history of hypertension, hyperlipidemia, aspestos exposure, presumed COPD, history of tobacco use, chronic oxygen use 3 L/min, ascending aortic aneurysm, mild mitral stenosis with mitral annular calcifications, chronic left bundle branch block coronary artery disease and severe aortic stenosis. He had COVID-19 pneumonia and was admitted to [facility] in January 2022.  He continued to have symptoms and ended up needing home oxygen and is now on 3 L/min chronically.  He was admitted to [facility] on 2/19/2022 and was given a diagnosis of pneumonia and put on antibiotics and congestive heart failure with a BNP of 11,000.  He continues to be extremely short of breath with exertion.  He can only walk about 36 feet before having to stop and rest.  He has 1-2+ lower extremity edema.  He has 2-3 pillow orthopnea.  During his hospitalization he had an echocardiogram that showed mild mitral stenosis and severe aortic stenosis.  He had a cardiac catheterization that showed multivessel coronary artery disease.    PAST MEDICAL HISTORY:   Past Medical History:   Diagnosis Date   • CHF (congestive heart failure) (HCC)    • Heart murmur    • Hyperlipidemia    • Hypertension        PAST SURGICAL HISTORY:   Past Surgical History:   Procedure Laterality Date   • HERNIA REPAIR         FAMILY HISTORY:   Family History   Problem Relation Age of Onset   • No Known Problems Mother    • Heart Disease Father    • Heart Disease Sister    • No Known Problems Sister         SOCIAL HISTORY:   Social History     Socioeconomic History   • Marital status:      Spouse name: Not on file   • Number of children: Not on file   • Years of education: Not on file   • Highest education level: Not on file   Occupational History   • Not on file    Tobacco Use   • Smoking status: Former Smoker     Packs/day: 2.00     Years: 25.00     Pack years: 50.00     Types: Cigarettes     Quit date:      Years since quittin.1   • Smokeless tobacco: Never Used   Vaping Use   • Vaping Use: Never used   Substance and Sexual Activity   • Alcohol use: Not Currently   • Drug use: Not on file   • Sexual activity: Not on file   Other Topics Concern   • Not on file   Social History Narrative   • Not on file     Social Determinants of Health     Financial Resource Strain: Not on file   Food Insecurity: Not on file   Transportation Needs: Not on file   Physical Activity: Not on file   Stress: Not on file   Social Connections: Not on file   Intimate Partner Violence: Not on file   Housing Stability: Not on file       ALLERGIES:   No Known Allergies     CURRENT MEDICATIONS:     Current Outpatient Medications:   •  potassium chloride ER (KLOR-CON) 10 MEQ tablet, Take 10 mEq by mouth., Disp: , Rfl:   •  furosemide (LASIX) 40 MG Tab, Take 40 mg by mouth every day., Disp: , Rfl:   •  metoprolol SR (TOPROL XL) 25 MG TABLET SR 24 HR, Take 12.5 mg by mouth every day., Disp: , Rfl:   •  atorvastatin (LIPITOR) 20 MG Tab, Take 20 mg by mouth., Disp: , Rfl:   •  benzonatate (TESSALON) 100 MG Cap, Take 100 mg by mouth., Disp: , Rfl:   •  guaiFENesin ER (MUCINEX) 600 MG TABLET SR 12 HR, Take 600 mg by mouth., Disp: , Rfl:   •  traZODone (DESYREL) 50 MG Tab, Take 1 Tablet by mouth every evening., Disp: 30 Tablet, Rfl: 3  •  lactobacillus rhamnosus (CULTURELLE) Cap capsule, Take 1 Capsule by mouth 2 times a day for 30 days., Disp: 60 Capsule, Rfl: 0  •  oxyCODONE-acetaminophen (PERCOCET) 5-325 MG Tab, Take 1 Tablet by mouth every 8 hours as needed for Severe Pain for up to 30 days., Disp: 30 Tablet, Rfl: 0  •  benzonatate (TESSALON) 100 MG Cap, Take 1 Capsule by mouth 3 times a day as needed for Cough for up to 90 days., Disp: 90 Capsule, Rfl: 2  •  acetaminophen (TYLENOL) 325 MG Tab,  Take 650 mg by mouth every 6 hours as needed., Disp: , Rfl:   •  aspirin 81 MG EC tablet, Take 81 mg by mouth every day., Disp: , Rfl:   •  Multiple Vitamin (MULTIVITAMIN ADULT PO), Take 1 Tablet by mouth every day., Disp: , Rfl:   •  guaiFENesin (MUCINEX MAXIMUM STRENGTH PO), Take 1 Tablet by mouth every day., Disp: , Rfl:   •  Multiple Vitamins-Minerals (OCUVITE ADULT FORMULA PO), Take  by mouth., Disp: , Rfl:      LABS REVIEWED:  Lab Results   Component Value Date/Time    SODIUM 133 (L) 2008 09:24 AM    POTASSIUM 4.1 2008 09:24 AM    CHLORIDE 102 2008 09:24 AM    CO2 25 2008 09:24 AM    GLUCOSE 95 2008 09:24 AM    BUN 12 2008 09:24 AM    CREATININE 1.1 2008 09:24 AM      No results found for: PROTHROMBTM, INR   Lab Results   Component Value Date/Time    WBC 7.2 2008 09:24 AM    RBC 4.98 2008 09:24 AM    HEMOGLOBIN 14.2 2008 09:24 AM    HEMATOCRIT 40.4 (L) 2008 09:24 AM    MCV 81.0 2008 09:24 AM    MCH 28.5 2008 09:24 AM    MCHC 35.1 (H) 2008 09:24 AM    MPV 11.7 (H) 2008 09:24 AM    NEUTSPOLYS 82.1 (H) 2008 09:24 AM    LYMPHOCYTES 9.7 (L) 2008 09:24 AM    MONOCYTES 3.7 2008 09:24 AM    EOSINOPHILS 3.9 2008 09:24 AM    BASOPHILS 0.7 2008 09:24 AM        IMAGING REVIEWED AND INTERPRETED:    ECHOCARDIOGRAM: 22 [facility]    1. The left ventricular cavity size is decreased (appears volume depleted) with hyperdynamic systolic function and an EF of 85-90% by Rockwell's biplane. Moderate concentric hypertrophy with significant LVOT gradient and obstructive systolic anterior motion of the mitral valve. No VSD or thrombus. Cannot determine diastolic function due to mitral annular calcification.  2. Heavily calcified degenerative aortic valve with severe stenosis. AVmaxP mmHg, MeanP mmHg, MaxV: 417 cm/s. Mild insufficency.  3. Severe mitral annular calcification with mild mitral valve stenosis.  MaxP mmHg, MeanP.8 mmHg, MVA: 2.1 cm2.  4. The right ventricle is normal in size and function.  5. No prior echo for comparison.    ANGIOGRAM: 22  Right coronary artery: Dominant vessel diffuse disease is present with 50 to 70% narrowing in the mid third  Left main trunk: A radiolucent 50 to 70% narrowing in the distal part of the short left main.  Left anterior descending: After the major diagonal branch is totally occluded and fills by the right coronary artery by collaterals.  Circumflex: Supplies a large obtuse marginal branch which has 2, 80- 90% lesions in the proximal part of the vessel.    CT Pulmonary Embolism 22  NO CTA EVIDENCE OF PULMONARY EMBOLISM OR AORTIC DISSECTION NEW TRACE RIGHT AND SMALL LEFT PLEURAL EFFUSIONS STABLE CALCIFIED PLEURAL PLAQUES AND INTERSTITIAL LUNG DISEASE SECONDARY TO ASBESTOSIS INCIDENTAL FINDINGS AS DETAILED ABOVE    REVIEW OF SYSTEMS:   Review of Systems   Constitutional: Positive for malaise/fatigue.   HENT: Positive for hearing loss.    Eyes: Positive for blurred vision.   Respiratory: Positive for shortness of breath.    Cardiovascular: Positive for leg swelling.   Gastrointestinal: Negative.    Genitourinary: Negative.    Musculoskeletal: Negative.    Skin: Negative.    Neurological: Negative.    Endo/Heme/Allergies: Negative.    Psychiatric/Behavioral: Negative.      PHYSICAL EXAMINATION:   /62 (BP Location: Right arm, Patient Position: Sitting, BP Cuff Size: Adult)   Pulse 69   Temp 36.4 °C (97.5 °F) (Temporal)   Ht 1.829 m (6')   Wt 84.4 kg (186 lb)   SpO2 94%   BMI 25.23 kg/m².    Physical Exam  Constitutional:       Comments: Frail   HENT:      Mouth/Throat:      Mouth: Mucous membranes are moist.   Eyes:      Pupils: Pupils are equal, round, and reactive to light.   Cardiovascular:      Rate and Rhythm: Regular rhythm.      Heart sounds: Murmur heard.   Pulmonary:      Effort: Tachypnea and accessory muscle usage present.   Abdominal:       Palpations: Abdomen is soft.   Musculoskeletal:      Right lower leg: Edema present.      Left lower leg: Edema present.      Comments: Cachectic     Skin:     General: Skin is warm and dry.   Neurological:      Mental Status: He is alert and oriented to person, place, and time.         IMPRESSION:  Severe symptomatic aortic stenosis, acute on chronic congestive heart failure with preserved ejection fraction, coronary artery disease, presumed COPD (on 3 L/min home oxygen), ascending aortic aneurysm (4.5 cm), mild mitral stenosis, left bundle branch block, recent Covid pneumonia, history of tobacco abuse, history of asbestos exposure.    PLAN:  I do not recommend aortic valve replacement, ascending aortic aneurysm repair, coronary artery bypass grafting, endoscopic vein harvest and intraoperative transesophageal echocardiography due to excessive operative risk.  The patient will not survive this extensive an operation.The STS mortality risk score is 11% and the morbidity and mortality risk score is 43% CABG/AVR. The scores were discussed with patient.    Transcatheter aortic valve replacement (TAVR) is a consideration and his coronary artery disease will need to be evaluated by an interventional cardiologist.  The procedure, its risks, benefits, potential complications and alternative treatments were discussed with the patient in detail.  The patient will be referred to the structural heart disease clinic [facility] (per patient's request).    Findings and recommendations have been discussed with the patient’s primary care provider, Nicole Tse PA-C.  Thank you for this very challenging consultation and participation in the patient’s care.  I will keep you apprised of all future developments.    Sincerely,    Yuri Yoon MD, FACS.

## 2022-03-04 NOTE — TELEPHONE ENCOUNTER
Referral from: Nicole Tse PA-C    Patient called on 3/4/2022.    Discussed with patient/son Tenzin consultation appointments, testing needed, and plan of care.    Patient given dates and times of testing and consultations.    All questions answered.    Phone number given to patient for [facility] for any further questions or concerns.

## 2022-03-04 NOTE — TELEPHONE ENCOUNTER
----- Message from Jane Gutierrez R.N. sent at 3/4/2022 11:09 AM PST -----  Regarding: FW: New AS consult  Can you please request echo/cath image cds from [facility]?   ----- Message -----  From: HIMANSHU Poole  Sent: 3/3/2022   2:24 PM PST  To: Jane Gutierrez R.N.  Subject: New AS consult                                   Referral from Nicole Tse PA-C, PCP.    [facility] records need to obtain CD's for echo and cath. MEDIA report for echo and cath at [facility].    Consult with BE on 3/16 SLOT D.    No cath or CTS consult, already done.    CT on 3/17.    TAVR 3/28.    Thanks,    Gypsy

## 2022-03-16 NOTE — TELEPHONE ENCOUNTER
Per patients request, patient is scheduled on 3-31-22 for a JOHNSON w/anesthesia with . Patient was told to hold lasix AM day of procedure and to check in at 8:00 for a 10:00 procedure. H&P was done on 3-16-22 by Dr. Acosta. Pre admit to call patient.

## 2022-03-16 NOTE — PROGRESS NOTES
CARDIOLOGY NEW PATIENT CONSULTATION    PCP: Nicole Tse P.A.-C.    1. Nonrheumatic aortic valve stenosis    2. Left ventricular outflow tract obstruction    3. 3-vessel CAD    4. First degree AV block    5. LBBB (left bundle branch block)    6. Nonrheumatic mitral valve stenosis    7. Hypoxia    8. Thoracic aortic aneurysm without rupture (HCC) - 4.5 cm        Delonte Alexander has class IV dyspnea on exertion following Covid infection in January.  He is not a surgical candidate and has left main and three-vessel coronary artery disease, aortic valve disease as well as dynamic LVOT obstruction.  I am doubtful there is significant LVOT obstruction based on today's exam but before we can safely replace the aortic valve we need to assess the significance of the LV OT obstruction in relation to the aortic valve and I therefore arranged a transesophageal echocardiogram and doubled the metoprolol.  I also have requested the images from catheterization at [place].      I did discuss with the patient and his daughter the need for complex percutaneous coronary intervention, as well as TAVR.  We discussed the risks of both disability and death during these procedures as well as the potential need for pacemaker stroke, access site complication.  Both are in agreement with proceeding with what ever is ultimately recommended.      Follow up: 4 weeks    Chief Complaint   Patient presents with   • Dyslipidemia   • Aortic Stenosis     NP Dx: Nonrheumatic aortic valve stenosis       History: Delonte Alexander is a 86 y.o. male with history of thoracic aortic aneurysm who was well until January when he suffered a Covid infection.  He was hospitalized in this been left with persistent hypoxia and exertional fatigue has been found to have severe aortic stenosis, dynamic LVOT obstruction and multivessel coronary artery disease including the left main, total occlusion of the LAD, severe stenosis in the circumflex and moderate to severe  stenosis in the right coronary artery.  He has been turned down for open surgery due to operative risk.     He is accompanied by his daughter Yvonne today    ROS:   All other systems reviewed and negative except as per the HPI    PE:  /74 (BP Location: Left arm, Patient Position: Sitting, BP Cuff Size: Adult)   Pulse 70   Resp 16   Ht 1.829 m (6')   Wt 82.1 kg (181 lb)   SpO2 91%   BMI 24.55 kg/m²   Gen: no acute distress  HEENT: Symmetric face. Anicteric sclerae. Moist mucus membranes  NECK: No JVD. No lymphadenopathy  CARDIAC: Regular, Normal S1, S2, +systolic murmur -no provocation with Valsalva  VASCULATURE: Diminished and delayed upstroke  RESP: Clear to auscultation bilaterally  ABD: Soft, non-tender, non-distended  EXT: 2+ lower extremity edema, no clubbing or cyanosis  SKIN: Warm and dry  NEURO: No gross deficits  PSYCH: Appropriate affect, participates in conversation    The ASCVD Risk score ([place] CHRISTY Jr, et al., 2013) failed to calculate.    Past Medical History:   Diagnosis Date   • CHF (congestive heart failure) (HCC)    • Heart murmur    • Hyperlipidemia    • Hypertension      Past Surgical History:   Procedure Laterality Date   • HERNIA REPAIR       No Known Allergies  Outpatient Encounter Medications as of 3/16/2022   Medication Sig Dispense Refill   • metoprolol SR (TOPROL XL) 25 MG TABLET SR 24 HR Take 1 Tablet by mouth every day for 30 days. 90 Tablet 3   • ipratropium-albuterol (DUONEB) 0.5-2.5 (3) MG/3ML nebulizer solution Take 3 mL by nebulization 2 times a day.     • Fluticasone-Umeclidin-Vilant (TRELEGY ELLIPTA INH) Inhale 1 Puff every day.     • benzonatate (TESSALON) 200 MG capsule Take 1 Capsule by mouth 3 times a day as needed for Cough. 90 Capsule 5   • potassium chloride ER (KLOR-CON) 10 MEQ tablet Take 10 mEq by mouth.     • furosemide (LASIX) 40 MG Tab Take 40 mg by mouth every day.     • atorvastatin (LIPITOR) 20 MG Tab Take 20 mg by mouth.     • guaiFENesin ER (MUCINEX) 600  MG TABLET SR 12 HR Take 600 mg by mouth.     • traZODone (DESYREL) 50 MG Tab Take 1 Tablet by mouth every evening. 30 Tablet 3   • acetaminophen (TYLENOL) 325 MG Tab Take 650 mg by mouth every 6 hours as needed.     • aspirin 81 MG EC tablet Take 81 mg by mouth every day.     • Multiple Vitamin (MULTIVITAMIN ADULT PO) Take 1 Tablet by mouth every day.     • guaiFENesin (MUCINEX MAXIMUM STRENGTH PO) Take 1 Tablet by mouth every day.     • Multiple Vitamins-Minerals (OCUVITE ADULT FORMULA PO) Take  by mouth.     • [DISCONTINUED] metoprolol SR (TOPROL XL) 25 MG TABLET SR 24 HR Take 12.5 mg by mouth every day.       No facility-administered encounter medications on file as of 3/16/2022.     Social History     Socioeconomic History   • Marital status:      Spouse name: Not on file   • Number of children: Not on file   • Years of education: Not on file   • Highest education level: Not on file   Occupational History   • Not on file   Tobacco Use   • Smoking status: Former Smoker     Packs/day: 2.00     Years: 25.00     Pack years: 50.00     Types: Cigarettes     Quit date:      Years since quittin.2   • Smokeless tobacco: Never Used   Vaping Use   • Vaping Use: Never used   Substance and Sexual Activity   • Alcohol use: Not Currently   • Drug use: Not on file   • Sexual activity: Not on file   Other Topics Concern   • Not on file   Social History Narrative   • Not on file     Social Determinants of Health     Financial Resource Strain: Not on file   Food Insecurity: Not on file   Transportation Needs: Not on file   Physical Activity: Not on file   Stress: Not on file   Social Connections: Not on file   Intimate Partner Violence: Not on file   Housing Stability: Not on file     Family History   Problem Relation Age of Onset   • No Known Problems Mother    • Heart Disease Father    • Heart Disease Sister    • No Known Problems Sister          Studies  No results found for: CHOLSTRLTOT, LDL, HDL, TRIGLYCERIDE     Lab Results   Component Value Date/Time    SODIUM 133 (L) 02/29/2008 09:24 AM    POTASSIUM 4.1 02/29/2008 09:24 AM    CHLORIDE 102 02/29/2008 09:24 AM    CO2 25 02/29/2008 09:24 AM    GLUCOSE 95 02/29/2008 09:24 AM    BUN 12 02/29/2008 09:24 AM    CREATININE 1.1 02/29/2008 09:24 AM     Lab Results   Component Value Date/Time    ALKPHOSPHAT 74 02/29/2008 09:24 AM    ASTSGOT 14 02/29/2008 09:24 AM    ALTSGPT 13 02/29/2008 09:24 AM    TBILIRUBIN 0.8 02/29/2008 09:24 AM        For this encounter I reviewed the following  (Cardiac surgery) notes, Catheterization and Echo     For this encounter I directly reviewed ECG tracings.  Sinus with left bundle branch block

## 2022-03-16 NOTE — PROGRESS NOTES
"Valve Program Consultation: 3/16/2022, TAVR date TBD, pending further workup    Mental Status Assessment:  Appearance: Normal  Behavior: Normal  Mood/Affect: Normal  Thought process/content: Normal  Cognition: Normal  Functional ability: Frail, uses wheelchair and walker  Dental Concerns: Upper dentures, patient denies any s/s of infection  Further mental assessment needed: No    Post-op Plan of Care:  Support systems: daughter Yvonne  Patient understands discharge plan: Yes  DME: wheelchair, walker, O2, hospital bed, shower chair  Home health warranted prior to procedure: Currently has [facility], to be transitioned to [facility]  Social concerns for discharge: No  PCP alerted of social concerns: N/A  POLST: none  Advance directive: will bring a copy  Flu/PNA/COVID vaccines completed: UTD  Post-op goal: \"get out and turn my roses\"  [place]  Medication instructions: Hold multivitamin x1 week prior d/t vitamin C     Concerns prior to procedure: 3-vessel CAD, needs staged PCI due to non-surgical candidate. Also needs JOHNSON to assess LVOT obstruction.     Met with patient during New TAVR consult.     All patient's questions and concerns were addressed during this visit. They understood pre-operative and post-operative plan of care.    Reviewed patient TAVR education packet explaining that information is provided regarding preparation for TAVR the night prior, what to expect during the hospital stay, average LOS, and what to look out for post TAVR discharge. Explained that patient will require SBE prophylactic antibiotic prior to any dental treatment post TAVR.     Explained that patient should not eat or drink anything past midnight the day of the procedure. Encouraged patient to wear something clean and comfortable, easy to get on/off to check in Monday morning, time TBD. Patient may have friends and family in the pre-operational area until patient is taken to the operating room, at which time family and friends " will be asked to wait on floor 1 [facility] surgical waiting area. On completion of TAVR, heart team will update family. Once update is given, there is some time before family/friends may visit patient in their assigned room. Length of stay on average is one night, however patient may stay longer depending on specific needs at that time. Patient given printed instructions sheet with all above stated instructions. Patient states understanding of all material and education presented today and has no further questions at this time. Encouraged patient again, to contact me with any questions or concerns during this work-up process. Patient states understanding.

## 2022-03-16 NOTE — TELEPHONE ENCOUNTER
Valve Program Functional Assessment: Pre-op TAVR    KCCQ12   1a) Showering/bathing: 3  1b) Walking 1 block on ground: 1  1c) Hurrying or joggin  2) Swellin  3) Fatigue: 1  4) Shortness of breath: 1  5) Sleep sitting up: 1  6) Limited enjoyment of life: 4  7) Spend the rest of your life with HF: 1  8a) Hobbies, recreational activities:1  8b) Working or doing household chores:1  8c) Visiting family or friends: 3    5 meter walk test  1) __6.79____ s/5m  2) __6.92____ s/5m  3) __6.86____ s/5m  AVG:_6.85______     Strength   1) _28_____ kg  2) _27_____ kg  3) _28_____ kg  AVG:__27.6____    [place] ADLs  Patient independently preforms...   - Bathing: Yes   - Dressing: Yes   - Toileting: Yes   - Transferring: Yes   - Continence: Yes   - Feeding: Yes   Total Score: _6_/6    Living Situation  Patient lives: with adult child or relative    Mobility Aids   Patient uses: walker, wheelchair      FRAILTY SCORE: __ 4

## 2022-03-17 NOTE — TELEPHONE ENCOUNTER
2nd attempt for CD of echo/cath images has been faxed to [facility]. Attempted to call medical records, went straight to voicemail and have not yet to receive a phone call back.     Confirmation fax scanned into [facility]

## 2022-03-17 NOTE — TELEPHONE ENCOUNTER
----- Message -----  From: Jane Gutierrez R.N.  Sent: 3/16/2022  11:54 AM PDT  To: Arnie Berg Ass't  Subject: [facility] echo/cath images                             Doesn't look like we've received these images yet. Can you please request STAT and say patient has procedure on 3/22 and need it prior? Thanks!

## 2022-03-18 NOTE — TELEPHONE ENCOUNTER
Patient is scheduled on 3-21-22 for a Pre TAVR C w/PCI with . Patient was told to hold lasix AM day of procedure and to check in at 6:30 for an 8:30 procedure. H&P was done on 3-16-22 by Dr. Acosta. Pre admit to call patient.

## 2022-03-18 NOTE — OR NURSING
Pt tested Covid (+) on 1/11/2022.   Hospitalized at [place] 2/17 for 3 days.  Did test Covid (-) at [facility] in Feb.   Pt put on nebulizer.   Pt still on 2.5L O2.  Pt symptoms resolved end of Feb.

## 2022-03-18 NOTE — TELEPHONE ENCOUNTER
Confirmed with Dr. Acosta yesterday 3/17/22 patient is ok to proceed with staged PCI as scheduled on 3/21/22.    Received VM yesterday from patient's daughter wanting to confirm procedure on Monday or not.    Called patient's daughter Yvonne this morning and LVM for her to return my call to discuss.

## 2022-03-18 NOTE — TELEPHONE ENCOUNTER
Patient's daughter called back and LVM.    Called Yvonne back, and able to confirm angiogram for Monday 3/21/22. Reviewed instructions including check in time, NPO after midnight, hold furosemide AM of. Yvonne verbalizes understanding.

## 2022-03-21 PROBLEM — N28.9 RENAL INSUFFICIENCY: Status: ACTIVE | Noted: 2022-01-01

## 2022-03-21 PROBLEM — Z98.890 STATUS POST CARDIAC CATHETERIZATION: Status: ACTIVE | Noted: 2022-01-01

## 2022-03-21 PROBLEM — D69.6 THROMBOCYTOPENIA (HCC): Status: ACTIVE | Noted: 2022-01-01

## 2022-03-21 PROBLEM — D64.9 ANEMIA: Status: ACTIVE | Noted: 2022-01-01

## 2022-03-21 PROBLEM — G89.29 CHRONIC BACK PAIN: Status: ACTIVE | Noted: 2022-01-01

## 2022-03-21 NOTE — PROCEDURES
Cardiac Catheterization Procedure Note    DATE: 3/21/2022    : Connor Todd MD    PROCEDURES PERFORMED:  1. Left heart catheterization  2. Coronary angiography  3. Temporary Impella CP left ventricular assist device placement and removal  4. Percutaneous coronary intervention to the proximal left circumflex coronary into the second obtuse marginal branch  5. Percutaneous coronary intervention to the left main coronary into the proximal left anterior descending coronary  6. Percutaneous coronary mention to the ostial to distal left main coronary artery  7. Intravascular ultrasound of the left circumflex coronary  8. Intravascular ultrasound of the left anterior descending coronary  9. Intravascular ultrasound of the left main coronary artery  10. Moderate conscious sedation     INDICATIONS:  The patient is an 86-year-old gentleman with known severe multivessel coronary artery disease including left main coronary artery disease referred for high percutaneous coronary intervention prior to possible TAVR. The procedure is considered high risk due to patient frailty, peripheral arterial disease, and severe aortic stenosis with need for multivessel PCI including unprotected PCI of the left main coronary artery.    CONSENT:  The complete alternatives, risks, and benefits of the procedure were explained to the patient, including the fact that this procedure was higher risk than usual and would require upfront mechanical support. Signed informed consent was obtained and placed in the chart prior to the procedure.  A timeout was performed prior to beginning the procedure.    MEDICATIONS:  1. Lidocaine  2. Fentanyl  3. Midazolam  4. Nitroglycerin  5. Verapamil  6. Heparin  7. Clopidogrel  8. Protamine    MODERATE CONSCIOUS SEDATION:  I personally supervised the administration of moderate conscious sedation by the nursing staff for 270 minutes.  Sedation start time 9:55 AM  Sedation end time 2:25 PM    CONTRAST:  "Omnipaque 100 cc    ACCESS:   1.   14-Japanese Impella sheath in the left femoral artery.  2.   4-Japanese Eldorado sheath in the left femoral vein.  3.   8-Japanese [place]orado sheath right femoral artery.    4.   7-Japanese Slender Glidesheath in the right radial artery.    ESTIMATED BLOOD LOSS: 250 cc    COMPLICATIONS: None    PROCEDURE IN DETAIL:  The patient was brought to the cardiac catheterization laboratory in the fasting state.  The skin over the right and left groins was prepped and draped in the usual sterile fashion. Lidocaine infiltration was used to anesthetize the tissue over the left femoral artery.  Using ultrasound guidance and the micropuncture technique, a 6-Japanese Eldorado sheath was inserted in the right femoral artery.  The sheath had to be advanced over an Angled Glidewire due to severe tortuosity in the left iliofemoral system.  Two 6-Japanese Perclose devices were then deployed in the preclosure technique.  A 4-Japanese Eldorado sheath was then inserted in the right femoral vein for central venous access.  The left femoral arteriotomy tract was then dilated with an 8-Japanese dilator and then a 14-Japanese Impella sheath was placed.    After obtaining access, a 6-Japanese JR-4 diagnostic catheter was advanced through a 7-Japanese Destination sheath using the single access technique into the aortic root and was used to engage the ostium of the right coronary artery.  One cineangiogram was obtained, which demonstrated no severe lesions in the vessel.  This catheter was then removed and we proceeded with Impella placement.    The JR4 catheter was then exchanged over a J-wire for a 6-Japanese AL-1 diagnostic catheter.  Attempts were made using a straight 0.035\" do into the left ventricular cavity, however, these were unsuccessful.  The AL-1 catheter was exchanged for a 6-Japanese AL-2 diagnostic catheter.  This time, the straight wire was able to be inserted into the left ventricular cavity and the AL-2 catheter was " "exchanged for a long 6-Iraqi pigtail catheter over an exchange wire.  The 0.018\" Impella wire was then advanced into the left ventricular cavity and the pigtail catheter was removed.  We then attempted to advance the Impella over the Impella wire, however, due to significant tortuosity in the iliofemoral system, the Impella device could not be advanced into the common iliac artery.  The Destination sheath, Impella device and Impella wire were then removed and the short Impella sheath was exchanged for a long Impella sheath.  After exchanging for a longer sheath, the straight wire was again advanced into the left ventricular cavity using the AL-2 catheter, wire was then exchanged for the pigtail catheter, which was then used to readvanced the Impella wire into the left ventricular cavity.  Again, however, the Impella device could not be easily advanced through the iliofemoral system.    We then accessed the right femoral artery using ultrasound guidance and the micropuncture technique.  A limited right iliofemoral angiogram was then performed using the micropuncture dilator, which demonstrated a heavily calcified and tortuous right iliofemoral system that was not appropriate for Impella placement.  Therefore, we turned our attention back to the left iliofemoral system.    We decided to remove the peel-away sheath and then with significant effort and frequent repositioning of the sheath and manipulation of the Impella, we were finally able to advance the Impella across the aortic valve.  Dr. Acosta scrubbed in and assisted with this portion of the case given the extreme difficulty we were having advancing the Impella.  After finally advancing the Impella across the aortic valve, it was turned on with the Auto setting and able to generate approximately 2.0-2.5 L/min of estimated cardiac output with good motor currents.    We then proceeded with intervention.  As the peel-away sheath had been removed, we could no longer " "proceed with a single access point.  Therefore, the right femoral artery was reaccessed using fluoroscopic guidance and the micropuncture technique.  We attempted to pass a 7-Slovenian Arrow sheath, a 7-Slovenian Destination sheath, and a 7-Slovenian long Warren sheath without success due to severe tortuosity in the right iliofemoral system.  We finally tried a 7-Slovenian short Warren sheath, however, a 7-Slovenian EBU-4.0 guide catheter could not be advanced through this sheath into the aortic root.  Therefore, the sheath was left in place and we proceeded with right radial arterial access.    The skin over the right radial artery was sterilely prepped and then anesthetized with lidocaine infiltration.  Using ultrasound and the micropuncture technique, a 7-Slovenian Slender Glidesheath was inserted in the right radial artery.  A 7-Slovenian EBU-4.0 guide catheter was then advanced into the aortic root and was then used to engage the ostium of the left main coronary artery.  Cineangiograms were obtained in multiple views, which redemonstrated severe lesions in the left circumflex, ramus intermedius, and left anterior descending coronary artery distributions.  Following initial cineangiography, we proceeded with intervention.    A 0.014\" [place] wire was advanced into the distal aspect of the second obtuse marginal branch and a 0.014\" BMW wire was advanced into the distal aspect of the first diagonal branch.  An [place] IVUS catheter was then advanced over the circumflex guidewire.  The distal second obtuse marginal branch was relatively healthy with a diameter of approximately 3.1 mm, the mid aspect of the OM2 had a diameter of approximately 3.7 mm, and the proximal circumflex was relatively healthy with a diameter of approximately 4.2 mm.  The lesions in the OM2 consisted of mainly soft plaque with minimal calcification.  The left main coronary artery had eccentric calcific plaque with a true vessel diameter that appeared to be " "approximately 4.8 mm.  Following intravascular ultrasound, we proceeded with percutaneous coronary intervention.    A 3.0 x 15 mm compliant balloon was then advanced over the circumflex guidewire and was used to pre-dilate the lesions up to a maximum pressure of 12 marry.  Following pre-dilation, a 3.0 x 22 mm Resolute [place] drug-eluting stent was advanced over the guidewire and was deployed across the mid OM2 lesion at a maximum pressure of 16 marry.  A 3.5 x 38 mm Resolute [place] drug-eluting stent was then deployed from the proximal left circumflex coronary artery into the OM2 overlapping the previously placed stent at a maximum pressure of 16 marry.  Following stent appointment, angiography demonstrated excellent stent expansion and, therefore, no post dilation was performed.  We then proceeded with intervention on the left anterior descending coronary artery.    Prior to intervening on the LAD, a 0.014\" Run-through wire was advanced into the ramus intermedius branch for protection.  At this time, we also noticed that a small hematoma appeared to be developing at the right groin site and the 7-Par[place] sheath was exchanged for an 8-[place]ge[place] sheath with improved hemostasis.  The 3.0 x 15 mm compliant balloon was then advanced over the LAD wire and was used to predilate the ostial to proximal aspect of the vessel twice up to a maximum pressure of 10 marry.  Following predilation, a 3.0 x 30 mm Resolute [place] drug-eluting stent was advanced over the guidewire and was deployed across the distal left main coronary artery into the proximal left anterior descending coronary artery at a maximum pressure of 18 marry.  The stent was then postdilated with a 3.5 x 15 mm noncompliant balloon up to a maximum pressure of 20 marry.  Following stent placement, the IVUS catheter was advanced over the LAD wire and was used to evaluate the outcome.  This demonstrated reasonable expansion of the stent with a focal area of mild " underexpansion that still managed to maintain a minimal area of 6.5 mm².  The IVUS catheter was removed and this area as well as the ostial LAD was then further postdilated with a 4.0 x 8 mm noncompliant balloon twice up to a maximum pressure of 14 marry.  The balloon was then pulled back into the left main coronary and was used to perform proximal optimization of the stent at a maximum pressure of 14 marry.  Following intervention on the LAD, we then proceeded with intervention on the left main coronary artery.    A 4.0 x 8 mm Resolute [place] drug-eluting stent was then advanced over the LAD wire and was deployed across the ostial to mid left main coronary artery overlapping the previously placed LAD stent at a maximum pressure of 14 marry.  Of note, just as the left main stent was being deployed, the patient took a deep breath and the stent moved in a few millimeters from the intended landing spot.  Following stent deployment, the left main stent and LAD stents were reinterrogated with the IVUS catheter.  This demonstrated improved expansion of the LAD stent.  The stent did appear to land just short of the true ostium at the superior margin of the vessel.  A 4.5 x 8 mm noncompliant balloon was then used to perform proximal optimization on the left main stent at a maximum pressure of 14 marry.  Balloon was then pulled back and used to post dilate the proximal aspect of the stent up to a maximum pressure of 14 marry.  The IVUS catheter was then reinserted and was used to evaluate the left main stent.  This demonstrated good expansion with a distal minimal luminal area of approximately 10.0 mm² and a proximal luminal area of approximately 13.0 mm².  Cineangiograms did demonstrate slight pinching of the LAD ostium and, therefore, this area was further postdilated up to a maximum pressure with the 4.0 x 8 mm noncompliant went up to maximum pressure of 24 marry, which resulted in improved yield.  One final cineangiogram was then  "obtained, which demonstrated good stent expansion with no evidence of wire perforation, thrombus, or dissection in any of the vessels.  The Impella was then weaned down to P2 with stable hemodynamics.  We then proceeded with obtaining hemostasis.    Limited right iliofemoral angiography was performed to evaluate for bleeding from the right femoral arteriotomy as the area did appear to have developed a small hematoma.  There did not appear to be active bleeding and the catheter was adequately above the bifurcation to proceed with closure using an 8-Egyptian Angio-Seal device.  Unfortunately, the Angio-Seal device failed, most likely due to severe calcification of the vessel, and manual pressure was applied.  With Dr. Acosta's assistance, we then removed the Impella sheath and device after turning off the device and using the rewiring port to advance a 0.35\" J-wire.  After removing the device, the preclosure devices were tied with some slight residual leakage from the arteriotomy and, therefore, a 6-Egyptian Angio-Seal device was also deployed resulting in excellent hemostasis.  At this point, manual pressure had not resolved the right femoral artery bleeding and therefore 50 mg of protamine was given.  Shortly after giving protamine, the ACT had improved to 160 and with approximately 30 more minutes of manual pressure, hemostasis was achieved.  A FemoStop device was then placed.  A TR band was then placed over the right radial artery site with good hemostasis.    HEMODYNAMICS:   1. Aortic pressure: 116/43 mmHg  2. Pre A-wave pressure: 15 mmHg  3. No significant aortic gradient on pullback    CORONARY ANGIOGRAPHY:  1. The left main coronary artery has diffuse 50% disease and trifurcates into the left anterior descending, ramus intermedius, and left circumflex coronary arteries.  2. The left anterior descending coronary artery has ostial 80% disease and proximal 70% disease at the first septal .  The mid vessel is " then occluded and refills by robust collaterals from the right coronary artery distribution.  It supplies a large first diagonal branch with diffuse mild to moderate disease.  3. The left circumflex coronary artery is a large, nondominant vessel with proximal 40% disease followed by a relatively small AV groove vessel.  It supplies a small first obtuse marginal branch and a large bifurcating second obtuse marginal branch that has ostial 80% disease in mid 90% disease.  4. The right coronary artery is a large, dominant vessel with diffuse luminal irregularities with more focal mid 50% disease.  It supplies a moderate posterolateral branch and a large posterior descending coronary artery with luminal irregularities.  The posterior descending coronary supplies robust collaterals to the distal left anterior descending coronary.    INTRAVASCULAR ULTRASOUND:  See IVUS findings and procedure details above.    PERCUTANEOUS CORONARY INTERVENTION:  Lesion #1  1.   Lesion location: Ostial second obtuse marginal branch  2.   Lesion type: A  3.   Lesion length: 10  4.   Pre-intervention BONILLA flow: 3  5.   Post-intervention BONILLA flow: 3  6.   Pre-intervention stenosis: 80%  7.   Post-intervention stenosis: 0%  8.   Stent: 3.5 x 38 mm Resolute [place] drug-eluting stent    Lesion #2  1.   Lesion location: Mid second obtuse marginal branch  2.   Lesion type: A  3.   Lesion length: 10  4.   Pre-intervention BONILLA flow: 3  5.   Post-intervention BONILLA flow: 3  11. Pre-intervention stenosis: 90%  12. Post-intervention stenosis: 0%  13. Stent: 3.0 x 22 mm Resolute [place] drug-eluting stent    Lesion #3  1.   Lesion location: Ostial to proximal left anterior descending coronary artery  2.   Lesion type: B  3.   Lesion length: 20 mm  4.   Pre-intervention BONILLA flow: 3  5.   Post-intervention BONILLA flow: 3  6.   Pre-intervention stenosis: 80%  7.   Post-intervention stenosis: 10%  8.   Stent: 3.5 x 26 mm Resolute [place] drug-eluting stent    Lesion  #4  1. Lesion location: Ostial to distal left main coronary artery  2. Lesion type: C  3. Lesion length: 10  4. Pre-intervention BONILLA flow: 3  5. Post-intervention BONILLA flow: 3  6. Pre-intervention stenosis: 50%  7. Post-intervention stenosis: 10%  8. Stent: 4.0 x 8 mm Resolute [place] drug-eluting stent    IMPRESSION:  1. Severe obstructive two-vessel coronary artery disease.  2. Successful percutaneous coronary intervention to the proximal left circumflex coronary artery into the second obtuse marginal branch with overlapping 3.5 x 38 mm and 3.0 x 22 mm Resolute [place] drug-eluting stents.  3. Successful percutaneous coronary intervention to the left main coronary artery into the proximal left anterior descending coronary artery with one 3.5 x 26 mm  Resolute Arcenio drug-eluting stents.  4. Successful percutaneous coronary intervention to the ostial left main coronary artery with one 4.0 x 8 mm Resolute [place] drug-eluting stent.    RECOMMENDATIONS:  1. Observation overnight in IMCU  2. TR band release per protocol  3. Femstop for 1 hour  4. Flat for 6 hours  5. DAPT with aspirin and clopidogrel  6. Continue TAVR evaluation    NOTIFICATION:  The patient's family was called and notified of the results of his cardiac catheterization.

## 2022-03-21 NOTE — TELEPHONE ENCOUNTER
Received phone call from patient's daughter Yvonne stating patient needs refills for metoprolol, furosemide, and K. Advised they should fill for patient upon DC from angiogram, but if not to let us know. Reassured her we will be able to refill the medications when needed.

## 2022-03-22 PROBLEM — I73.9 PAD (PERIPHERAL ARTERY DISEASE) (HCC): Status: ACTIVE | Noted: 2022-01-01

## 2022-03-22 PROBLEM — N40.0 ENLARGED PROSTATE: Status: ACTIVE | Noted: 2022-01-01

## 2022-03-22 PROBLEM — N20.0 RENAL CALCULUS: Status: ACTIVE | Noted: 2022-01-01

## 2022-03-22 PROBLEM — J92.9 PLEURAL PLAQUE: Status: ACTIVE | Noted: 2022-01-01

## 2022-03-22 NOTE — DISCHARGE PLANNING
Meds-to-Beds: Discharge prescription orders listed below delivered to patient's bedside. RN Mavis notified. Patient counseled and requested daughter Yvonne to be called to review medications. Outbound call to Yvonne - counseled on medications. Patient elected to have co-payment billed to patient account.       Current Outpatient Medications   Medication Sig Dispense Refill   • furosemide (LASIX) 40 MG Tab Take 0.5 Tablets by mouth every day for 30 days. 30 Tablet 2   • atorvastatin (LIPITOR) 40 MG Tab Take 1 Tablet by mouth every day. 30 Tablet 11   • [START ON 3/23/2022] clopidogrel (PLAVIX) 75 MG Tab Take 1 Tablet by mouth every day. 30 Tablet 11      Dia Lemus, PharmD

## 2022-03-22 NOTE — PROGRESS NOTES
TR band pressure released without bleeding starting at 16:15.  All pressure released over next hour without bleeding.    Fem stop pressure released as well, starting at 16:20.  Starting pressure 114, currently down to 38 without bleeding or issue.  Leg around insertion site soft without hematoma; no bleeding.

## 2022-03-22 NOTE — ASSESSMENT & PLAN NOTE
Cardiology consulting  3/21 Cath required impella.  Monitor on telemetry  Home Health reordered 3/22  Medical management.

## 2022-03-22 NOTE — FACE TO FACE
Face to Face Supporting Documentation - Home Health    The encounter with this patient was in whole or in part the primary reason for home health admission.    Date of encounter:   Patient:                    MRN:                       YOB: 2021  Delonte Alexander  [id number]  1935     Home health to see patient for:  Skilled Nursing care for assessment, interventions & education, Physical Therapy evaluation and treatment and Occupational therapy evaluation and treatment    Skilled need for:  Recent Deterioration of Health Status weakness after cardiac catheterization    Skilled nursing interventions to include:  Comment: home check with attention to right wrist and groin arterial puncture sites.    Homebound status evidenced by:  Needs the assistance of another person in order to leave the home. Leaving home requires a considerable and taxing effort. There is a normal inability to leave the home.    Community Physician to provide follow up care: Nicole Tse P.A.-C.     Optional Interventions? No      I certify the face to face encounter for this home health care referral meets the CMS requirements and the encounter/clinical assessment with the patient was, in whole, or in part, for the medical condition(s) listed above, which is the primary reason for home health care. Based on my clinical findings: the service(s) are medically necessary, support the need for home health care, and the homebound criteria are met.  I certify that this patient has had a face to face encounter by myself.  Maurizio Urena D.O. - WILLIAMI: [phone]

## 2022-03-22 NOTE — CARE PLAN
The patient has no pain at this time    Shift Goals  Clinical Goals: hemodynamic stability  Patient Goals: back pain control    Progress made toward(s) clinical / shift goals:  yes    Patient is not progressing towards the following goals:      Problem: Fall Risk  Goal: Patient will remain free from falls  Outcome: Progressing     Problem: Knowledge Deficit - Standard  Goal: Patient and family/care givers will demonstrate understanding of plan of care, disease process/condition, diagnostic tests and medications  Outcome: Progressing     Problem: Pain - Standard  Goal: Alleviation of pain or a reduction in pain to the patient’s comfort goal  Outcome: Progressing

## 2022-03-22 NOTE — PROGRESS NOTES
"Hospital Medicine Daily Progress Note    Date of Service  3/22/2022    Chief Complaint  Scheduled heart cath for known multivessel CAD.    Hospital Course  Mister Martel \"Roberto\" Catherine is a very pleasant 86 y.o. male with a history of known multivessel coronary artery disease, severe aortic stenosis, hypertension, chronic O2 use, ascending aortic aneurysm 4.5cm, and 1/2022 COVID infection who presented 3/21/2022 with a scheduled cardiac catheterization in [facility] for a future outpatient TAVR.  On 3/21 he had a left heart cath with need for temporary impella and had placement of 3stents to proximal left circumflex and left main coronary artery and left main.  He did have some post procedure bleeding and a Femstop was placed.         Interval Problem Update  3/22: Sitting up in bedside chair.  No complaints.  No significant pain at arterial procedure sites.  Bruising at right wrist arterial puncture site but no swelling nor bleeding noted.  Patient states he feels good going home.  States home health has been useful and that his daughter is helping too.  No chest pain.  Aware he now needs to follow up for future TAVR.     I have personally seen and examined the patient at bedside. I discussed the plan of care with patient, bedside RN and .    Consultants/Specialty  cardiology    Code Status  Full Code    Disposition  Patient is medically cleared for discharge.   Anticipate discharge to to home with organized home healthcare and close outpatient follow-up.  I have placed the appropriate orders for post-discharge needs.    Review of Systems  Review of Systems   Constitutional: Negative for chills and fever.   Eyes: Negative for discharge.   Respiratory: Negative for cough, shortness of breath and stridor.    Cardiovascular: Negative for chest pain, palpitations and leg swelling.   Gastrointestinal: Negative for abdominal pain and nausea.   Genitourinary: Negative for dysuria and hematuria.   Musculoskeletal: " Negative for back pain and joint pain.   Neurological: Negative for dizziness, speech change and headaches.   Psychiatric/Behavioral: The patient is not nervous/anxious.         Physical Exam  Temp:  [36 °C (96.8 °F)-36.1 °C (97 °F)] 36.1 °C (96.9 °F)  Pulse:  [] 54  Resp:  [0-29] 21  BP: ()/(27-63) 103/27  SpO2:  [63 %-100 %] 99 %    Physical Exam  Vitals reviewed.   Constitutional:       Appearance: Normal appearance. He is not diaphoretic.   HENT:      Head: Normocephalic and atraumatic.      Nose: Nose normal.      Mouth/Throat:      Mouth: Mucous membranes are moist.      Pharynx: No oropharyngeal exudate.   Eyes:      General: No scleral icterus.        Right eye: No discharge.         Left eye: No discharge.      Extraocular Movements: Extraocular movements intact.      Conjunctiva/sclera: Conjunctivae normal.   Cardiovascular:      Rate and Rhythm: Normal rate and regular rhythm.      Pulses:           Radial pulses are 2+ on the right side and 2+ on the left side.        Dorsalis pedis pulses are 2+ on the right side and 2+ on the left side.      Heart sounds: Murmur heard.   Pulmonary:      Effort: Pulmonary effort is normal. No respiratory distress.      Breath sounds: Normal breath sounds. No wheezing or rales.   Abdominal:      General: Bowel sounds are normal. There is no distension.      Palpations: Abdomen is soft.      Tenderness: There is no abdominal tenderness.   Musculoskeletal:         General: No swelling or tenderness.      Cervical back: Neck supple. No tenderness. No muscular tenderness.      Right lower leg: No edema.      Left lower leg: No edema.   Lymphadenopathy:      Cervical: No cervical adenopathy.   Skin:     Coloration: Skin is not jaundiced or pale.      Findings: Bruising (right groin and right wrist) present.   Neurological:      General: No focal deficit present.      Mental Status: He is alert and oriented to person, place, and time. Mental status is at baseline.       Cranial Nerves: No cranial nerve deficit.   Psychiatric:         Mood and Affect: Mood normal.         Behavior: Behavior normal.         Fluids    Intake/Output Summary (Last 24 hours) at 3/22/2022 1230  Last data filed at 3/22/2022 0800  Gross per 24 hour   Intake 150 ml   Output 790 ml   Net -640 ml       Laboratory  Recent Labs     03/21/22  0700 03/22/22  0340   WBC 7.4 8.3   RBC 3.47* 2.86*   HEMOGLOBIN 10.1* 8.1*   HEMATOCRIT 32.5* 27.2*   MCV 93.7 95.1   MCH 29.1 28.3   MCHC 31.1* 29.8*   RDW 50.7* 50.4*   PLATELETCT 96* 85*   MPV 14.3*  --      Recent Labs     03/21/22  0700 03/22/22  0340   SODIUM 136 137   POTASSIUM 4.5 5.0   CHLORIDE 98 104   CO2 27 23   GLUCOSE 95 94   BUN 43* 40*   CREATININE 1.67* 1.60*   CALCIUM 9.3 8.7     Recent Labs     03/21/22  0700   APTT 37.4*   INR 1.21*               Imaging  CL-PRE-TRANSCATHETER AORTIC VALVE REPLACEMENT    (Results Pending)        Assessment/Plan  * Status post cardiac catheterization- (present on admission)  Assessment & Plan  Cardiology consulting  3/21 Cath required impella.  Monitor on telemetry  Home Health reordered 3/22  Medical management.    Renal insufficiency  Assessment & Plan  3/22 BUN:40, Cr:1.60  3/21 BUN:43, Cr:1.67  Monitor bmp  Hx of AS and received contrast.    Anemia  Assessment & Plan  3/21 Hgb:10.1  Monitor cbc    Thrombocytopenia (HCC)  Assessment & Plan  3/22 Plt:85  3/21 Plt:96  Monitor cbc    Macular degeneration- (present on admission)  Assessment & Plan  Per hx.    Chronic back pain  Assessment & Plan  Mobilize as able.  Acetaminophen.    Nonrheumatic aortic valve stenosis- (present on admission)  Assessment & Plan  Cardiology aware and in hopes of future outpatient TAVR    LBBB (left bundle branch block)- (present on admission)  Assessment & Plan  Monitor on telemetry  LBBB noted on EKG    Dyslipidemia- (present on admission)  Assessment & Plan  Statin therapy       VTE prophylaxis: SCDs/TEDs    I have performed a physical  exam and reviewed and updated ROS and Plan today (3/22/2022). In review of yesterday's note (3/21/2022), there are no changes except as documented above.

## 2022-03-22 NOTE — DISCHARGE SUMMARY
CHIEF COMPLAINT ON ADMISSION  Post Elective Cardiac Catheterization for pre TAVR     CODE STATUS  Full    HPI & HOSPITAL COURSE  This is a 87yo male here for elective cardiac catheterization prior to planned TAVR procedure. Briefly, the patient presented to the office with symptoms of dyspnea. Significant medical history of thoracic aortic aneurysm, coronary artery disease with known left main and three vessel CAD. After discussion with patient and family members, he was referred to  for elective cardiac catheterization with temporary Impella CP placement.  The procedure lasted for 270 minutes with use of 100 cc Omnipaque.  The left femoral artery was used for Impella placement.  After Impella placement the interventionalist proceeded with percutaneous coronary intervention to the proximal left coronary artery into the second OM, to the left main into the proximal LAD, and to the ostial to distal left main coronary artery with 4 Resolute [place] drug-eluting stents. Please see Procedural Note from Dr. Todd dated 3/21/22 for details. The Impella was removed. This was complicated by bleeding from the right femoral arterial site which was treated with manual pressure and 50 mg of protamine.  Approximately 30 minutes after this, hemostasis was achieved and a FemStop was placed.      The patient was transferred to the intermediate care unit and monitored closely.  The FemStop and radial band were successfully deflated with no further bleeding in the right femoral or right radial area.  The patient had no arrhythmias overnight.    The following morning patient's access sites remained soft without any bleeding or pain.  He had excellent circulation and sensation in lower extremities and right hand.  He was ambulated in the morning and sites were monitored following this.  There is no further bleeding on assessment.   his hemoglobin had an expected drop from 10.1-8.1.  This was rechecked 10 hours later and remained stable at  8.1.     The new medications including aspirin and Plavix were discussed in detail with Delonte and his daughter Yvonne.  They understand the importance of medication compliance.  A 30-day supply of the Plavix was brought to the bedside prior to discharge.  The patient was discharged home with his family member and home health (RN and PT). A 48 holter monitor was arranged to be placed at discharge to monitor for heart block prior to TAVR. He will follow up with the TAVR team as directed.       FOLLOW UP  Future Appointments   Date Time Provider Department Center   3/28/2022 10:15 AM [facility] 4 [facility] None   3/31/2022 10:00 AM [facility] EXAM 3 J[facility]ON [facility]   8/2/2022 10:00 AM Jamee Christine M.D. [facility]         MEDICATIONS ON DISCHARGE  Aspirin 81 mg daily  Plavix 75 mg daily  Furosemide 20 mg daily (decreased from 40 mg due to intermittent hypotension and severe AS)  Potassium 10 M EQ  Atorvastatin 40 mg daily  May continue the rest of his noncardiac medications  Discontinue: Metoprolol SR    PROCEDURES  LEFT CARDIAC CATH on 3/21/22  POST-OPERATIVE DIAGNOSES:   IMPRESSION:  1. Severe obstructive two-vessel coronary artery disease.  2. Successful percutaneous coronary intervention to the proximal left circumflex coronary artery into the second obtuse marginal branch with overlapping 3.5 x 38 mm and 3.0 x 22 mm Resolute [place] drug-eluting stents.  3. Successful percutaneous coronary intervention to the left main coronary artery into the proximal left anterior descending coronary artery with one 3.5 x 26 mm  Resolute [place] drug-eluting stents.  4. Successful percutaneous coronary intervention to the ostial left main coronary artery with one 4.0 x 8 mm Resolute [place] drug-eluting stent.     RECOMMENDATIONS:  1. Observation overnight in IMCU  2. TR band release per protocol  3. Femstop for 1 hour  4. Flat for 6 hours  5. DAPT with aspirin and clopidogrel  6. Continue TAVR evaluation

## 2022-03-22 NOTE — PROGRESS NOTES
Patient seen after cath. Fem stop on right leg with warm lower leg and palpable PT pulse, no bleeding at right femoral site. Skin is soft surrounding the area.   Percutaneous closure of left femoral site with no bleeding or swelling.   TR band over right radial site, no swelling in wrist.     Delonte is feeling well, no chest pain or shortness of breath.   Continue slow withdraw of fem stop and TR band.   Monitor patient overnight.   Aspirin 81mg and plavix 75mg 3/22AM along with home meds.     Please contact Cardiology on call for any questions.

## 2022-03-22 NOTE — DISCHARGE PLANNING
Received Choice form at 1320  Agency/Facility Name: [facility] (resume)  Referral sent per Choice form at 1321    1405- Spoke To: Eveline  Agency/Facility Name: [facility]  Plan or Request: DPA called to f/u, was told , Sabrina will call this DPA back.    3235- per Sabrina at [place], approved for resumption.

## 2022-03-22 NOTE — DISCHARGE INSTRUCTIONS
Discharge Instructions    Discharged to home by wheelchair with daughterYvonne.   Be sure to schedule a follow-up appointment with your primary care doctor or any specialists as instructed.     Discharge Plan:   Diet Plan: Discussed  Activity Level: Discussed  Confirmed Follow up Appointment: Appointment Scheduled  Confirmed Symptoms Management: Discussed  Medication Reconciliation Updated: Yes    I understand that a diet low in cholesterol, fat, and sodium is recommended for good health. Unless I have been given specific instructions below for another diet, I accept this instruction as my diet prescription.   Other diet: low sodium, heart healthy diet.       · Is patient discharged on Warfarin / Coumadin? NO      Depression / Suicide Risk    As you are discharged from this [facility] facility, it is important to learn how to keep safe from harming yourself.    Recognize the warning signs:  · Abrupt changes in personality, positive or negative- including increase in energy   · Giving away possessions  · Change in eating patterns- significant weight changes-  positive or negative  · Change in sleeping patterns- unable to sleep or sleeping all the time   · Unwillingness or inability to communicate  · Depression  · Unusual sadness, discouragement and loneliness  · Talk of wanting to die  · Neglect of personal appearance   · Rebelliousness- reckless behavior  · Withdrawal from people/activities they love  · Confusion- inability to concentrate     If you or a loved one observes any of these behaviors or has concerns about self-harm, here's what you can do:  · Talk about it- your feelings and reasons for harming yourself  · Remove any means that you might use to hurt yourself (examples: pills, rope, extension cords, firearm)  · Get professional help from the community (Mental Health, Substance Abuse, psychological counseling)  · Do not be alone:Call your Safe Contact- someone whom you trust who will be there for  "you.  · Call your local CRISIS HOTLINE 303-4804 or 703-887-6490  · Call your local Children's Mobile Crisis Response Team [place] (862) 953-9271 or www.TalkApolis  · Call the toll free National Suicide Prevention Hotlines   · National Suicide Prevention Lifeline 005-934-QJXV (6734)  · [facility] Line Network 800-SUICIDE (852-1141)      Post Angiogram Groin Care Instructions     INSTRUCTIONS  2. Examine (look and feel) the site of your incision site TODAY so you can recognize changes that should be called to your doctor (see below).  3. Avoid straining either by lifting or pulling objects for 4-5 days. Avoid lifting over 5 pounds.   4. For at least 72 hours, if you should sneeze or cough, please hold pressure over your groin area.  5. If you should begin to have oozing from the catheterization site, please hold firm pressure and call your doctor's office immediately.  6. If profuse bleeding occurs from the catheterization site, hold firm pressure and call \"[phone]\" immediately for assistance.  7. Remove bandage after 24 hours.     ACTIVITY  2. Limit activity as instructed by your doctor.  3. No driving or very limited driving with frequent stops for one week.   4. If you must take a long car ride, stop every hour and walk around the car.   5. Warm showers or baths are permitted after the bandage is removed. Avoid hot showers, baths, hot tubs, and swimming for one week.    PLEASE CALL YOUR DOCTOR IF:  2. Temperature elevation occurs.  3. Catheterization site becomes reddened or begins to drain.   4. Bruising appears to be new or not resolving. The bruise may move down your leg. This is normal.  5. The small round lump in the groin increases in size.  6. Any leg numbness, aching, or discomfort (immediately).  7. Increasing discomfort in the leg at the insertion site.  8. Chest pains, even if relieved by Nitroglycerin.    MISCELLANEOUS INSTRUCTIONS  1. Bruising may occur as a result of heart catheterization. " Some of the discoloration may travel down the leg, going from blue to green in color.  2. A small round lump under the catheterization site will remain for up to six weeks.  3. If any questions arise call your physician's office. The Contact Center is open Monday through Friday 7AM to 5PM and may speak to a nurse at [phone], or toll free at [phone].   4. You should call 911 if you develop problems with breathing or chest pain.    FOR PROBLEMS CALL DR: *** AT: ***    I acknowledge receipt and understanding of these Home Care instructions.

## 2022-03-22 NOTE — PROGRESS NOTES
48 hour Holter monitor placed, per Casey Acosta M.D.  >In clinic hook up, monitor serial #[id number].

## 2022-03-22 NOTE — PROGRESS NOTES
Pt discharge home with daughter Yvonne, in wheelchair with Home O2.  VSS and no pain at time of d/c.  Groin sights assessed with daughter and education about site care and assessment given.

## 2022-03-22 NOTE — PROGRESS NOTES
4 Eyes Skin Assessment Completed by GRAHAM Freemna and GRAHAM Cotton.    Head WDL  Ears WDL  Nose WDL  Mouth WDL  Neck WDL  Breast/Chest WDL  Shoulder Blades WDL  Spine WDL  (R) Arm/Elbow/Hand Redness and Blanching  (L) Arm/Elbow/Hand Redness and Blanching  Abdomen WDL  Groin Incision  Scrotum/Coccyx/Buttocks Redness and Blanching  (R) Leg Incision  (L) Leg Incision  (R) Heel/Foot/Toe Redness  (L) Heel/Foot/Toe Redness and Blanching          Devices In Places Blood Pressure Cuff, Pulse Ox and Nasal Cannula      Interventions In Place Elbow Mepilex    Possible Skin Injury No    Pictures Uploaded Into Epic N/A  Wound Consult Placed N/A  RN Wound Prevention Protocol Ordered No

## 2022-03-22 NOTE — CONSULTS
"Hospital Medicine Consultation    Date of Service  3/21/2022    Referring Physician  Connor Todd M.D.    Consulting Physician  Maurizio Urena D.O.    Reason for Consultation  Medical care after scheduled cardiac catheterization    History of Presenting Illness  Mister Martel \"Roberto\" Catherine is a very pleasant 86 y.o. male with a history of known multivessel coronary artery disease, severe aortic stenosis, hypertension, chronic O2 use, ascending aortic aneurysm 4.5cm, and 1/2022 COVID infection who presented 3/21/2022 with a scheduled cardiac catheterization in [facility] for a future outpatient TAVR.  On 3/21 he had a left heart cath with need for temporary impella and had placement of 3stents to proximal left circumflex and left main coronary artery and left main.  He did have some post procedure bleeding and a Femstop was placed.      The patient is alert and very thankful.  He claims his chronic back hurts him from \"being moved around.\"  He is A+Ox3 with clear speech.      Review of Systems  Review of Systems   Constitutional: Negative for chills and weight loss.   HENT: Negative for congestion.    Eyes: Negative for discharge.   Respiratory: Negative for cough, shortness of breath and stridor.    Cardiovascular: Negative for chest pain, palpitations and leg swelling.   Gastrointestinal: Negative for abdominal pain, diarrhea and nausea.   Genitourinary: Negative for dysuria and hematuria.   Musculoskeletal: Positive for back pain. Negative for joint pain.   Neurological: Negative for dizziness, sensory change and speech change.   Psychiatric/Behavioral: The patient is not nervous/anxious.        Past Medical History   has a past medical history of Breath shortness, CHF (congestive heart failure) (HCC), Dental disorder, Heart murmur, High cholesterol, Hyperlipidemia, and Hypertension.    Surgical History   has a past surgical history that includes hernia repair.    Family History  family history includes Heart " Disease in his father and sister; No Known Problems in his mother and sister.    Social History   reports that he quit smoking about 35 years ago. His smoking use included cigarettes. He has a 50.00 pack-year smoking history. He has never used smokeless tobacco. He reports previous alcohol use. He reports that he does not use drugs.  Recently .  Has children.      Medications  Current Facility-Administered Medications   Medication Dose Route Frequency Provider Last Rate Last Admin   • aspirin EC (ECOTRIN) tablet 81 mg  81 mg Oral DAILY Connor Todd M.D.   81 mg at 03/21/22 1714   • [START ON 3/22/2022] clopidogrel (PLAVIX) tablet 75 mg  75 mg Oral DAILY Connor Todd M.D.       • atorvastatin (LIPITOR) tablet 40 mg  40 mg Oral Q EVENING JACKSON Mccray-CHali   40 mg at 03/21/22 1714   • benzonatate (TESSALON) capsule 200 mg  200 mg Oral TID PRN TRISTA Mccray.-C.       • ipratropium-albuterol (DUONEB) nebulizer solution  3 mL Nebulization BID (RT) TRISTA Mccray.-C.       • [START ON 3/22/2022] metoprolol SR (TOPROL XL) tablet 25 mg  25 mg Oral Q EVENING TRISTA Mccray.-C.       • traZODone (DESYREL) tablet 50 mg  50 mg Oral QDAY PRN ALINA Mccray.A.-C.       • umeclidinium-vilanterol (ANORO ELLIPTA) inhaler 1 Puff  1 Puff Inhalation QDAILY (RT) TRISTA Mccray.-C.        And   • fluticasone (FLOVENT HFA) 44 MCG/ACT inhaler 88 mcg  2 Puff Inhalation QDAILY (RT) TRISTA Mccray.-C.           Allergies  No Known Allergies    Physical Exam  Temp:  [36 °C (96.8 °F)-36.1 °C (97 °F)] 36 °C (96.8 °F)  Pulse:  [] 57  Resp:  [16-29] 23  BP: ()/(57-59) 88/59  SpO2:  [63 %-97 %] 95 %    Physical Exam  Vitals reviewed.   Constitutional:       Appearance: Normal appearance. He is not diaphoretic.   HENT:      Head: Normocephalic and atraumatic.      Nose: Nose normal.      Mouth/Throat:      Mouth: Mucous membranes are moist.      Pharynx: No  oropharyngeal exudate.   Eyes:      General: No scleral icterus.        Right eye: No discharge.         Left eye: No discharge.      Extraocular Movements: Extraocular movements intact.      Conjunctiva/sclera: Conjunctivae normal.   Cardiovascular:      Rate and Rhythm: Normal rate and regular rhythm.      Pulses:           Radial pulses are 2+ on the right side and 2+ on the left side.        Dorsalis pedis pulses are 0 on the right side and 1+ on the left side.        Posterior tibial pulses are 1+ on the right side.      Heart sounds: No murmur heard.  Pulmonary:      Effort: Pulmonary effort is normal. No respiratory distress.      Breath sounds: Normal breath sounds. No wheezing or rales.   Abdominal:      General: Bowel sounds are normal. There is no distension.      Palpations: Abdomen is soft.   Musculoskeletal:         General: No swelling or tenderness.      Cervical back: No tenderness. No muscular tenderness.      Right lower leg: No edema.      Left lower leg: No edema.   Lymphadenopathy:      Cervical: No cervical adenopathy.   Skin:     Coloration: Skin is not jaundiced or pale.   Neurological:      General: No focal deficit present.      Mental Status: He is alert and oriented to person, place, and time. Mental status is at baseline.      Cranial Nerves: No cranial nerve deficit.   Psychiatric:         Mood and Affect: Mood normal.         Behavior: Behavior normal.         Fluids      Laboratory  Recent Labs     03/21/22  0700   WBC 7.4   RBC 3.47*   HEMOGLOBIN 10.1*   HEMATOCRIT 32.5*   MCV 93.7   MCH 29.1   MCHC 31.1*   RDW 50.7*   PLATELETCT 96*   MPV 14.3*     Recent Labs     03/21/22  0700   SODIUM 136   POTASSIUM 4.5   CHLORIDE 98   CO2 27   GLUCOSE 95   BUN 43*   CREATININE 1.67*   CALCIUM 9.3     Recent Labs     03/21/22  0700   APTT 37.4*   INR 1.21*                 Imaging  CL-PRE-TRANSCATHETER AORTIC VALVE REPLACEMENT    (Results Pending)       Assessment/Plan  * Status post cardiac  catheterization- (present on admission)  Assessment & Plan  Cardiology consulting  3/21 Cath required impella.  Monitor on telemetry  Medical management.    Renal insufficiency  Assessment & Plan  3/21 BUN:43, Cr:1.67  Monitor bmp  Hx of AS and received contrast.    Anemia  Assessment & Plan  3/21 Hgb:10.1  Monitor cbc    Thrombocytopenia (HCC)  Assessment & Plan  3/21 Plt:96  Monitor cbc    Macular degeneration- (present on admission)  Assessment & Plan  Per hx.    Chronic back pain  Assessment & Plan  Add lidoderm patch  Mobilize as able.  Acetaminophen.    Nonrheumatic aortic valve stenosis- (present on admission)  Assessment & Plan  Cardiology aware and in hopes of future outpatient TAVR    LBBB (left bundle branch block)- (present on admission)  Assessment & Plan  Monitor on telemetry  LBBB noted on EKG    Dyslipidemia- (present on admission)  Assessment & Plan  Statin therapy

## 2022-03-23 PROBLEM — I25.10 CORONARY ARTERY DISEASE INVOLVING NATIVE CORONARY ARTERY OF NATIVE HEART WITHOUT ANGINA PECTORIS: Status: ACTIVE | Noted: 2022-01-01

## 2022-03-23 PROBLEM — Z98.890 STATUS POST CARDIAC CATHETERIZATION: Status: RESOLVED | Noted: 2022-01-01 | Resolved: 2022-01-01

## 2022-03-23 NOTE — TELEPHONE ENCOUNTER
Discussed with Dr. Acosta, ok for rehook via telephone.    Spoke with Nicolette who said she was going to contact patient's daughter to instruct rehook over the phone.    Called patient's daughter Yvonne and confirmed patient is rehooked. She also was wondering if patient should still be on KCl, if so, they need a refill. She states patient's furosemide was decreased to 20mg QD.

## 2022-03-23 NOTE — PATIENT INSTRUCTIONS
Increase furosemide to 40 mg QD  -no potassium at this time    STAT labs today.    Check daily vitals    Call for concerning symptoms    Compression stockings to legs daily

## 2022-03-23 NOTE — PROGRESS NOTES
Chief Complaint   Patient presents with   • Aortic Stenosis     F/V Dx: Nonrheumatic aortic valve stenosis   • Dyslipidemia     Subjective     Delonte Alexander is a 86 y.o. male who presents today for urgent appointment for weakness, shortness of breath, leg edema, and leg pain.    He is a patient of Dr. Acosta in our office. Hx of symptomatic aortic stenosis, CAD with complex PCI to L main with Impella placement, HTN, HLD, PAD, LBBB with first degree block, renal insufficiency, recent COVID infection (vaccinated) with underlying lung disease with pleural plaquing, acute on chronic respiratory failure on oxygen, and anemia with thrombocytopenia.    He presents today with his daughter Yvonne.    He is in a wheelchair and continues to weaken post-procedure. His daughter was concerned about a few things that an urgent consultation was recommended.    He comes in today stating overall weakness, leg fatigue and leg pain with ambulation, leg swelling, increased dyspnea with exertion,    He has no dizziness or palpitations. No chest pain.    His sites are healing well with mild bruising and soft to touch.    Past Medical History:   Diagnosis Date   • Breath shortness     with exertion   • CHF (congestive heart failure) (HCC)    • Dental disorder     upper denture   • Heart murmur    • High cholesterol    • Hyperlipidemia    • Hypertension      Past Surgical History:   Procedure Laterality Date   • HERNIA REPAIR       Family History   Problem Relation Age of Onset   • No Known Problems Mother    • Heart Disease Father    • Heart Disease Sister    • No Known Problems Sister      Social History     Socioeconomic History   • Marital status:      Spouse name: Not on file   • Number of children: Not on file   • Years of education: Not on file   • Highest education level: Not on file   Occupational History   • Not on file   Tobacco Use   • Smoking status: Former Smoker     Packs/day: 2.00     Years: 25.00     Pack years:  50.00     Types: Cigarettes     Quit date:      Years since quittin.2   • Smokeless tobacco: Never Used   Vaping Use   • Vaping Use: Never used   Substance and Sexual Activity   • Alcohol use: Not Currently   • Drug use: Never   • Sexual activity: Not on file   Other Topics Concern   • Not on file   Social History Narrative   • Not on file     Social Determinants of Health     Financial Resource Strain: Not on file   Food Insecurity: Not on file   Transportation Needs: Not on file   Physical Activity: Not on file   Stress: Not on file   Social Connections: Not on file   Intimate Partner Violence: Not on file   Housing Stability: Not on file     No Known Allergies  Outpatient Encounter Medications as of 3/23/2022   Medication Sig Dispense Refill   • furosemide (LASIX) 40 MG Tab Take 1 Tablet by mouth every day. 90 Tablet 3   • atorvastatin (LIPITOR) 40 MG Tab Take 1 Tablet by mouth every day. 30 Tablet 11   • clopidogrel (PLAVIX) 75 MG Tab Take 1 Tablet by mouth every day. 30 Tablet 11   • ipratropium-albuterol (DUONEB) 0.5-2.5 (3) MG/3ML nebulizer solution Take 3 mL by nebulization 2 times a day.     • Fluticasone-Umeclidin-Vilant (TRELEGY ELLIPTA INH) Inhale 1 Puff every day.     • benzonatate (TESSALON) 200 MG capsule Take 1 Capsule by mouth 3 times a day as needed for Cough. 90 Capsule 5   • guaiFENesin ER (MUCINEX) 600 MG TABLET SR 12 HR Take 600 mg by mouth 2 times a day.     • traZODone (DESYREL) 50 MG Tab Take 1 Tablet by mouth every evening. 30 Tablet 3   • acetaminophen (TYLENOL) 325 MG Tab Take 650 mg by mouth every 6 hours as needed.     • aspirin 81 MG EC tablet Take 81 mg by mouth every day.     • Multiple Vitamin (MULTIVITAMIN ADULT PO) Take 1 Tablet by mouth every day.     • Multiple Vitamins-Minerals (OCUVITE ADULT FORMULA PO) Take  by mouth.     • [DISCONTINUED] furosemide (LASIX) 40 MG Tab Take 0.5 Tablets by mouth every day for 30 days. 30 Tablet 2     No facility-administered encounter  medications on file as of 3/23/2022.     Review of Systems   Constitutional: Positive for malaise/fatigue. Negative for fever.   Respiratory: Positive for shortness of breath. Negative for cough.    Cardiovascular: Positive for leg swelling. Negative for chest pain, palpitations, orthopnea, claudication and PND.   Gastrointestinal: Negative for abdominal pain.   Musculoskeletal: Negative for myalgias.   Neurological: Positive for weakness.        Bilateral leg weakness and leg pain with walking              Objective     /52 (BP Location: Left arm, Patient Position: Sitting, BP Cuff Size: Adult)   Pulse 83   Resp 16   Ht 1.829 m (6')   Wt 82.1 kg (181 lb)   SpO2 91%   BMI 24.55 kg/m²     Physical Exam  Vitals and nursing note reviewed.   Constitutional:       Appearance: He is well-developed. He is cachectic. He is ill-appearing.   HENT:      Head: Normocephalic and atraumatic.   Neck:      Vascular: No JVD.   Cardiovascular:      Rate and Rhythm: Normal rate and regular rhythm.      Pulses: Normal pulses.      Heart sounds: Murmur heard.    Systolic murmur is present with a grade of 3/6.  Pulmonary:      Effort: Pulmonary effort is normal.      Breath sounds: Normal breath sounds.   Musculoskeletal:         General: Normal range of motion.   Skin:     General: Skin is warm and dry.      Capillary Refill: Capillary refill takes less than 2 seconds.      Coloration: Skin is pale.   Neurological:      General: No focal deficit present.      Mental Status: He is alert and oriented to person, place, and time. Mental status is at baseline.   Psychiatric:         Mood and Affect: Mood normal.         Behavior: Behavior normal.         Thought Content: Thought content normal.         Judgment: Judgment normal.                Assessment & Plan     1. Acute respiratory failure with hypoxemia (HCC)  IRON/TOTAL IRON BIND (FEIBC)    Comp Metabolic Panel    CBC WITHOUT DIFFERENTIAL    proBrain Natriuretic Peptide, NT    2. Dyslipidemia     3. Nonrheumatic aortic valve stenosis  proBrain Natriuretic Peptide, NT    furosemide (LASIX) 40 MG Tab   4. First degree AV block     5. Renal insufficiency     6. LBBB (left bundle branch block)     7. Pleural plaque     8. PAD (peripheral artery disease) (HCC)     9. Anemia, unspecified type  IRON/TOTAL IRON BIND (FEIBC)    CBC WITHOUT DIFFERENTIAL   10. Edema, unspecified type  proBrain Natriuretic Peptide, NT     Medical Decision Making: Today's Assessment/Status/Plan:      1. Worsening SCHULTE, edema, and pale in color with leg weakness  -recommend STAT labs  -removed all dressings to sites, sites clean, dry-no hematoma noted, mild bruising  -good pulses peripherally and cap refill  -pale in color, low hemoglobin post procedure-no blood in stool or bleeding at site  -check labs, consider arterial US if labs stable  -edema and volume overload present, recommend increase furosemide back to 40 mg QD  -watch BP and symptoms daily  -call for worsening symptoms    2. Acute on chronic respiratory failure  -recommend continue oxygen  -follow with pulmonary  -see above    3. CAD with impella placement with PCI to L circ, PCI to L main to LAD  -no angina, SCHULTE with lung disease and volume overload  -cont asa, plavix (1 year)  -concern for anemia and worsening symptoms-check STAT labs  -consider LE arterial US if symptoms worsen  -check vitals daily  -cont statin    4. PAD  -cont asa, plavix, statin    ER precautions given to proceed to ER for worsening symptoms.    Patient is to follow up with Wen JENSEN or Dr. Acosta in 1-2 weeks with review of labs (Call tomorrow with results).

## 2022-03-23 NOTE — TELEPHONE ENCOUNTER
Patient's daughter Yvonne called stating patient's Holter monitor that was put on yesterday afternoon came off at 0100 and is wondering what to do.     Message to Dr. Acosta

## 2022-03-23 NOTE — TELEPHONE ENCOUNTER
Med rec updated, called and notified daughter Yvonne.    Daughter states patient is experiencing severe, dull pain in his right upper thigh below his cath site that is constant when he walks. The pain lessens when he rests. She states the site looks more bruised, dark purple in color, but soft to the touch. Patient denies dizziness, light headedness, feeling faint, and does not appear pale per daughter.    O2, HR normal for patient    BP over the phone:  97/45, which is lower than normal. Usually SBP 100s, DBP 50s-60s.     Discussed with Wen JENSEN in clinic who recommends patient come in for visit with her to be evaluated.     Called daughter back and she will come in today.

## 2022-03-24 PROBLEM — M79.604 LOWER EXTREMITY PAIN, BILATERAL: Status: ACTIVE | Noted: 2022-01-01

## 2022-03-24 PROBLEM — E87.70 VOLUME OVERLOAD: Status: ACTIVE | Noted: 2022-01-01

## 2022-03-24 PROBLEM — M79.605 LOWER EXTREMITY PAIN, BILATERAL: Status: ACTIVE | Noted: 2022-01-01

## 2022-03-24 PROBLEM — R79.89 ELEVATED TROPONIN: Status: ACTIVE | Noted: 2022-01-01

## 2022-03-24 PROBLEM — K92.2 GI BLEED: Status: ACTIVE | Noted: 2022-01-01

## 2022-03-24 PROBLEM — N17.9 ACUTE KIDNEY FAILURE (HCC): Status: ACTIVE | Noted: 2022-01-01

## 2022-03-24 NOTE — ASSESSMENT & PLAN NOTE
Likely cardiorenal and anemia.  Transfusion for severe anemia.  Bladder scan  Urinalysis.  Continue to monitor

## 2022-03-24 NOTE — TELEPHONE ENCOUNTER
Received VM from patient's daughter Yvonne stating patient is very SOB, including at rest.    Reviewed labs and called Wen JENSEN. She recommends going to the ER for possible blood transfusion and then diuresis.    Called patient's daughter back to inform. She verbalizes understanding and will take patient to ER.

## 2022-03-24 NOTE — ASSESSMENT & PLAN NOTE
January 2022, was admitted for covid and received dexamethasone  Test positive, however no significant signs of active infection  No dexa due to concerning GI bleed.  No isolation indicated at this time.

## 2022-03-24 NOTE — ASSESSMENT & PLAN NOTE
Was planned for TAVR, had recent angio and received 4 stents on 3/21/2022.  BP control.  Cardiology following    -appreciate card recs

## 2022-03-24 NOTE — ED NOTES
Patient to room from [facility] in own wheelchair. Changed into gown, connected to monitor. On 3 L home O2. Agree with triage note. Charted for ERP. Call light within reach.    MD at bedside.

## 2022-03-24 NOTE — ED PROVIDER NOTES
"ED Provider Note    Scribed for Danial Adamson M.D. by Jl Montenegro. 3/24/2022  10:12 AM    Primary care provider: Nicole Tse P.A.-C.  Means of arrival: Walk-in  History obtained from: Patient and family member.  History limited by: None    CHIEF COMPLAINT  Chief Complaint   Patient presents with   • Sent by MD Dr Acosta increased sob, leg pain   • Post-op Problem     3/21 cardiac stents placed       HPI  Delonte Alexander is a 86 y.o. male who presents to the Emergency Department for leg pain onset 2 days ago. Patient had surgery 3/21 to have 4 cardiac stents placed due to blockages. He was discharged home Tuesday then developed dyspnea and leg pain. Yesterday he went to a doctor's office and was sent here for labs. Last night his dyspnea and leg pain both worsened. His labs came in this morning and he was told to come to the ER. His family member notes he has trouble walking \"15 ft\". Has associated cough and calf pain. Denies chest pain or fevers. Patient currently uses an inhaler once a day and a nebulizer twice a day. He denies any history of asthma, CHF, COPD, blood clots, DVT, or PE. He quit using tobacco over 30 years ago. The family member notes he has to sit up to breath. Patient experienced a fall 2-3 months ago for which he went to rehab and subsequently caught COVID. 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      REVIEW OF SYSTEMS  Pertinent positives include: leg pain, cough, and calf pain.  Pertinent negatives include: chest pain or fevers.  10+ systems reviewed and negative.      PAST MEDICAL HISTORY  Past Medical History:   Diagnosis Date   • Breath shortness     with exertion   • CHF (congestive heart failure) ([facility])    • Dental disorder     upper denture   • Heart murmur    • High cholesterol    • Hyperlipidemia    • Hypertension        FAMILY HISTORY  Family History   Problem Relation Age of Onset   • No Known Problems Mother    • Heart Disease Father    • Heart Disease Sister    • No Known " Problems Sister        SOCIAL HISTORY  Social History     Tobacco Use   • Smoking status: Former Smoker     Packs/day: 2.00     Years: 25.00     Pack years: 50.00     Types: Cigarettes     Quit date:      Years since quittin.2   • Smokeless tobacco: Never Used   Vaping Use   • Vaping Use: Never used   Substance Use Topics   • Alcohol use: Not Currently   • Drug use: Never     Social History     Substance and Sexual Activity   Drug Use Never       SURGICAL HISTORY  Past Surgical History:   Procedure Laterality Date   • HERNIA REPAIR         CURRENT MEDICATIONS  Home Medications     Reviewed by Jose De Jesus Reyes (Pharmacy Tech) on 22 at 1145  Med List Status: Complete   Medication Last Dose Status   acetaminophen (TYLENOL) 325 MG Tab 3/22/2022 Active   Apoaequorin (PREVAGEN PO) 3/24/2022 Active   aspirin 81 MG EC tablet 3/24/2022 Active   atorvastatin (LIPITOR) 40 MG Tab 3/23/2022 Active   benzonatate (TESSALON) 200 MG capsule 3/24/2022 Active   clopidogrel (PLAVIX) 75 MG Tab 3/23/2022 Active   Fluticasone-Umeclidin-Vilant (TRELEGY) 100-62.5-25 MCG/INH AEROSOL POWDER, BREATH ACTIVATED inhalation 3/24/2022 Active   furosemide (LASIX) 40 MG Tab 3/24/2022 Active   guaiFENesin ER (MUCINEX) 600 MG TABLET SR 12 HR 3/24/2022 Active   ipratropium-albuterol (DUONEB) 0.5-2.5 (3) MG/3ML nebulizer solution 3/24/2022 Active   Multiple Vitamin (MULTIVITAMIN ADULT PO) 3/24/2022 Active   Multiple Vitamins-Minerals (OCUVITE-LUTEIN) Tab 3/24/2022 Active   traZODone (DESYREL) 50 MG Tab 3/23/2022 Active                ALLERGIES  No Known Allergies    PHYSICAL EXAM  VITAL SIGNS: /47   Pulse 89   Temp 36.4 °C (97.6 °F) (Temporal)   Resp 18   Ht 1.829 m (6')   Wt 82.1 kg (181 lb)   SpO2 99%   BMI 24.55 kg/m²   Reviewed and afebrile, no hypoxia room air  Constitutional: Well developed, Well nourished, well-appearing, hard of hearing, pale.  HENT: Normocephalic, atraumatic, bilateral external ears normal, wearing  a mask.   Eyes: PERRLA, conjunctiva pink, no scleral icterus.   Cardiovascular: Regular rate and rhythm. 5/6 systolic murmur at the right border at the carotids. Possible trace of JVP. No rubs or gallops. 2+ pitting edema in the lower calf. No dependent edema or calf tenderness  Respiratory: Lungs clear to auscultation bilaterally. No wheezes, rales, or rhonchi.  Abdominal:  Abdomen soft, non-tender, non distended. No rebound, or guarding.    Skin: No erythema, no rash.   Genitourinary: No costovertebral angle tenderness.   Musculoskeletal: no deformities. Dorsalis pedis diminished bilaterally but warm feet and varicose veins.  Neurologic: Alert & oriented x 3, cranial nerves 2-12 intact by passive exam.  No focal deficit noted.  Psychiatric: Affect normal, Judgment normal, Mood normal.     DIFFERENTIAL DIAGNOSIS:  Heart failure, MI, critical stenosis, GI bleed. Doubt DVT or pneumonia.     EKG Interpretation 1:20 PM    Interpreted by me.  Indication: Dyspnea    Rhythm: normal sinus   Rate: Normal  Axis: normal  Ectopy: none  Conduction: Left bundle pattern   ST/T Waves: no acute change  Q Waves: none  R Wave progression: normal  Comparison: Stable from prior    Clinical Impression: Sinus rhythm with left bundle branch block      RADIOLOGY/PROCEDURES  DX-CHEST-LIMITED (1 VIEW)   Final Result      1.  Bilateral airspace opacities most prominent in the mid to lower lung zones may represent pulmonary edema or multifocal pneumonia.   2.  Small bilateral pleural effusions with overlying atelectasis/consolidation.   3.  Cardiomegaly. Correlation with echocardiogram is recommended as a pericardial effusion is not excluded.   4.  Calcified pleural plaques can be seen as a sequela of prior asbestos exposure.           Radiologist interpretation have been reviewed by me.     LABORATORY:  Results for orders placed or performed during the hospital encounter of 03/23/22   proBrain Natriuretic Peptide, NT   Result Value Ref Range     NT-proBNP 7391 (H) 0 - 125 pg/mL   CBC WITHOUT DIFFERENTIAL   Result Value Ref Range    WBC 8.0 4.8 - 10.8 K/uL    RBC 2.45 (L) 4.70 - 6.10 M/uL    Hemoglobin 7.1 (L) 14.0 - 18.0 g/dL    Hematocrit 23.0 (L) 42.0 - 52.0 %    MCV 93.9 81.4 - 97.8 fL    MCH 29.0 27.0 - 33.0 pg    MCHC 30.9 (L) 33.7 - 35.3 g/dL    RDW 50.3 (H) 35.9 - 50.0 fL    Platelet Count 101 (L) 164 - 446 K/uL    MPV 14.1 (H) 9.0 - 12.9 fL   Comp Metabolic Panel   Result Value Ref Range    Sodium 137 135 - 145 mmol/L    Potassium 4.8 3.6 - 5.5 mmol/L    Chloride 100 96 - 112 mmol/L    Co2 25 20 - 33 mmol/L    Anion Gap 12.0 7.0 - 16.0    Glucose 117 (H) 65 - 99 mg/dL    Bun 42 (H) 8 - 22 mg/dL    Creatinine 1.91 (H) 0.50 - 1.40 mg/dL    Calcium 9.0 8.5 - 10.5 mg/dL    AST(SGOT) 21 12 - 45 U/L    ALT(SGPT) 18 2 - 50 U/L    Alkaline Phosphatase 61 30 - 99 U/L    Total Bilirubin 0.4 0.1 - 1.5 mg/dL    Albumin 3.9 3.2 - 4.9 g/dL    Total Protein 5.6 (L) 6.0 - 8.2 g/dL    Globulin 1.7 (L) 1.9 - 3.5 g/dL    A-G Ratio 2.3 g/dL   IRON/TOTAL IRON BIND (FEIBC)   Result Value Ref Range    Iron 25 (L) 50 - 180 ug/dL    Total Iron Binding 187 (L) 250 - 450 ug/dL    Unsat Iron Binding 162 110 - 370 ug/dL    % Saturation 13 (L) 15 - 55 %   ESTIMATED GFR   Result Value Ref Range    GFR (CKD-EPI) 34 (A) >60 mL/min/1.73 m 2       Lab results from yesterday reviewed by me.     Labs today:  Results for orders placed or performed during the hospital encounter of 03/24/22   CBC WITH DIFFERENTIAL   Result Value Ref Range    WBC 7.0 4.8 - 10.8 K/uL    RBC 2.29 (L) 4.70 - 6.10 M/uL    Hemoglobin 6.7 (L) 14.0 - 18.0 g/dL    Hematocrit 21.5 (L) 42.0 - 52.0 %    MCV 93.9 81.4 - 97.8 fL    MCH 29.3 27.0 - 33.0 pg    MCHC 31.2 (L) 33.7 - 35.3 g/dL    RDW 49.7 35.9 - 50.0 fL    Platelet Count 98 (L) 164 - 446 K/uL    MPV 13.8 (H) 9.0 - 12.9 fL    Neutrophils-Polys 84.10 (H) 44.00 - 72.00 %    Lymphocytes 4.90 (L) 22.00 - 41.00 %    Monocytes 7.00 0.00 - 13.40 %    Eosinophils  2.90 0.00 - 6.90 %    Basophils 0.40 0.00 - 1.80 %    Immature Granulocytes 0.70 0.00 - 0.90 %    Nucleated RBC 0.00 /100 WBC    Neutrophils (Absolute) 5.89 1.82 - 7.42 K/uL    Lymphs (Absolute) 0.34 (L) 1.00 - 4.80 K/uL    Monos (Absolute) 0.49 0.00 - 0.85 K/uL    Eos (Absolute) 0.20 0.00 - 0.51 K/uL    Baso (Absolute) 0.03 0.00 - 0.12 K/uL    Immature Granulocytes (abs) 0.05 0.00 - 0.11 K/uL    NRBC (Absolute) 0.00 K/uL   Comp Metabolic Panel   Result Value Ref Range    Sodium 139 135 - 145 mmol/L    Potassium 4.6 3.6 - 5.5 mmol/L    Chloride 101 96 - 112 mmol/L    Co2 27 20 - 33 mmol/L    Anion Gap 11.0 7.0 - 16.0    Glucose 101 (H) 65 - 99 mg/dL    Bun 42 (H) 8 - 22 mg/dL    Creatinine 1.81 (H) 0.50 - 1.40 mg/dL    Calcium 9.2 8.5 - 10.5 mg/dL    AST(SGOT) 15 12 - 45 U/L    ALT(SGPT) 13 2 - 50 U/L    Alkaline Phosphatase 58 30 - 99 U/L    Total Bilirubin 0.5 0.1 - 1.5 mg/dL    Albumin 3.6 3.2 - 4.9 g/dL    Total Protein 6.0 6.0 - 8.2 g/dL    Globulin 2.4 1.9 - 3.5 g/dL    A-G Ratio 1.5 g/dL   COD - Adult (Type and Screen)   Result Value Ref Range    ABO Grouping Only O     Rh Grouping Only POS     Antibody Screen-Cod NEG     Component R       R99                 Red Cells, LR       [id number]   issued       03/24/22   11:49      Product Type R99     Dispense Status issued     Unit Number (Barcoded) [id number]     Product Code (Barcoded) P2304W46     Blood Type (Barcoded) 5100    Prothrombin Time   Result Value Ref Range    PT 14.5 12.0 - 14.6 sec    INR 1.16 (H) 0.87 - 1.13   TROPONIN   Result Value Ref Range    Troponin T 322 (H) 6 - 19 ng/L   D-DIMER   Result Value Ref Range    D-Dimer Screen 2.39 (H) 0.00 - 0.50 ug/mL (FEU)   CoV-2, FLU A/B, and RSV by PCR (2-4 Hours CEPHEID) : Collect NP swab in VTM    Specimen: Respirate   Result Value Ref Range    Influenza virus A RNA Negative Negative    Influenza virus B, PCR Negative Negative    RSV, PCR Negative Negative    SARS-CoV-2 by PCR DETECTED (AA)      SARS-CoV-2 Source NP Swab          ED COURSE:  Nursing notes, VS, PMSFHx reviewed in chart.     10:12 AM - Patient seen and examined at bedside. Ordered DX-Chest-Limited, CBC w/diff, CMP, COD-Adult, PTT, D-dimerand Troponin to evaluate.     Lasix was not ordered due to renal failure. After exam was not needed.    10:57 AM - Patient has no prior history of GI bleed or melena. On rectal exam, patient is trace heme positive with brown stool.    11:12 AM - Ordered CoV-2, Flu A/B, and RSV to evaluate.    11:33 AM - Paged hospitalist and cardiology    11:46 AM I discussed the patient's case and the above findings with Dr. Lopez (Hospitalist) who agreed to evaluate the patient for admission to the hospital.    Review of medical records show patient's transfusion level yesterday was 7.1. BNP 7300. Creatinine 1.9. CTA chest 3/17 showed a calcified tricuspid aortic valve. Severe calcification of both iliacs. Extensive calcified right pleural plaque. Small infra abdominal aneurysm of aorta. Patient has not had an echo since 2014.6     MEDICAL DECISION MAKING:  This patient presents with new onset acute CHF with elevated troponin and recent acute kidney injury.  His troponin elevation is likely due to his kidney injury and heart strain.  There is no evidence of STEMI.  He has had no chest pain.  He also has anemia and has some hemoglobin in his stool.  Some of his anemia is likely due to blood loss from his recent cardiac catheterization.  He is high risk for transfusion.  His afterload is already well reduced.  He has quite severe aortic stenosis based on review of CT from this month.  He needs an echocardiogram.  In his acute kidney injury I did not treat him immediately with Lasix.    PLAN:  Admission for echocardiogram, diuresis, transfusion, work-up of GI bleed, treatment of aortic stenosis    CONDITION: Guarded.  Critical care time 40 minutes    FINAL IMPRESSION  1. Acute congestive heart failure, unspecified heart  failure type (HCC)    2. Aortic valve stenosis, etiology of cardiac valve disease unspecified    3. Gastrointestinal hemorrhage, unspecified gastrointestinal hemorrhage type    4. Iron deficiency anemia due to chronic blood loss    5. Peripheral vascular disease (HCC)    6.     Critical care  7.     I have explained to the patient the risks and benefits of transfusion of blood products.  This includes, as appropriate, the risk of mild allergic reaction, hemolytic reaction, transfusion-associated lung injury, febrile reactions, circulatory or iron overload, and infection.    We discussed possible alternatives and their risks, including directed donation, autologous transfusion, and no transfusion, including IV or oral iron supplementation, as appropriate.  I believe the patient understands the risks and benefits and was able to express understanding.     Jl BUCK (Scribe), am scribing for, and in the presence of, Danial Adamson M.D..    Electronically signed by: Jl Montenegro (Scribmagdalena), 3/24/2022    Danial BUCK M.D. personally performed the services described in this documentation, as scribed by Jl Montenegro in my presence, and it is both accurate and complete.    The note accurately reflects work and decisions made by me.  Danial Adamson M.D.  3/24/2022  1:23 PM

## 2022-03-24 NOTE — PROGRESS NOTES
A/P    Delonte Alexander is a 86 y.o. male with h/o of severe AS, TAA, 3V CAD s/p multiple PCI 3/21/2021, covid + 1/2022 (vaccinated pfizer + booster) p/w subacute nonorthopnic SCHULTE 2/2 symptomatic anemia requiring transfusion/severe AS        # nonorthopnic SCHULTE 2/2 symptomatic anemia requiring transfusion  - since 3/2022 patient drop hgb 10 to 6 s/p 1 unir PRBC, p/w SCHULTE, non orthopnic, on baseline oxygen a/w hip and thigh pain noted b/l bruising around prior catheter sites without bruit  - denies melena, hematochezia, abd pain  - CT scan w/o pelvis/b/l hips asses for hematoma if negative gi consult r/o occult bleed while DAPT  - get LE u/s as has CKD hold of CTPE  - ferritin, b12, ldh, haptoglobin, lfts r/o hemolysis from severe AS    # severe AS,  # 3V CAD s/p multiple PCI 3/21/2021  - cnt asa/plavix, satin, metop, bb  - hold ace given ANDREW vs CKD? crt 1.7 no baseline since 2008  - probnp and trops 7K and 300 respectivly w/o baseline, denies cp  - consult cards  - tte  - empiric diureis goal 1-2L is preload dependent so strict ins/out, if no improvement in dyspnea hold diuresis, looks relatively euvolemic

## 2022-03-24 NOTE — TELEPHONE ENCOUNTER
"Patient's daughter Yvonne called stating they are at [facility] and the triage nurse \"didn't have record of patient's surgery on 3/21.\" Ensured daughter they will have access to all of his [facility] records including cath procedure on 3/21.    "

## 2022-03-24 NOTE — ASSESSMENT & PLAN NOTE
Resolved  S/p IV lasix with net neg 3L.  Likely due to Severe aortic stenosis  Has been compliant with lasix  CXR consistent with bilateral pleural effusion and pulmonary edema  BNP 7k  No chest pain  No need for TTE given recent cathetherization.

## 2022-03-24 NOTE — ASSESSMENT & PLAN NOTE
Significant anemia with 6.7, 14 baseline.  Likely 2/2 to recent cath procedure 3/21 that was c/b bilateral groin hematomas.  GI bleed r/o- s/p EGD/colonscopy 3/26 with no sign of active bleed  S/p 3 units PRBC  Plt 95  TEG study benign  Ferritin 457  Fe 25  B12/TSH wnl  Hgb stable   HD stable  Iliofemoral arterial US without sign of pseudoaneurysm.     -continue ASA/Plavix

## 2022-03-24 NOTE — PROGRESS NOTES
Admit for FVO and anemia at 1500, bedside report received from ED RN. Pt transferred to T804 on [facility], oriented to room, shift POC. All questions answered.

## 2022-03-24 NOTE — ED NOTES
IV access placed. Blood drawn and sent to lab. Patient denies further needs. Call light within reach.

## 2022-03-24 NOTE — ED NOTES
Consent obtained for blood product at bedside by Dr. Adamson. Consent form signed and in patient chart.

## 2022-03-24 NOTE — ED TRIAGE NOTES
.  Chief Complaint   Patient presents with   • Sent by MD Dr Acosta increased sob, leg pain   • Post-op Problem     3/21 cardiac stents placed     Pt had labs 3/23 hgb 7.1 sent by kassie for further evaluation. Wears home o2 3l

## 2022-03-24 NOTE — ASSESSMENT & PLAN NOTE
Resolved.  Patient is on baseline O2 requirement of 2-3L since COVID infection in January 2022  Dyspnea likely related so severe AS

## 2022-03-24 NOTE — ASSESSMENT & PLAN NOTE
Elevated 321, stable. Likely elevated after PCI.  No chest pain  No signfiicant ischemia on ekg    -f/u with outpatient cardiology

## 2022-03-24 NOTE — ED NOTES
Patient moved onto a hospital bed. Urinal provided. Patient denies further needs. Blood bank asked for 10 more minutes prior to being able to release blood.

## 2022-03-24 NOTE — ED NOTES
Bedside report given to T804 RN Gini. Patient transported with ACLS RN on tele monitor. All patient belongings and chart sent with patient. Patient care transferred. Receiving RN assuming care.

## 2022-03-24 NOTE — ASSESSMENT & PLAN NOTE
Resolved.  Walked 3/27/2022 with no pain.   Bilateral venous doppler negative for DVT  Concern for arterial pathology given recent PCI procedure  No signs of cellulitis.  Weak distal pulses though feet appear well perfused   Arterial US without sign of pseudoaneurysm.

## 2022-03-24 NOTE — PROGRESS NOTES
4 Eyes Skin Assessment Completed by GRAHAM Rubalcava and GRAHAM Gamble.    Head WDL  Ears WDL  Nose WDL  Mouth WDL  Neck WDL  Breast/Chest WDL  Shoulder Blades WDL  Spine Redness and Blanching  (R) Arm/Elbow/Hand WDL  (L) Arm/Elbow/Hand WDL  Abdomen WDL  Groin Bruising  Scrotum/Coccyx/Buttocks Redness and Blanching  (R) Leg WDL  (L) Leg WDL  (R) Heel/Foot/Toe WDL  (L) Heel/Foot/Toe WDL          Devices In Places Tele Box, Pulse Ox and Nasal Cannula      Interventions In Place NC W/Ear Foams, Pillows and Pressure Redistribution Mattress    Possible Skin Injury No    Pictures Uploaded Into Epic N/A  Wound Consult Placed N/A  RN Wound Prevention Protocol Ordered No

## 2022-03-25 PROBLEM — I25.10 CAD, MULTIPLE VESSEL: Status: ACTIVE | Noted: 2022-01-01

## 2022-03-25 NOTE — PROGRESS NOTES
Interval History:  86-year-old male with multivessel CAD and severe AS status post Impella assisted left main PCI 3/21/2022 presents with symptomatic anemia and bilateral leg pain.  3/25: No vascular or cardiovascular imaging was completed as recommended yesterday.  Patient feels unchanged.  Hemoglobin remains under goal of 9.  Pending gastroenterology consultation.    Physical Exam   Blood pressure 128/61, pulse 68, temperature 36.5 °C (97.7 °F), temperature source Temporal, resp. rate 16, height 1.829 m (6'), weight 82.1 kg (181 lb), SpO2 93 %.    Constitutional: Appears well-developed.   HENT: Normocephalic and atraumatic. No scleral icterus.   Neck: No JVD present.   Cardiovascular: Normal rate. Exam reveals no gallop and no friction rub. No murmur heard.   Pulmonary/Chest: CTAB    Abdominal: S/NT/ND BS+   Musculoskeletal: Exhibits no edema. Pulses present.  Skin: Skin is warm and dry.     ROS: As HPI other reviewed and negative       Intake/Output Summary (Last 24 hours) at 3/25/2022 0942  Last data filed at 3/25/2022 0900  Gross per 24 hour   Intake 1097.33 ml   Output 1100 ml   Net -2.67 ml       Recent Labs     03/23/22  1626 03/24/22  1025 03/24/22  2147 03/25/22  0238   WBC 8.0 7.0  --  5.7   RBC 2.45* 2.29*  --  2.77*   HEMOGLOBIN 7.1* 6.7* 7.2* 8.1*   HEMATOCRIT 23.0* 21.5*  --  25.6*   MCV 93.9 93.9  --  92.4   MCH 29.0 29.3  --  29.2   MCHC 30.9* 31.2*  --  31.6*   RDW 50.3* 49.7  --  48.6   PLATELETCT 101* 98*  --  95*   MPV 14.1* 13.8*  --  13.9*     Recent Labs     03/23/22  1626 03/24/22  1025 03/25/22  0238   SODIUM 137 139 139   POTASSIUM 4.8 4.6 4.6   CHLORIDE 100 101 104   CO2 25 27 25   GLUCOSE 117* 101* 91   BUN 42* 42* 41*   CREATININE 1.91* 1.81* 1.84*   CALCIUM 9.0 9.2 8.7     Recent Labs     03/24/22  1025   INR 1.16*                   No current facility-administered medications on file prior to encounter.     Current Outpatient Medications on File Prior to Encounter   Medication Sig  Dispense Refill   • benzonatate (TESSALON) 200 MG capsule Take 200 mg by mouth in the morning, at noon, and at bedtime.     • Multiple Vitamins-Minerals (OCUVITE-LUTEIN) Tab Take 1 Tablet by mouth every day.     • Apoaequorin (PREVAGEN PO) Take 1 Tablet by mouth every day.     • oxyCODONE-acetaminophen (PERCOCET) 5-325 MG Tab Take 0.5 Tablets by mouth 1 time a day as needed. Rarely takes  Indications: Pain     • furosemide (LASIX) 40 MG Tab Take 1 Tablet by mouth every day. 90 Tablet 3   • atorvastatin (LIPITOR) 40 MG Tab Take 1 Tablet by mouth every day. 30 Tablet 11   • clopidogrel (PLAVIX) 75 MG Tab Take 1 Tablet by mouth every day. 30 Tablet 11   • ipratropium-albuterol (DUONEB) 0.5-2.5 (3) MG/3ML nebulizer solution Take 3 mL by nebulization 2 times a day.     • Fluticasone-Umeclidin-Vilant (TRELEGY) 100-62.5-25 MCG/INH AEROSOL POWDER, BREATH ACTIVATED inhalation Inhale 1 Puff every day.     • guaiFENesin ER (MUCINEX) 600 MG TABLET SR 12 HR Take 600 mg by mouth 2 times a day.     • traZODone (DESYREL) 50 MG Tab Take 1 Tablet by mouth every evening. 30 Tablet 3   • acetaminophen (TYLENOL) 325 MG Tab Take 650 mg by mouth every 6 hours as needed. Indications: Pain     • aspirin 81 MG EC tablet Take 81 mg by mouth every day.     • Multiple Vitamin (MULTIVITAMIN ADULT PO) Take 1 Tablet by mouth every day.         Current Facility-Administered Medications   Medication Dose Frequency Provider Last Rate Last Admin   • potassium chloride SA (Kdur) tablet 20 mEq  20 mEq BID Diego Franco M.D.   20 mEq at 03/25/22 0428   • senna-docusate (PERICOLACE or SENOKOT S) 8.6-50 MG per tablet 2 Tablet  2 Tablet BID Diego Franco M.D.   2 Tablet at 03/25/22 0428    And   • polyethylene glycol/lytes (MIRALAX) PACKET 1 Packet  1 Packet QDAY PRN Diego Franco M.D.        And   • magnesium hydroxide (MILK OF MAGNESIA) suspension 30 mL  30 mL QDAY PRN Diego Franco M.D.        And   • bisacodyl (DULCOLAX) suppository 10 mg  10 mg QDAY  PRN Diego Franco M.D.       • hydrALAZINE (APRESOLINE) injection 10 mg  10 mg Q4HRS PRN Diego Franco M.D.       • pantoprazole (Protonix) injection 40 mg  40 mg BID Diego Franco M.D.   40 mg at 03/25/22 0428   • aspirin EC (ECOTRIN) tablet 81 mg  81 mg DAILY Diego Franco M.D.   81 mg at 03/25/22 0427   • atorvastatin (LIPITOR) tablet 40 mg  40 mg Q EVENING Diego Franco M.D.   40 mg at 03/24/22 1804   • clopidogrel (PLAVIX) tablet 75 mg  75 mg Q EVENING Diego Franco M.D.   75 mg at 03/24/22 1804   • budesonide-formoterol (SYMBICORT) 160-4.5 MCG/ACT inhaler 2 Puff  2 Puff BID (RT) Diego Franco M.D.   2 Puff at 03/25/22 0652   • tiotropium (Spiriva Respimat) 2.5 mcg/Act inhalation spray 5 mcg  5 mcg QDAILY (RT) Diego Franco M.D.   5 mcg at 03/25/22 0653   • ipratropium-albuterol (DUONEB) nebulizer solution  3 mL Q4H PRN (RT) Diego Franco M.D.       • furosemide (LASIX) tablet 40 mg  40 mg DAILY Diego Franco M.D.   40 mg at 03/25/22 0427   • oxyCODONE-acetaminophen (PERCOCET) 5-325 MG per tablet 1 Tablet  1 Tablet Q6HRS PRN Lane Ulloa M.D.   1 Tablet at 03/25/22 0106   • diclofenac sodium (Voltaren) 1 % gel 2 g  2 g 4X/DAY PRN Lane Ulloa M.D.       Last reviewed on 3/24/2022 11:45 AM by Agueda Ramirez PhT    Medications reviewed    Imaging reviewed      Impressions:  1.  Acute GI bleeding requiring transfusion  2.  Bilateral groin hematomas  3.  Bilateral thigh pain  4.  Dyspnea on exertion related to above  5.  Severe aortic stenosis  6.  Very recent multivessel and complex left main PCI, Impella supported  7.  COVID-19 positive  8.  Anemia, stable but undertreated    Recommendations:  Recommend transfusion and close monitoring of hemoglobin to keep hemoglobin greater than 9 due to the multivessel CAD with recent PCI and severe aortic stenosis with high myocardial oxygen demand.     Currently seems stable and the risk benefits of holding his aspirin and Plavix and having left main stent  occlusion versus ongoing subacute bleeding favors continuing aspirin and Plavix for the time being and seeking gastroenterology evaluation for a reversible source of bleeding.  Also recommend evaluation for iatrogenic femoral artery stenosis after large bore intervention and percutaneous closure.     1.  Continue aspirin Plavix unless repeat bleeding or not responsive to transfusions.  At which point stopping the aspirin and Plavix would be required.  2.  Transfuse to hemoglobin greater than 9 as above  3.  Gastroenterology consultation for EGD and colonoscopy consideration.  He is moderate risk this is nonmodifiable and required for ongoing life-saving cardiac therapies.  4.  Continue his other cardiac medications as tolerated  5.  Echocardiogram   6.  Recommend bilateral femoral artery Doppler ultrasonography to rule out stenosis and pseudoaneurysms.

## 2022-03-25 NOTE — CONSULTS
Reason of Consult: Anemia and dyspnea as well as leg pain    Consulting Physician: Dr. Donovan    HPI:  Is an 86-year-old male with multivessel coronary disease and severe aortic stenosis who is status post recent Impella assisted left main and multivessel PCI 3/21/2022 complicated by bilateral groin hematomas managed perioperatively percutaneously.  Presents with guaiac positive stools and hemoglobin of 6 after noticing progressive dyspnea on exertion and bilateral thigh aching whenever he would ambulate or stand.  Denies any chest discomfort.  He has been compliant with medications.  Upon arrival his hemoglobin is noted to be 6.7.  Covid swab is positive but he was not in an isolation room nor were any signage available.  Covid swab was positive from several months ago as well.  No tobacco alcohol or drug use CAD.    Past Medical History:   Diagnosis Date   • Breath shortness     with exertion   • CHF (congestive heart failure) (HCC)    • Dental disorder     upper denture   • Heart murmur    • High cholesterol    • Hyperlipidemia    • Hypertension        Social History     Socioeconomic History   • Marital status:      Spouse name: Not on file   • Number of children: Not on file   • Years of education: Not on file   • Highest education level: Not on file   Occupational History   • Not on file   Tobacco Use   • Smoking status: Former Smoker     Packs/day: 2.00     Years: 25.00     Pack years: 50.00     Types: Cigarettes     Quit date:      Years since quittin.2   • Smokeless tobacco: Never Used   Vaping Use   • Vaping Use: Never used   Substance and Sexual Activity   • Alcohol use: Not Currently   • Drug use: Never   • Sexual activity: Not on file   Other Topics Concern   • Not on file   Social History Narrative   • Not on file     Social Determinants of Health     Financial Resource Strain: Not on file   Food Insecurity: Not on file   Transportation Needs: Not on file   Physical Activity: Not on file    Stress: Not on file   Social Connections: Not on file   Intimate Partner Violence: Not on file   Housing Stability: Not on file       No current facility-administered medications on file prior to encounter.     Current Outpatient Medications on File Prior to Encounter   Medication Sig Dispense Refill   • benzonatate (TESSALON) 200 MG capsule Take 200 mg by mouth in the morning, at noon, and at bedtime.     • Multiple Vitamins-Minerals (OCUVITE-LUTEIN) Tab Take 1 Tablet by mouth every day.     • Apoaequorin (PREVAGEN PO) Take 1 Tablet by mouth every day.     • oxyCODONE-acetaminophen (PERCOCET) 5-325 MG Tab Take 0.5 Tablets by mouth 1 time a day as needed. Rarely takes  Indications: Pain     • furosemide (LASIX) 40 MG Tab Take 1 Tablet by mouth every day. 90 Tablet 3   • atorvastatin (LIPITOR) 40 MG Tab Take 1 Tablet by mouth every day. 30 Tablet 11   • clopidogrel (PLAVIX) 75 MG Tab Take 1 Tablet by mouth every day. 30 Tablet 11   • ipratropium-albuterol (DUONEB) 0.5-2.5 (3) MG/3ML nebulizer solution Take 3 mL by nebulization 2 times a day.     • Fluticasone-Umeclidin-Vilant (TRELEGY) 100-62.5-25 MCG/INH AEROSOL POWDER, BREATH ACTIVATED inhalation Inhale 1 Puff every day.     • guaiFENesin ER (MUCINEX) 600 MG TABLET SR 12 HR Take 600 mg by mouth 2 times a day.     • traZODone (DESYREL) 50 MG Tab Take 1 Tablet by mouth every evening. 30 Tablet 3   • acetaminophen (TYLENOL) 325 MG Tab Take 650 mg by mouth every 6 hours as needed. Indications: Pain     • aspirin 81 MG EC tablet Take 81 mg by mouth every day.     • Multiple Vitamin (MULTIVITAMIN ADULT PO) Take 1 Tablet by mouth every day.         Current Facility-Administered Medications   Medication Dose Frequency Provider Last Rate Last Admin   • furosemide (LASIX) injection 40 mg  40 mg BID DIURETIC Diego Franco M.D.       • potassium chloride SA (Kdur) tablet 20 mEq  20 mEq BID Diego Franco M.D.       • senna-docusate (PERICOLACE or SENOKOT S) 8.6-50 MG per  tablet 2 Tablet  2 Tablet BID Diego Franco M.D.        And   • polyethylene glycol/lytes (MIRALAX) PACKET 1 Packet  1 Packet QDAY PRN Diego Franco M.D.        And   • magnesium hydroxide (MILK OF MAGNESIA) suspension 30 mL  30 mL QDAY PRN Diego Franco M.D.        And   • bisacodyl (DULCOLAX) suppository 10 mg  10 mg QDAY PRN Diego Franco M.D.       • hydrALAZINE (APRESOLINE) injection 10 mg  10 mg Q4HRS PRN Diego Franco M.D.       • pantoprazole (Protonix) injection 40 mg  40 mg BID Diego Franco M.D.       • [START ON 3/25/2022] aspirin EC (ECOTRIN) tablet 81 mg  81 mg DAILY Diego Franco M.D.       • atorvastatin (LIPITOR) tablet 40 mg  40 mg Q EVENING Diego Franco M.D.       • clopidogrel (PLAVIX) tablet 75 mg  75 mg Q EVENING Diego Franco M.D.       • oxyCODONE-acetaminophen (PERCOCET) 5-325 MG per tablet 0.5 Tablet  0.5 Tablet QDAY PRN Diego Franco M.D.       • budesonide-formoterol (SYMBICORT) 160-4.5 MCG/ACT inhaler 2 Puff  2 Puff BID (RT) Diego Franco M.D.       • tiotropium (Spiriva Respimat) 2.5 mcg/Act inhalation spray 5 mcg  5 mcg QDAILY (RT) Diego Franco M.D.       • ipratropium-albuterol (DUONEB) nebulizer solution  3 mL Q4H PRN (RT) Diego Franco M.D.       Last reviewed on 3/24/2022 11:45 AM by Jose De Jesus Reyes      Patient has no known allergies.    Family History   Problem Relation Age of Onset   • No Known Problems Mother    • Heart Disease Father    • Heart Disease Sister    • No Known Problems Sister        ROS: As per HPI all other systems reviewed and negative     Physical Exam   Blood pressure 121/63, pulse 83, temperature 36.6 °C (97.8 °F), temperature source Temporal, resp. rate 20, height 1.829 m (6'), weight 82.1 kg (181 lb), SpO2 94 %.    Constitutional: Appears well-developed.   HENT: Normocephalic and atraumatic. No scleral icterus.  Pale  Neck: No JVD present.   Cardiovascular: Normal rate. Exam reveals no gallop and no friction rub.  4/6 systolic murmur heard.    Pulmonary/Chest: Coarse  Abdominal: S/NT/ND BS+   Musculoskeletal:  Pulses present. No atrophy. Strength normal.  Extremities: Exhibits no edema. No clubbing or cyanosis.   Skin: Skin is warm and dry.   Neuro: Non-focal, CN 2-12 intact grossly  Bilateral groin hematomas.  Positive pulses      Intake/Output Summary (Last 24 hours) at 3/24/2022 1714  Last data filed at 3/24/2022 1400  Gross per 24 hour   Intake 300 ml   Output 300 ml   Net 0 ml       Recent Labs     03/22/22  1220 03/23/22  1626 03/24/22  1025   WBC 8.6 8.0 7.0   RBC 2.79* 2.45* 2.29*   HEMOGLOBIN 8.1* 7.1* 6.7*   HEMATOCRIT 26.6* 23.0* 21.5*   MCV 95.3 93.9 93.9   MCH 29.0 29.0 29.3   MCHC 30.5* 30.9* 31.2*   RDW 51.5* 50.3* 49.7   PLATELETCT 102* 101* 98*   MPV 14.3* 14.1* 13.8*     Recent Labs     03/22/22  0340 03/23/22  1626 03/24/22  1025   SODIUM 137 137 139   POTASSIUM 5.0 4.8 4.6   CHLORIDE 104 100 101   CO2 23 25 27   GLUCOSE 94 117* 101*   BUN 40* 42* 42*   CREATININE 1.60* 1.91* 1.81*   CALCIUM 8.7 9.0 9.2     Recent Labs     03/24/22  1025   INR 1.16*                   Imaging reviewed including CT abdomen pelvis without showing no evidence of retroperitoneal bleeding but groin hematomas..    Guaiac positive    Impressions:  1.  Acute GI bleeding requiring transfusion  2.  Bilateral groin hematomas  3.  Bilateral thigh pain  4.  Dyspnea on exertion related to above  5.  Severe aortic stenosis  6.  Very recent multivessel and complex left main PCI, Impella supported  7.  COVID-19 positive    Recommendations:  Recommend transfusion and close monitoring of hemoglobin to keep hemoglobin greater than 9 due to the multivessel CAD with recent PCI and severe aortic stenosis with high myocardial oxygen demand.    Currently seems stable and the risk benefits of holding his aspirin and Plavix and having left main stent occlusion versus ongoing subacute bleeding favors continuing aspirin and Plavix for the time being and seeking gastroenterology  evaluation for a reversible source of bleeding.  Also recommend evaluation for iatrogenic femoral artery stenosis after large bore intervention and percutaneous closure.    1.  Continue aspirin Plavix unless repeat bleeding or not responsive to transfusions.  At which point stopping the aspirin and Plavix would be required.  2.  Transfuse to hemoglobin greater than 9 as above  3.  Gastroenterology consultation for EGD and colonoscopy consideration.  He is moderate risk this is nonmodifiable and required for ongoing life-saving cardiac therapies.  4.  Continue his other cardiac medications as tolerated  5.  Echocardiogram is there are no echocardiographic images available for review at this facility which is quite unusual as he has been undergoing multiple highly invasive high risk cardiac procedures here.  6.  Recommend bilateral femoral artery Doppler ultrasonography to rule out stenosis and pseudoaneurysms.    Discussed with the referring physician and bedside nursing.

## 2022-03-25 NOTE — CONSULTS
Gastroenterology Consult Note     Date of Consult: 2022  Referring Physician: Venus     Reason for consult: acute blood loss anemia        HPI: This is an 85 yo male with history of CAD, AS and CHF who was admitted after undergoing cardiac cath earlier this week. He says that he has severe aortic valve disease and he was told he needed cardiac stent placement before he could undergo TAVR. He had 4 stent placed earlier this week. He was noted to have bleeding from the right femoral artery after the procedure. After his procedure, he had increasing pain in both legs especially when walking. He started having increased SCHULTE and SOB. He says that when he lays down he feels better and his leg pain decreases. In addition to his complaints, he was found to have a drop in his Hb and there was concern for bleeding. He denies hematochezia or melena. CT did not reveal a retroperitoneal hematoma but he is scheduled to get US of his legs. We were consulted for evaluation of his anemia and cardiology has stated the patient is at moderate risk.     PMHX:  Past Medical History:   Diagnosis Date   • Breath shortness     with exertion   • CHF (congestive heart failure) (HCC)    • Dental disorder     upper denture   • Heart murmur    • High cholesterol    • Hyperlipidemia    • Hypertension           PSurgHx:   Past Surgical History:   Procedure Laterality Date   • HERNIA REPAIR          ALLERGIES:Patient has no known allergies.     SocHx:   Social History     Socioeconomic History   • Marital status:      Spouse name: Not on file   • Number of children: Not on file   • Years of education: Not on file   • Highest education level: Not on file   Occupational History   • Not on file   Tobacco Use   • Smoking status: Former Smoker     Packs/day: 2.00     Years: 25.00     Pack years: 50.00     Types: Cigarettes     Quit date:      Years since quittin.2   • Smokeless tobacco:  Never Used   Vaping Use   • Vaping Use: Never used   Substance and Sexual Activity   • Alcohol use: Not Currently   • Drug use: Never   • Sexual activity: Not on file   Other Topics Concern   • Not on file   Social History Narrative   • Not on file     Social Determinants of Health     Financial Resource Strain: Not on file   Food Insecurity: Not on file   Transportation Needs: Not on file   Physical Activity: Not on file   Stress: Not on file   Social Connections: Not on file   Intimate Partner Violence: Not on file   Housing Stability: Not on file        FAMHx:   Family History   Problem Relation Age of Onset   • No Known Problems Mother    • Heart Disease Father    • Heart Disease Sister    • No Known Problems Sister         ROS:  Constitutional: No fevers, chills, no night sweats, no weight changes  HEENT: no vision or hearing changes, no dry mouth, no change in smell  CARDIO: no palpitations, no orthopnea, no chest pain  PULM: no cough, + shortness of breath, +SCHULTE  NEURO: no Seizures, no memory impairment, no change in sensation  GI: as above  : no dysuria, no hematuria  HEME: no anemia, no easy brusing  MUSCULOSKELETAL: no muscle aches, no back pain, no arthritis  PSYCH: no anxiety or depression  SKIN: no rashes     PE:  Vitals:    03/25/22 0537 03/25/22 0649 03/25/22 0652 03/25/22 0714   BP: 129/63 141/70  128/61   Pulse: 70 70 75 68   Resp: 16 18 18 16   Temp: 36.7 °C (98.1 °F) 36.8 °C (98.3 °F)  36.5 °C (97.7 °F)   TempSrc: Temporal Temporal  Temporal   SpO2: 91% 93% 90% 93%   Weight:       Height:         Gen: AAOx3, NAD, lying in bed  HEENT: PERRL, EOMI, nares patent, Mucous membranes moist  Neck: supple, no cervical or supraclavicular adenopathy  CVS: regular rhythm, normal rate, holosystolic murmur  Pulm: CTAB, no crackles  Abd: soft, Nd, NT, no guarding or rebound  Ext: no edema, normal sensation  NEURO: grossly normal, no weakness  Skin: warm, no rash  Psych: normal Affect, no anxiety     LABS:  Lab  Results   Component Value Date/Time    SODIUM 139 03/25/2022 02:38 AM    POTASSIUM 4.6 03/25/2022 02:38 AM    CHLORIDE 104 03/25/2022 02:38 AM    CO2 25 03/25/2022 02:38 AM    GLUCOSE 91 03/25/2022 02:38 AM    BUN 41 (H) 03/25/2022 02:38 AM    CREATININE 1.84 (H) 03/25/2022 02:38 AM    CREATININE 1.1 02/29/2008 09:24 AM      Lab Results   Component Value Date/Time    WBC 5.7 03/25/2022 02:38 AM    RBC 2.77 (L) 03/25/2022 02:38 AM    HEMOGLOBIN 8.1 (L) 03/25/2022 02:38 AM    HEMATOCRIT 25.6 (L) 03/25/2022 02:38 AM    MCV 92.4 03/25/2022 02:38 AM    MCH 29.2 03/25/2022 02:38 AM    MCHC 31.6 (L) 03/25/2022 02:38 AM    MPV 13.9 (H) 03/25/2022 02:38 AM    NEUTSPOLYS 71.00 03/25/2022 02:38 AM    LYMPHOCYTES 9.80 (L) 03/25/2022 02:38 AM    MONOCYTES 10.20 03/25/2022 02:38 AM    EOSINOPHILS 7.40 (H) 03/25/2022 02:38 AM    BASOPHILS 0.50 03/25/2022 02:38 AM        Lab Results   Component Value Date/Time    PROTHROMBTM 14.5 03/24/2022 10:25 AM    INR 1.16 (H) 03/24/2022 10:25 AM      Recent Labs     03/21/22  0700 03/23/22  1626 03/24/22  1025   ASTSGOT 20 21 15   ALTSGPT 13 18 13   TBILIRUBIN 0.4 0.4 0.5   GLOBULIN 2.6 1.7* 2.4   INR 1.21*  --  1.16*          Problem List Items Addressed This Visit     Anemia     Significant anemia with 6.7, 14 baseline.  Has been treated with dexamethasone for covid on January  High risk of GI bleed.  BUN and creatinine high.  Transfuse goal <7, check HnH.           Other Visit Diagnoses     Acute congestive heart failure, unspecified heart failure type (HCC)        Relevant Medications    hydrALAZINE (APRESOLINE) injection 10 mg    atorvastatin (LIPITOR) tablet 40 mg    furosemide (LASIX) tablet 40 mg    Aortic valve stenosis, etiology of cardiac valve disease unspecified        Relevant Medications    hydrALAZINE (APRESOLINE) injection 10 mg    atorvastatin (LIPITOR) tablet 40 mg    furosemide (LASIX) tablet 40 mg    Gastrointestinal hemorrhage, unspecified gastrointestinal hemorrhage  type        Peripheral vascular disease (HCC)        Relevant Medications    hydrALAZINE (APRESOLINE) injection 10 mg    atorvastatin (LIPITOR) tablet 40 mg    furosemide (LASIX) tablet 40 mg           ASSESSMENT: 87 yo male with significant cardiac disease and worsening anemia. He needs evaluation of the GI tract prior to consideration of valvular repair.      PLAN:   1) Clear liquid diet and NPO after midnight  2) Golytely prep  3) plan for EGD and colonoscopy tomorrow to evaluate the source of GI bleeding      Thank you for this consult.     Dakota Gutierrez MD

## 2022-03-25 NOTE — THERAPY
"Occupational Therapy   Initial Evaluation     Patient Name: Delonte Alexander  Age:  86 y.o., Sex:  male  Medical Record #: [id number]  Today's Date: 3/25/2022     Precautions  Precautions: (P) Fall Risk    Assessment  Patient is 86 y.o. male who presents to acute after undergoing cardiac cath earlier this week w/ SOB, and fatigue when ambulating. Pt found to have GI bleed that required transfusion. Pt reports he lives w/ Dtr who assists w/ IADLs. Pt demo'd impaired balance, functional mobility, and activity tolerance impacting functional independence. Will continue to follow.     Plan    Recommend Occupational Therapy 3 times per week until therapy goals are met for the following treatments:  Adaptive Equipment, Neuro Re-Education / Balance, Self Care/Activities of Daily Living, Therapeutic Activities, and Therapeutic Exercises.    DC Equipment Recommendations: (P) None  Discharge Recommendations: (P) Recommend home health for continued occupational therapy services     Subjective    \"They tell me I need to get rid of fluid, then they tell me to drink water\"     Objective       03/25/22 1524   Total Time Spent   Total Time Spent (Mins) 25   Charge Group   OT Evaluation OT Evaluation Low   Initial Contact Note    Initial Contact Note Order Received and Verified, Occupational Therapy Evaluation in Progress with Full Report to Follow.   Prior Living Situation   Prior Services None   Housing / Facility 1 Story House   Bathroom Set up Walk In Shower;Shower Chair;Grab Bars   Equipment Owned 4-Wheel Walker;Wheelchair;Tub / Shower Seat   Lives with - Patient's Self Care Capacity Adult Children   Comments Pt lives w/ dtr who provides assist w/ IADLs   Prior Level of ADL Function   Self Feeding Independent   Grooming / Hygiene Independent   Bathing Independent   Dressing Independent   Toileting Independent   Prior Level of IADL Function   Laundry Requires Assist   Home Management Requires Assist   Shopping Requires Assist "   Prior Level Of Mobility Uses Wheel Chair for Community Mobility;Independent With Device in Home   Precautions   Precautions Fall Risk   Vitals   O2 (LPM) 2   O2 Delivery Device Silicone Nasal Cannula   Pain 0 - 10 Group   Therapist Pain Assessment Post Activity Pain Same as Prior to Activity;Nurse Notified  (no c/o pain during session)   Cognition    Cognition / Consciousness X   Speech/ Communication Hard of Hearing   Level of Consciousness Alert   Comments pleasent and cooperative   Active ROM Upper Body   Comments WFL   Strength Upper Body   Comments WFL   Balance Assessment   Sitting Balance (Static) Fair +   Sitting Balance (Dynamic) Fair   Standing Balance (Static) Fair -   Standing Balance (Dynamic) Fair -   Weight Shift Sitting Fair   Weight Shift Standing Fair   Comments w/ FWW   Bed Mobility    Supine to Sit Supervised  (using bed features)   Sit to Supine   (NT in chair post)   Scooting Supervised   ADL Assessment   Grooming Standby Assist;Standing  (washed hands)   Upper Body Dressing Minimal Assist  (gown)   Lower Body Dressing Moderate Assist   Toileting Standby Assist  (BM in toilet)   How much help from another person does the patient currently need...   Putting on and taking off regular lower body clothing? 2   Bathing (including washing, rinsing, and drying)? 3   Toileting, which includes using a toilet, bedpan, or urinal? 3   Putting on and taking off regular upper body clothing? 3   Taking care of personal grooming such as brushing teeth? 3   Eating meals? 4   6 Clicks Daily Activity Score 18   Functional Mobility   Sit to Stand Standby Assist   Bed, Chair, Wheelchair Transfer Standby Assist   Toilet Transfers Standby Assist   Mobility within room and bathroom w/ FWW   Activity Tolerance   Sitting in Chair 10+ min (up post)   Sitting Edge of Bed 5 min   Standing 7 min total   Patient / Family Goals   Patient / Family Goal #1 For his legs to stop hurting   Short Term Goals   Short Term Goal # 1  Pt will demo LB dressing w/ SPV   Short Term Goal # 2 Pt will demo standing grooming w/ SPV   Education Group   Role of Occupational Therapist Patient Response Patient;Acceptance;Explanation;Demonstration;Verbal Demonstration;Action Demonstration   Problem List   Problem List Decreased Active Daily Living Skills;Decreased Homemaking Skills;Decreased Functional Mobility;Decreased Activity Tolerance;Impaired Postural Control / Balance   Anticipated Discharge Equipment and Recommendations   DC Equipment Recommendations None   Discharge Recommendations Recommend home health for continued occupational therapy services   Interdisciplinary Plan of Care Collaboration   IDT Collaboration with  Nursing   Patient Position at End of Therapy Call Light within Reach;Tray Table within Reach;Phone within Reach;Seated;Chair Alarm On   Collaboration Comments report given   Session Information   Date / Session Number  3/25, 1 (1/3, 3/31)   Priority 2

## 2022-03-25 NOTE — THERAPY
"Physical Therapy   Initial Evaluation     Patient Name: Delonte Alexander  Age:  86 y.o., Sex:  male  Medical Record #: [id number]  Today's Date: 3/25/2022     Precautions  Precautions: Fall Risk  Comments: [facility]    Assessment  Patient is 86 y.o. male admitted with hypoxia, and Leg pain. Pt s/p stent 3/21 with B groin hematoma. Also found to have GI bleed requiring transfusion, planned EGD and colonoscopy tomorrow. PMHx of CAD, stenosis, CKD. Pt required SPV for all mobility including ambulation 75ft with FWW, pt limited 2/2 B leg pain and fatigue. Pt dest with ambulation, required few minutes to return (see vital details below). Pt's dtr lives with him and has 2 VAMSI, uses 4WW for household distances and WC for appointments. Recommend home with HH, once pt able to demo negotiation of 2 VAMSI home safely, anticipate quick progression. Will continue to follow.     Plan    Recommend Physical Therapy 3 times per week until therapy goals are met for the following treatments:  Bed Mobility, Equipment, Gait Training, Manual Therapy, Neuro Re-Education / Balance, Self Care/Home Evaluation, Stair Training, Therapeutic Activities, and Therapeutic Exercises    DC Equipment Recommendations: None (has FWW and WC)  Discharge Recommendations: Recommend home health for continued physical therapy services (once able to negotiate 2 VAMSI home)       Subjective    \"I don't breathe through my nose when I'm physically stressed.\"      Objective     03/25/22 1527   Total Time Spent   Total Time Spent (Mins) 26   Charge Group   PT Evaluation PT Evaluation Mod   PT Self Care / Home Evaluation  1  (8 min)   Initial Contact Note    Initial Contact Note Order Received and Verified, Physical Therapy Evaluation in Progress with Full Report to Follow.   Precautions   Precautions Fall Risk   Comments [facility]   Vitals   O2 (LPM) 2   O2 Delivery Device Silicone Nasal Cannula   Vitals Comments Pt desat to 78% on 2L with ambulation, required 3 min seated rest to " return to >88%   Pain 0 - 10 Group   Therapist Pain Assessment Post Activity Pain Same as Prior to Activity;Nurse Notified  (c/o back pain not rated, agreeable to mobility)   Prior Living Situation   Prior Services Home-Independent   Housing / Facility 1 Story House   Steps Into Home 2   Rail Left Rail  (Steps into Home)   Bathroom Set up Walk In Shower;Grab Bars;Shower Chair   Equipment Owned Front-Wheel Walker;Wheelchair   Lives with - Patient's Self Care Capacity Adult Children  (dtr)   Comments Pt reports he does not leave the house unless he needs to go to an appointment   Prior Level of Functional Mobility   Bed Mobility Independent   Transfer Status Independent   Ambulation Independent   Distance Ambulation (Feet)   (household)   Assistive Devices Used 4-Wheel Walker   Wheelchair Independent  (uses in community)   Stairs Independent   Cognition    Cognition / Consciousness WDL   Level of Consciousness Alert   Comments Pleasant and cooperative. [place]   Active ROM Lower Body    Active ROM Lower Body  WDL   Strength Lower Body   Lower Body Strength  WDL   Strength Upper Body   Upper Body Strength  WDL   Coordination Upper Body   Coordination WDL   Coordination Lower Body    Coordination Lower Body  WDL   Balance Assessment   Sitting Balance (Static) Fair +   Sitting Balance (Dynamic) Fair   Standing Balance (Static) Fair   Standing Balance (Dynamic) Fair -   Weight Shift Sitting Fair   Weight Shift Standing Fair   Comments w/ FWW   Gait Analysis   Gait Level Of Assist Supervised   Assistive Device Front Wheel Walker   Distance (Feet) 75   # of Times Distance was Traveled 1   Deviation Shuffled Gait;Bradykinetic  (kyphotic posture)   Weight Bearing Status no restrictions   Comments Limited gait distance 2/2 BLE pain   Bed Mobility    Supine to Sit Supervised   Sit to Supine   (NT, up in chair post)   Scooting Supervised   Functional Mobility   Sit to Stand Supervised   Bed, Chair, Wheelchair Transfer Supervised    Transfer Method Stand Step   Mobility w/ FWW in hallway   How much difficulty does the patient currently have...   Turning over in bed (including adjusting bedclothes, sheets and blankets)? 3   Sitting down on and standing up from a chair with arms (e.g., wheelchair, bedside commode, etc.) 3   Moving from lying on back to sitting on the side of the bed? 3   How much help from another person does the patient currently need...   Moving to and from a bed to a chair (including a wheelchair)? 3   Need to walk in a hospital room? 3   Climbing 3-5 steps with a railing? 3   6 clicks Mobility Score 18   Activity Tolerance   Sitting in Chair >5 min, up post   Sitting Edge of Bed 3 min   Standing 8-10 min   Comments limited 2/2 fatigue, BLE pain   Short Term Goals    Short Term Goal # 1 Pt will ambulate 150ft with FWW and SPV in 6 visits to return to PLOF   Short Term Goal # 2 Pt will negotiate 2 steps with BUE support and SPV in 6 visits to safely enter/exit home   Education Group   Education Provided Role of Physical Therapist;Gait Training;Use of Assistive Device  (pursed lip breathing, importance of OOB mobility, activity pacing)   Role of Physical Therapist Patient Response Patient;Acceptance;Explanation;Verbal Demonstration   Gait Training Patient Response Patient;Acceptance;Explanation;Action Demonstration   Use of Assistive Device Patient Response Patient;Acceptance;Explanation;Action Demonstration   Problem List    Problems Pain;Impaired Ambulation;Impaired Balance;Decreased Activity Tolerance   Anticipated Discharge Equipment and Recommendations   DC Equipment Recommendations None  (has FWW and WC)   Discharge Recommendations Recommend home health for continued physical therapy services  (once able to negotiate 2 VAMSI home)   Interdisciplinary Plan of Care Collaboration   IDT Collaboration with  Nursing;Occupational Therapist   Patient Position at End of Therapy Seated;Chair Alarm On;Tray Table within Reach;Phone  within Reach;Family / Friend in Room   Collaboration Comments RN updated   Session Information   Date / Session Number  3/25 1 (1/3, 3/31)   Priority 3  (further amb, 2 steps)

## 2022-03-25 NOTE — PROGRESS NOTES
Assumed care of patient. Bedside report received from Gini STEVENSON. Updated POC, call light within reach, fall precautions in place. Bed locked, and in lowest position. Pt is A&O x4, states 0/10 pain. Patient instructed to call for assistance. All questions answered, no further needs at this time.

## 2022-03-25 NOTE — HEART FAILURE PROGRAM
ERP diagnosed heart failure. No other providers, including attendings and cardiology are diagnosing heart failure, they are diagnosing volume overload related to anemia.    Because there is not a HF dx at this time, it does not make sense to do the checklist of appointment.  Thank you, Mirlande Cardio RN Navigator [phone]

## 2022-03-25 NOTE — PROGRESS NOTES
Daily Progress Note:     Date of Service: 3/25/2022  Primary Team: [facility] White Team   Attending: Evangelista Donovan M.D.   Senior Resident: Dr. Yusuf  Intern: Yareli Mccormick M.D.   Contact:  [phone]    ID:  87 yo m with severe AS, 3v CAD s/p PCI with 4 stents placed 3/21/2022 c/b bilateral groin hematomas managed perioperatively percutaneously, h/o COVID in January 2022 now on 2-3 L O2 at baseline presented with symptomatic acute on chronic anemia and guaiac positive stools.     IE:  S/p 3 units PRBC  Net neg 3L since admission    Subjective  Patient reports no improvement in dyspnea after diuresis.   Still complaining of sob though continues saturate well on home O2.       Denies chest pain, abdomial pain, n/v.     Consultants/Specialty:  GI  Cardiology    Review of Systems:    Review of Systems   Constitutional: Negative for chills, fever and weight loss.   HENT: Negative for congestion and sore throat.    Eyes: Negative for blurred vision.   Respiratory: Positive for shortness of breath. Negative for cough.    Cardiovascular: Negative for chest pain, palpitations and leg swelling.   Gastrointestinal: Negative for abdominal pain, nausea and vomiting.   Neurological: Negative for dizziness and weakness.   Psychiatric/Behavioral: Negative for depression.       Objective Data:   Physical Exam:   Vitals:   Temp:  [36.4 °C (97.5 °F)-37 °C (98.6 °F)] 37 °C (98.6 °F)  Pulse:  [64-75] 75  Resp:  [14-18] 16  BP: (107-141)/(52-70) 110/60  SpO2:  [90 %-96 %] 96 %    Physical Exam  Constitutional:       General: He is not in acute distress.  HENT:      Head: Normocephalic and atraumatic.      Mouth/Throat:      Mouth: Mucous membranes are moist.   Eyes:      General: No scleral icterus.     Extraocular Movements: Extraocular movements intact.      Conjunctiva/sclera: Conjunctivae normal.   Cardiovascular:      Rate and Rhythm: Normal rate and regular rhythm.      Pulses: Normal pulses.      Heart sounds: Murmur heard.    Pulmonary:      Effort: Pulmonary effort is normal. No respiratory distress.      Breath sounds: Rhonchi present.   Abdominal:      General: Abdomen is flat. Bowel sounds are normal. There is no distension.      Palpations: Abdomen is soft.      Tenderness: There is no abdominal tenderness.   Musculoskeletal:         General: No swelling. Normal range of motion.      Cervical back: Normal range of motion.      Comments: Trace bilateral edema   Skin:     General: Skin is warm and dry.   Neurological:      General: No focal deficit present.      Mental Status: He is alert and oriented to person, place, and time.   Psychiatric:         Mood and Affect: Mood normal.         Behavior: Behavior normal.         Thought Content: Thought content normal.         Judgment: Judgment normal.           Labs:   Recent Labs     03/23/22 1626 03/24/22  1025 03/24/22  2147 03/25/22  0238 03/25/22  1019   WBC 8.0 7.0  --  5.7  --    RBC 2.45* 2.29*  --  2.77*  --    HEMOGLOBIN 7.1* 6.7* 7.2* 8.1* 9.7*   HEMATOCRIT 23.0* 21.5*  --  25.6*  --    MCV 93.9 93.9  --  92.4  --    MCH 29.0 29.3  --  29.2  --    RDW 50.3* 49.7  --  48.6  --    PLATELETCT 101* 98*  --  95*  --    MPV 14.1* 13.8*  --  13.9*  --    NEUTSPOLYS  --  84.10*  --  71.00  --    LYMPHOCYTES  --  4.90*  --  9.80*  --    MONOCYTES  --  7.00  --  10.20  --    EOSINOPHILS  --  2.90  --  7.40*  --    BASOPHILS  --  0.40  --  0.50  --      Recent Labs     03/23/22 1626 03/24/22  1025 03/25/22  0238   SODIUM 137 139 139   POTASSIUM 4.8 4.6 4.6   CHLORIDE 100 101 104   CO2 25 27 25   GLUCOSE 117* 101* 91   BUN 42* 42* 41*     Recent Labs     03/23/22 1626 03/24/22  1025 03/25/22  0238   ALBUMIN 3.9 3.6  --    TBILIRUBIN 0.4 0.5  --    ALKPHOSPHAT 61 58  --    TOTPROTEIN 5.6* 6.0  --    ALTSGPT 18 13  --    ASTSGOT 21 15  --    CREATININE 1.91* 1.81* 1.84*         Imaging:   US-EXTREMITY VENOUS LOWER BILAT   Final Result      EC-ECHOCARDIOGRAM COMPLETE W/O CONT   Final  Result      CT-ABDOMEN-PELVIS W/O   Final Result      1.  Noncontrast CT evidence of abdominopelvic bleeding. Specifically, no retroperitoneal hematoma.   2.  Chronic wall thickening of the anterior infrarenal aorta, possibly an old intramural hematoma. It was present on the CT obtained prior to percutaneous coronary intervention.   3.  Small left effusion and associated atelectasis.   4.  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   5.  Bilateral nonobstructing nephrolithiasis.   6.  Findings of anemia.            DX-CHEST-LIMITED (1 VIEW)   Final Result      1.  Bilateral airspace opacities most prominent in the mid to lower lung zones may represent pulmonary edema or multifocal pneumonia.   2.  Small bilateral pleural effusions with overlying atelectasis/consolidation.   3.  Cardiomegaly. Correlation with echocardiogram is recommended as a pericardial effusion is not excluded.   4.  Calcified pleural plaques can be seen as a sequela of prior asbestos exposure.             Problem Representation:   85 yo m with severe AS, 3v CAD s/p PCI with 4 stents placed 3/21/2022 c/b bilateral groin hematomas managed perioperatively percutaneously, h/o COVID in January 2022 now on 2-3 L O2 at baseline presented with symptomatic acute on chronic anemia and guaiac positive stools planning for EGD and colonoscopy.      * Anemia- (present on admission)  Assessment & Plan  Significant anemia with 6.7, 14 baseline.  Likely 2/2 to GI bleed with positive FIT possibly due to recent steroid therapy  BUN and creatinine high.  S/p 3 units PRBC  Plt 95  TEG study benign  Ferritin 457  Fe 25  B12/TSH wnl    -GI planning to scope 3/26  -Transfuse goal <9\  -continue ASA/Plavix     GI bleed  Assessment & Plan  Suspicion for upper gi bleed possible 2/2 to recent steroid therapy for COVID  Also can be Aortic stenosis related Heyde's syndrome   Significant anemia with 6.7, 14 baseline.  FIT positive  CT Abd/pelv negative  for retroperitoneal bleed   GI following, plan for EGD and colonoscopy 3/26.    -CLD and bowel prep  -NPO at midnight for scopes tomorrow  -continue IV PPI 40 BID  -transfuse Hgb <9 per cardiology recs  -continue ASA, plavix as just had recent stents        Lower extremity pain, bilateral  Assessment & Plan  Bilateral venous doppler negative for DVT  Concern for arterial pathology given recent PCI procedure  No signs of cellulitis.  Weak distal pulses though feet appear well perfused     -Arterial US to     Nonrheumatic aortic valve stenosis- (present on admission)  Assessment & Plan  Was planned for TAVR, had recent angio and received 4 stents on 3/21/2022.  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  Cardiology following    -appreciated card recs    Acute respiratory failure with hypoxemia (HCC)- (present on admission)  Assessment & Plan  Resolved.  Patient is on baseline O2 requirement of 2-3L since COVID infection in January 2022  Dyspnea likely related so severe AS      CAD, multiple vessel  Assessment & Plan  S/p PCI with 4 stents on 3/21/2022  Cardiology following    -continue ASA/plavix    Elevated troponin  Assessment & Plan  Elevated 321  Trend trops  No chest pain  No signfiicant ischemia on ekg  Likely demand due to volume overload.  Cardiology following    Acute kidney failure (HCC)  Assessment & Plan  Likely cardiorenal and GI bleed.  Transfusion for severe anemia.  Bladder scan  Urinalysis.  Continue to monitor    Volume overload- (present on admission)  Assessment & Plan  Resolved  S/p IV lasix with net neg 3L.  Likely due to Severe aortic stenosis  Has been compliant with lasix  CXR consistent with bilateral pleural effusion and pulmonary edema  BNP 7k  No chest pain  No need for TTE given recent cathetherization.    Thrombocytopenia (HCC)- (present on admission)  Assessment & Plan  Unclear etiology.  Transfuse platelets if plt <50, given potential active bleeding.    COVID-  (present on admission)  Assessment & Plan  January 2022, was admitted for covid and received dexamethasone  Test positive, however no significant signs of active infection  No dexa due to concerning GI bleed.  No isolation indicated at this time.

## 2022-03-25 NOTE — CARE PLAN
The patient is Stable - Low risk of patient condition declining or worsening    Shift Goals  Clinical Goals: Hemodynmic stability, monitor labs & H&H  Patient Goals: Rest/Sleep      Problem: Knowledge Deficit - Standard  Goal: Patient and family/care givers will demonstrate understanding of plan of care, disease process/condition, diagnostic tests and medications  Outcome: Progressing  Note: Pt educated on the POC, verbalizes understanding.     Problem: Pain - Standard  Goal: Alleviation of pain or a reduction in pain to the patient’s comfort goal  Outcome: Progressing  Note: Using rest, and pharmacologic methods for pain reduction.      Problem: Hemodynamics  Goal: Patient's hemodynamics, fluid balance and neurologic status will be stable or improve  Outcome: Progressing  Note: Monitoring of H&H, blood transfusion as ordered.        Progress made toward(s) clinical / shift goals:  Progressing

## 2022-03-26 NOTE — ANESTHESIA PREPROCEDURE EVALUATION
Case: [id number] Date/Time: 03/26/22 0715    Procedures:       GASTROSCOPY      COLONOSCOPY    Location: [place] [address] / SURGERY [facility]    Surgeons: Dakota Gutierrez M.D.          Relevant Problems   PULMONARY   (positive) Secondary bacterial pneumonia      CARDIAC   (positive) CAD, multiple vessel   (positive) Coronary artery disease involving native coronary artery of native heart without angina pectoris   (positive) First degree AV block   (positive) LBBB (left bundle branch block)   (positive) Nonrheumatic aortic valve stenosis   (positive) PAD (peripheral artery disease) (HCC)         (positive) Acute kidney failure (HCC)   (positive) Renal calculus   (positive) Renal insufficiency       Physical Exam    Airway   Mallampati: II  TM distance: >3 FB  Neck ROM: full       Cardiovascular - normal exam  Rhythm: regular  Rate: normal  (-) murmur     Dental - normal exam           Pulmonary - normal exam  Breath sounds clear to auscultation  (+) rhonchi, decreased breath sounds, wheezes     Abdominal    Neurological - normal exam                 Anesthesia Plan    ASA 3   ASA physical status 3 criteria: COPD and hypertension - poorly controlled    Plan - general       Airway plan will be natural airway          Induction: intravenous    Postoperative Plan: Postoperative administration of opioids is intended.    Pertinent diagnostic labs and testing reviewed    Informed Consent:    Anesthetic plan and risks discussed with patient.    Use of blood products discussed with: patient whom consented to blood products.

## 2022-03-26 NOTE — OP REPORT
DATE OF SERVICE:  03/26/2022     INDICATION FOR PROCEDURE:  anemia, +FOBT     PROCEDURE PERFORMED: Colonoscopy with polypectomy and EGD with biopsies     CONSENT:  Informed consent was obtained directly from the patient after benefits, risks and possible alternatives were discussed.     MEDICATIONS:  The patient was given Propofol for this procedure. Please see the anesthesia record for details    PROCEDURE DESCRIPTION:  The patient was placed in the left lateral decubitus position and provided with supplemental oxygen via nasal cannula.  Vital signs were monitored continuously throughout the procedure.  When ready, the upper endoscope was placed in the patient's mouth and advanced easily and carefully to the esophagus and subsequently to the stomach and duodenum.The scope was slowly withdrawn and mucosa carefully examined. Retroflexion was performed in the stomach. The patients toleration of this procedure was excellent.    After completion of the EGD the scope was changed and the colonoscopy was started. FIFI was performed. The colonoscope was then introduced into the rectum through the anus. The colonoscope was advanced through the colon under direct visualization to the cecum. The scope was then withdrawn and mucosa examined. Retroflexion was performed in the rectum. The patients toleration of this procedure was excellent. The prep was excellent as well.      FINDINGS:    Esophagus: normal    Stomach: mild antral erythema. Biopsies taken to r/o H.pylori    Duodenum: normal    Colon:  two (5-6mm) sessile polyps were found in the transverse colon and were removed by hot snare  - two (5-8mm) sessile polyps were found in the sigmoid colon and were removed with hot and cold snare  - minimal diverticulosis  - medium sized hemorrhoids        COMPLICATIONS:  No complications or blood loss during or in the immediate postoperative period.     IMPRESSION:  1.  mild gastritis  2.  Colon polyps  3.  Diverticulosis  4.   Hemorrhoids     RECOMMENDATION:    1) f/u pathology  2) Suspect acute blood loss was related to post-cath bleeding  3) No further GI workup  4) No need for further colonoscopies    Please call [facility] if new questions. We will sign off

## 2022-03-26 NOTE — CARE PLAN
The patient is Stable - Low risk of patient condition declining or worsening    Shift Goals  Clinical Goals: Bowel Prep for colonoscopy  Patient Goals: Rest      Problem: Care Map:  Day 2 Optimal Outcome for the Heart Failure Patient  Goal: Day 2:  Optimal Care of the heart failure patient  Outcome: Progressing     Problem: Knowledge Deficit - Standard  Goal: Patient and family/care givers will demonstrate understanding of plan of care, disease process/condition, diagnostic tests and medications  Outcome: Progressing  Note: Pt educated on the POC, verbalizes understanding.        Progress made toward(s) clinical / shift goals:  Progressing

## 2022-03-26 NOTE — PROGRESS NOTES
Daily Progress Note:     Date of Service: 3/26/2022  Primary Team: [facility] White Team   Attending: Evangelista Donovan M.D.   Senior Resident: Dr. Yusuf  Intern: Yareli Mccormick M.D.   Contact:  [phone]    ID:  85 yo m with severe AS, 3v CAD s/p PCI with 4 stents placed 3/21/2022 c/b bilateral groin hematomas managed perioperatively percutaneously, h/o COVID in January 2022 now on 2-3 L O2 at baseline presented with symptomatic acute on chronic anemia and guaiac positive stools.     IE:  S/p 3 units PRBC  Went for EGD/colonoscopy this morning- no active bleed. Had 2 polyps in transverse colon and 2 in sigmoid colon, path pending.     Subjective  Patient reports no improvement in dyspnea after diuresis.   Saturating well on home O2.   Reports feeling well.     Denies chest pain, abdomial pain, n/v.     Consultants/Specialty:  GI  Cardiology    Review of Systems:    Review of Systems   Constitutional: Negative for chills, fever and weight loss.   HENT: Negative for congestion and sore throat.    Eyes: Negative for blurred vision.   Respiratory: Positive for shortness of breath. Negative for cough.    Cardiovascular: Negative for chest pain, palpitations and leg swelling.   Gastrointestinal: Negative for abdominal pain, nausea and vomiting.   Neurological: Negative for dizziness and weakness.   Psychiatric/Behavioral: Negative for depression.       Objective Data:   Physical Exam:   Vitals:   Temp:  [36.3 °C (97.4 °F)-36.8 °C (98.2 °F)] 36.5 °C (97.7 °F)  Pulse:  [] 72  Resp:  [18-28] 18  BP: ()/(49-84) 115/53  SpO2:  [90 %-99 %] 93 %    Physical Exam  Constitutional:       General: He is not in acute distress.  HENT:      Head: Normocephalic and atraumatic.      Mouth/Throat:      Mouth: Mucous membranes are moist.   Eyes:      General: No scleral icterus.     Extraocular Movements: Extraocular movements intact.      Conjunctiva/sclera: Conjunctivae normal.   Cardiovascular:      Rate and Rhythm: Normal  rate and regular rhythm.      Pulses: Normal pulses.      Heart sounds: Murmur heard.   Pulmonary:      Effort: Pulmonary effort is normal. No respiratory distress.      Breath sounds: Rhonchi present.   Abdominal:      General: Abdomen is flat. Bowel sounds are normal. There is no distension.      Palpations: Abdomen is soft.      Tenderness: There is no abdominal tenderness.   Musculoskeletal:         General: No swelling. Normal range of motion.      Cervical back: Normal range of motion.      Comments: Trace bilateral edema   Skin:     General: Skin is warm and dry.   Neurological:      General: No focal deficit present.      Mental Status: He is alert and oriented to person, place, and time.   Psychiatric:         Mood and Affect: Mood normal.         Behavior: Behavior normal.         Thought Content: Thought content normal.         Judgment: Judgment normal.           Labs:   Recent Labs     03/24/22  1025 03/24/22  2147 03/25/22  0238 03/25/22  1019 03/26/22  0109   WBC 7.0  --  5.7  --  9.7   RBC 2.29*  --  2.77*  --  3.45*   HEMOGLOBIN 6.7*   < > 8.1* 9.7* 10.0*   HEMATOCRIT 21.5*  --  25.6*  --  30.8*   MCV 93.9  --  92.4  --  89.3   MCH 29.3  --  29.2  --  29.0   RDW 49.7  --  48.6  --  47.8   PLATELETCT 98*  --  95*  --  123*   MPV 13.8*  --  13.9*  --  13.6*   NEUTSPOLYS 84.10*  --  71.00  --   --    LYMPHOCYTES 4.90*  --  9.80*  --   --    MONOCYTES 7.00  --  10.20  --   --    EOSINOPHILS 2.90  --  7.40*  --   --    BASOPHILS 0.40  --  0.50  --   --     < > = values in this interval not displayed.     Recent Labs     03/24/22  1025 03/25/22  0238   SODIUM 139 139   POTASSIUM 4.6 4.6   CHLORIDE 101 104   CO2 27 25   GLUCOSE 101* 91   BUN 42* 41*     Recent Labs     03/24/22  1025 03/25/22  0238   ALBUMIN 3.6  --    TBILIRUBIN 0.5  --    ALKPHOSPHAT 58  --    TOTPROTEIN 6.0  --    ALTSGPT 13  --    ASTSGOT 15  --    CREATININE 1.81* 1.84*         Imaging:   US-EXTREMITY VENOUS LOWER BILAT   Final Result       EC-ECHOCARDIOGRAM COMPLETE W/O CONT   Final Result      CT-ABDOMEN-PELVIS W/O   Final Result      1.  Noncontrast CT evidence of abdominopelvic bleeding. Specifically, no retroperitoneal hematoma.   2.  Chronic wall thickening of the anterior infrarenal aorta, possibly an old intramural hematoma. It was present on the CT obtained prior to percutaneous coronary intervention.   3.  Small left effusion and associated atelectasis.   4.  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   5.  Bilateral nonobstructing nephrolithiasis.   6.  Findings of anemia.            DX-CHEST-LIMITED (1 VIEW)   Final Result      1.  Bilateral airspace opacities most prominent in the mid to lower lung zones may represent pulmonary edema or multifocal pneumonia.   2.  Small bilateral pleural effusions with overlying atelectasis/consolidation.   3.  Cardiomegaly. Correlation with echocardiogram is recommended as a pericardial effusion is not excluded.   4.  Calcified pleural plaques can be seen as a sequela of prior asbestos exposure.         US-EXTREMITY ARTERY LOWER UNILAT RIGHT    (Results Pending)       Problem Representation:   87 yo m with severe AS, 3v CAD s/p PCI with 4 stents placed 3/21/2022 c/b bilateral groin hematomas managed perioperatively percutaneously, h/o COVID in January 2022 now on 2-3 L O2 at baseline presented with symptomatic acute on chronic anemia likely 2/2 to recent cath procedure, s/p EGD/colonscopy with no evidence of acute bleed.    * Anemia- (present on admission)  Assessment & Plan  Significant anemia with 6.7, 14 baseline.  Likely 2/2 to recent cath procedure 3/21 that was c/b bilateral groin hematomas.  GI bleed r/o- s/p EGD/colonscopy 3/26 with no sign of active bleed  S/p 3 units PRBC  Plt 95  TEG study benign  Ferritin 457  Fe 25  B12/TSH wnl  Hgb stable   HD stable  Iliofemoral arterial doppler pending    -f/u arterial US to rule out pseudoaneurysm  -continue ASA/Plavix     GI  bleed  Assessment & Plan  FIT positive  Suspicion for UGIB possible 2/2 to recent steroid therapy for COVID  S/p EGD/colonscopy 3/26- negative for active bleed    -transition PPI to 40 mg PO  -continue ASA, plavix as just had recent stents        Lower extremity pain, bilateral  Assessment & Plan  Bilateral venous doppler negative for DVT  Concern for arterial pathology given recent PCI procedure  No signs of cellulitis.  Weak distal pulses though feet appear well perfused     -Arterial US pending    Nonrheumatic aortic valve stenosis- (present on admission)  Assessment & Plan  Was planned for TAVR, had recent angio and received 4 stents on 3/21/2022.  BP control.  Cardiology following    -appreciate card recs    Acute respiratory failure with hypoxemia (HCC)- (present on admission)  Assessment & Plan  Resolved.  Patient is on baseline O2 requirement of 2-3L since COVID infection in January 2022  Dyspnea likely related so severe AS      CAD, multiple vessel  Assessment & Plan  S/p PCI with 4 stents on 3/21/2022 c/b bilateral groin hematomas  Cardiology following    -f/u arterial US as above  -continue ASA/plavix    Elevated troponin  Assessment & Plan  Elevated 321  Trend trops  No chest pain  No signfiicant ischemia on ekg  Likely demand due to volume overload.  Cardiology following    Acute kidney failure (HCC)  Assessment & Plan  Likely cardiorenal and GI bleed.  Transfusion for severe anemia.  Bladder scan  Urinalysis.  Continue to monitor    Volume overload- (present on admission)  Assessment & Plan  Resolved  S/p IV lasix with net neg 3L.  Likely due to Severe aortic stenosis  Has been compliant with lasix  CXR consistent with bilateral pleural effusion and pulmonary edema  BNP 7k  No chest pain  No need for TTE given recent cathetherization.    Thrombocytopenia (HCC)- (present on admission)  Assessment & Plan  Unclear etiology.  TEG normal    -transfuse for plt <50    COVID- (present on admission)  Assessment  & Plan  January 2022, was admitted for covid and received dexamethasone  Test positive, however no significant signs of active infection  No dexa due to concerning GI bleed.  No isolation indicated at this time.

## 2022-03-26 NOTE — ASSESSMENT & PLAN NOTE
S/p PCI with 4 stents on 3/21/2022 c/b bilateral groin hematomas    -outpatient follow up with cardiology  -continue ASA/plavix

## 2022-03-26 NOTE — RESPIRATORY CARE
COPD EDUCATION by COPD CLINICAL EDUCATOR  3/26/2022 at 8:26 AM by Rain Delgado, RRT     Patient reviewed by COPD education team. Patient does not have a history or diagnosis of COPD and is a non-smoker.  Therefore, patient does not qualify for the COPD program.

## 2022-03-26 NOTE — PROGRESS NOTES
Interval History:  86-year-old male with multivessel CAD and severe AS status post Impella assisted left main PCI 3/21/2022 presents with symptomatic anemia and bilateral leg pain.  3/25: No vascular or cardiovascular imaging was completed as recommended yesterday.  Patient feels unchanged.  Hemoglobin remains under goal of 9.  Pending gastroenterology consultation.  3/26: Gastroenterology input appreciated, colonoscopy without any source of acute GI bleeding.  Hemoglobin remained stable and is now above 9.  Patient is feeling improved.      Physical Exam   Blood pressure 116/49, pulse 71, temperature 36.3 °C (97.4 °F), temperature source Temporal, resp. rate 18, height 1.829 m (6'), weight 82.4 kg (181 lb 10.5 oz), SpO2 90 %.    Constitutional: Appears well-developed.   HENT: Normocephalic and atraumatic. No scleral icterus.   Neck: No JVD present.   Cardiovascular: Normal rate. Exam reveals no gallop and no friction rub. No murmur heard.   Pulmonary/Chest: CTAB    Abdominal: S/NT/ND BS+   Musculoskeletal: Exhibits no edema. Pulses present.  Skin: Skin is warm and dry.     ROS: As HPI other reviewed and negative       Intake/Output Summary (Last 24 hours) at 3/26/2022 1128  Last data filed at 3/26/2022 0847  Gross per 24 hour   Intake 4381 ml   Output 3273 ml   Net 1108 ml       Recent Labs     03/24/22  1025 03/24/22  2147 03/25/22  0238 03/25/22  1019 03/26/22  0109   WBC 7.0  --  5.7  --  9.7   RBC 2.29*  --  2.77*  --  3.45*   HEMOGLOBIN 6.7*   < > 8.1* 9.7* 10.0*   HEMATOCRIT 21.5*  --  25.6*  --  30.8*   MCV 93.9  --  92.4  --  89.3   MCH 29.3  --  29.2  --  29.0   MCHC 31.2*  --  31.6*  --  32.5*   RDW 49.7  --  48.6  --  47.8   PLATELETCT 98*  --  95*  --  123*   MPV 13.8*  --  13.9*  --  13.6*    < > = values in this interval not displayed.     Recent Labs     03/23/22  1626 03/24/22  1025 03/25/22  0238   SODIUM 137 139 139   POTASSIUM 4.8 4.6 4.6   CHLORIDE 100 101 104   CO2 25 27 25   GLUCOSE 117* 101*  91   BUN 42* 42* 41*   CREATININE 1.91* 1.81* 1.84*   CALCIUM 9.0 9.2 8.7     Recent Labs     03/24/22  1025   INR 1.16*                   No current facility-administered medications on file prior to encounter.     Current Outpatient Medications on File Prior to Encounter   Medication Sig Dispense Refill   • benzonatate (TESSALON) 200 MG capsule Take 200 mg by mouth in the morning, at noon, and at bedtime.     • Multiple Vitamins-Minerals (OCUVITE-LUTEIN) Tab Take 1 Tablet by mouth every day.     • Apoaequorin (PREVAGEN PO) Take 1 Tablet by mouth every day.     • oxyCODONE-acetaminophen (PERCOCET) 5-325 MG Tab Take 0.5 Tablets by mouth 1 time a day as needed. Rarely takes  Indications: Pain     • furosemide (LASIX) 40 MG Tab Take 1 Tablet by mouth every day. 90 Tablet 3   • atorvastatin (LIPITOR) 40 MG Tab Take 1 Tablet by mouth every day. 30 Tablet 11   • clopidogrel (PLAVIX) 75 MG Tab Take 1 Tablet by mouth every day. 30 Tablet 11   • ipratropium-albuterol (DUONEB) 0.5-2.5 (3) MG/3ML nebulizer solution Take 3 mL by nebulization 2 times a day.     • Fluticasone-Umeclidin-Vilant (TRELEGY) 100-62.5-25 MCG/INH AEROSOL POWDER, BREATH ACTIVATED inhalation Inhale 1 Puff every day.     • guaiFENesin ER (MUCINEX) 600 MG TABLET SR 12 HR Take 600 mg by mouth 2 times a day.     • traZODone (DESYREL) 50 MG Tab Take 1 Tablet by mouth every evening. 30 Tablet 3   • acetaminophen (TYLENOL) 325 MG Tab Take 650 mg by mouth every 6 hours as needed. Indications: Pain     • aspirin 81 MG EC tablet Take 81 mg by mouth every day.     • Multiple Vitamin (MULTIVITAMIN ADULT PO) Take 1 Tablet by mouth every day.         Current Facility-Administered Medications   Medication Dose Frequency Provider Last Rate Last Admin   • omeprazole (PRILOSEC) capsule 40 mg  40 mg BID Evangelista Donovan M.D.       • potassium chloride SA (Kdur) tablet 20 mEq  20 mEq BID Diego Franco M.D.   20 mEq at 03/26/22 0524   • senna-docusate (PERICOLACE or SENOKOT S)  8.6-50 MG per tablet 2 Tablet  2 Tablet BID Diego Franco M.D.   2 Tablet at 03/25/22 1643    And   • polyethylene glycol/lytes (MIRALAX) PACKET 1 Packet  1 Packet QDAY PRN Diego Franco M.D.        And   • magnesium hydroxide (MILK OF MAGNESIA) suspension 30 mL  30 mL QDAY PRN Diego Franco M.D.        And   • bisacodyl (DULCOLAX) suppository 10 mg  10 mg QDAY PRN Diego Franco M.D.       • hydrALAZINE (APRESOLINE) injection 10 mg  10 mg Q4HRS PRN Diego Franco M.D.       • aspirin EC (ECOTRIN) tablet 81 mg  81 mg DAILY Diego Franco M.D.   81 mg at 03/26/22 0524   • atorvastatin (LIPITOR) tablet 40 mg  40 mg Q EVENING Diego Franco M.D.   40 mg at 03/25/22 1640   • clopidogrel (PLAVIX) tablet 75 mg  75 mg Q EVENING Diego Franco M.D.   75 mg at 03/25/22 1639   • budesonide-formoterol (SYMBICORT) 160-4.5 MCG/ACT inhaler 2 Puff  2 Puff BID (RT) Diego Franco M.D.   2 Puff at 03/25/22 2027   • tiotropium (Spiriva Respimat) 2.5 mcg/Act inhalation spray 5 mcg  5 mcg QDAILY (RT) Diego Franco M.D.   5 mcg at 03/25/22 0653   • ipratropium-albuterol (DUONEB) nebulizer solution  3 mL Q4H PRN (RT) Diego Franco M.D.       • oxyCODONE-acetaminophen (PERCOCET) 5-325 MG per tablet 1 Tablet  1 Tablet Q6HRS PRN Lane Ulloa M.D.   1 Tablet at 03/25/22 1646   • diclofenac sodium (Voltaren) 1 % gel 2 g  2 g 4X/DAY PRN Lane Ulloa M.D.       Last reviewed on 3/26/2022  7:32 AM by Perry Marquez M.D.    Medications reviewed    Imaging reviewed      Impressions:  1.  Acute GI bleeding requiring transfusion  2.  Bilateral groin hematomas  3.  Bilateral thigh pain  4.  Dyspnea on exertion related to above  5.  Severe aortic stenosis  6.  Very recent multivessel and complex left main PCI, Impella supported  7.  COVID-19 positive  8.  Anemia, stable but undertreated    Recommendations:  Hemoglobin appears stable.  Recommend iliofemoral arterial ultrasound to rule out pseudoaneurysm or iatrogenic arterial stenosis,  venous ultrasound was completed and is unremarkable.  Tolerating DAPT without recurrence.  No obvious source of GI bleeding to account for his anemia likely postprocedural.    If bilateral ultrasounds of the iliofemoral arterial system are unremarkable then I would agree with continuing DAPT and interval close follow-up in the cardiology office.  Continue other medical therapy.

## 2022-03-26 NOTE — PROGRESS NOTES
Straight cath performed after bladder scan showed 407 mL of urine in bladder. 400 mL output of urine was collected.

## 2022-03-26 NOTE — ANESTHESIA TIME REPORT
Anesthesia Start and Stop Event Times     Date Time Event    3/26/2022 0734 Ready for Procedure     0744 Anesthesia Start     0830 Anesthesia Stop        Responsible Staff  03/26/22    Name Role Begin End    Perry Marquez M.D. Anesth 0744 0830        Preop Diagnosis (Free Text):  Pre-op Diagnosis     evaluation of the GI tract prior to consideration of valvular repair        Preop Diagnosis (Codes):    Premium Reason  E. Weekend    Comments:

## 2022-03-26 NOTE — PROGRESS NOTES
Assumed care of patient. Bedside report received from Audrey STEVENSON. Updated POC, call light within reach, fall precautions in place. Bed locked, and in lowest position. Pt is A&Ox4, states 0/10 pain. Patient instructed to call for assistance. All questions answered, no further needs at this time.

## 2022-03-26 NOTE — ANESTHESIA POSTPROCEDURE EVALUATION
Patient: Delonte Alexander    Procedure Summary     Date: 03/26/22 Room / Location: [facility] OR 06 / SURGERY [facility]    Anesthesia Start: 0744 Anesthesia Stop: 0830    Procedures:       GASTROSCOPY (N/A Esophagus)      COLONOSCOPY (N/A Anus) Diagnosis: (Gastritis; Colon Polyps)    Surgeons: Dakota Gutierrez M.D. Responsible Provider: Perry Marquez M.D.    Anesthesia Type: general ASA Status: 3          Final Anesthesia Type: general  Last vitals  BP   Blood Pressure : 133/61    Temp   36.3 °C (97.4 °F)    Pulse   67   Resp   20    SpO2   94 %      Anesthesia Post Evaluation    Patient location during evaluation: PACU  Patient participation: complete - patient participated  Level of consciousness: awake and alert  Pain score: 0    Airway patency: patent  Anesthetic complications: no  Cardiovascular status: hemodynamically stable  Respiratory status: acceptable  Hydration status: euvolemic    PONV: none          No complications documented.     Nurse Pain Score: 0 (NPRS)

## 2022-03-26 NOTE — PROGRESS NOTES
A/P     Delonte Alexander is a 86 y.o. male with h/o of severe AS, TAA, 3V CAD s/p multiple PCI 3/21/2021, covid + 1/2022 now requiring 2-3L O2 rest (vaccinated pfizer + booster) p/w subacute nonorthopnic SCHULTE 2/2 symptomatic anemia requiring transfusion/severe AS           # nonorthopnic SCHULTE 2/2 symptomatic anemia requiring transfusion  - since 3/2022 patient drop hgb 10 to 6 s/p 3 unir PRBC last 3/25, p/w SCHULTE, non orthopnic, on baseline oxygen a/w hip and thigh pain noted b/l bruising around prior catheter sites without bruit c/f pseudoaneurysm vs post procedural blood loss  - denies melena, hematochezia, abd pain  - CT abd no retroperitoneal hematoma  - s/p EGD/colonscopy 3/26 w/o source  - pending arterial u/s asses for pseudoaneurysm    improvement with transfusions         # severe AS,  # 3V CAD s/p multiple PCI 3/21/2021  - cnt asa/plavix, satin,   - hold ace given ANDREW vs CKD? crt 1.7 no baseline since 2008  - hold metop given severe AS/low HRs  - probnp and trops 7K and 300 respectivly w/o baseline, denies cp  - tte EF 55%, nrml RV fx  - dyspnea no improvement with empiric diuresis

## 2022-03-27 NOTE — PROGRESS NOTES
Bedside report received from night RN, pt care assumed, assessment compmleted. Pt is A&O 4, pain 0/10. Updated on POC, questions answered. Bed in lowest, locked position, treaded socks on, call light and belongings in reach.

## 2022-03-27 NOTE — PROGRESS NOTES
Daily Progress Note:     Date of Service: 3/27/2022  Primary Team: [facility] IM White Team   Attending: Evangelista Donovan M.D.   Senior Resident: Dr. Yusuf  Intern: Yareli Mccormick M.D.   Contact:  [phone]    ID:  85 yo m with severe AS, 3v CAD s/p PCI with 4 stents placed 3/21/2022 c/b bilateral groin hematomas managed perioperatively percutaneously, h/o COVID in January 2022 now on 2-3 L O2 at baseline presented with subacute nonorthopnic SCHULTE symptomatic anemia likely 2/2 acute blood loss from recent cath procedure stable after 3 units of blood.     IE:  NAEO    Subjective  Patient reports feeling well. Says has had improvement in bilateral leg pain. Walked with patient on the unit today and he did not have pain or symptoms.   Had a 2-3L O2 requirement with ambulation.   Cardiology has signed off.  Patient's family concerned about deconditioning and feel that he should go to acute rehab prior to returning home.     Eating and drinking well.     Denies chest pain, abdomial pain, n/v.     Consultants/Specialty:  GI  Cardiology    Review of Systems:    Review of Systems   Constitutional: Negative for chills, fever and weight loss.   HENT: Negative for congestion and sore throat.    Eyes: Negative for blurred vision.   Respiratory: Positive for shortness of breath. Negative for cough.    Cardiovascular: Negative for chest pain, palpitations and leg swelling.   Gastrointestinal: Negative for abdominal pain, nausea and vomiting.   Neurological: Negative for dizziness and weakness.   Psychiatric/Behavioral: Negative for depression.       Objective Data:   Physical Exam:   Vitals:   Temp:  [36.2 °C (97.2 °F)-36.8 °C (98.2 °F)] 36.2 °C (97.2 °F)  Pulse:  [63-77] 66  Resp:  [14-16] 16  BP: (133)/(59) 133/59  SpO2:  [91 %-95 %] 95 %    Physical Exam  Constitutional:       General: He is not in acute distress.  HENT:      Head: Normocephalic and atraumatic.      Mouth/Throat:      Mouth: Mucous membranes are moist.   Eyes:       General: No scleral icterus.     Extraocular Movements: Extraocular movements intact.      Conjunctiva/sclera: Conjunctivae normal.   Cardiovascular:      Rate and Rhythm: Normal rate and regular rhythm.      Pulses: Normal pulses.      Heart sounds: Murmur heard.   Pulmonary:      Effort: Pulmonary effort is normal. No respiratory distress.      Breath sounds: Rhonchi present.   Abdominal:      General: Abdomen is flat. Bowel sounds are normal. There is no distension.      Palpations: Abdomen is soft.      Tenderness: There is no abdominal tenderness.   Musculoskeletal:         General: No swelling. Normal range of motion.      Cervical back: Normal range of motion.      Comments: Trace bilateral edema   Skin:     General: Skin is warm and dry.   Neurological:      General: No focal deficit present.      Mental Status: He is alert and oriented to person, place, and time.   Psychiatric:         Mood and Affect: Mood normal.         Behavior: Behavior normal.         Thought Content: Thought content normal.         Judgment: Judgment normal.           Labs:   Recent Labs     03/25/22  0238 03/25/22  1019 03/26/22  0109   WBC 5.7  --  9.7   RBC 2.77*  --  3.45*   HEMOGLOBIN 8.1* 9.7* 10.0*   HEMATOCRIT 25.6*  --  30.8*   MCV 92.4  --  89.3   MCH 29.2  --  29.0   RDW 48.6  --  47.8   PLATELETCT 95*  --  123*   MPV 13.9*  --  13.6*   NEUTSPOLYS 71.00  --   --    LYMPHOCYTES 9.80*  --   --    MONOCYTES 10.20  --   --    EOSINOPHILS 7.40*  --   --    BASOPHILS 0.50  --   --      Recent Labs     03/25/22 0238   SODIUM 139   POTASSIUM 4.6   CHLORIDE 104   CO2 25   GLUCOSE 91   BUN 41*     Recent Labs     03/25/22  0238   CREATININE 1.84*         Imaging:   US-EXTREMITY ARTERY LOWER UNILAT RIGHT   Final Result      US-EXTREMITY VENOUS LOWER BILAT   Final Result      EC-ECHOCARDIOGRAM COMPLETE W/O CONT   Final Result      CT-ABDOMEN-PELVIS W/O   Final Result      1.  Noncontrast CT evidence of abdominopelvic bleeding.  Specifically, no retroperitoneal hematoma.   2.  Chronic wall thickening of the anterior infrarenal aorta, possibly an old intramural hematoma. It was present on the CT obtained prior to percutaneous coronary intervention.   3.  Small left effusion and associated atelectasis.   4.  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   5.  Bilateral nonobstructing nephrolithiasis.   6.  Findings of anemia.            DX-CHEST-LIMITED (1 VIEW)   Final Result      1.  Bilateral airspace opacities most prominent in the mid to lower lung zones may represent pulmonary edema or multifocal pneumonia.   2.  Small bilateral pleural effusions with overlying atelectasis/consolidation.   3.  Cardiomegaly. Correlation with echocardiogram is recommended as a pericardial effusion is not excluded.   4.  Calcified pleural plaques can be seen as a sequela of prior asbestos exposure.             Problem Representation:   85 yo m with severe AS, 3v CAD s/p PCI with 4 stents placed 3/21/2022 c/b bilateral groin hematomas managed perioperatively percutaneously, h/o COVID in January 2022 now on 2-3 L O2 at baseline presented with symptomatic acute on chronic anemia likely 2/2 to recent cath procedure, s/p EGD/colonscopy with no evidence of acute bleed, pending PT/OT assessment for discharge home with  vs SNF.    * Anemia- (present on admission)  Assessment & Plan  Significant anemia with 6.7, 14 baseline.  Likely 2/2 to recent cath procedure 3/21 that was c/b bilateral groin hematomas.  GI bleed r/o- s/p EGD/colonscopy 3/26 with no sign of active bleed  S/p 3 units PRBC  Plt 95  TEG study benign  Ferritin 457  Fe 25  B12/TSH wnl  Hgb stable   HD stable  Iliofemoral arterial US without sign of pseudoaneurysm.     -continue ASA/Plavix     CAD, multiple vessel  Assessment & Plan  S/p PCI with 4 stents on 3/21/2022 c/b bilateral groin hematomas  Cardiology following    -f/u arterial US as above  -continue ASA/plavix    Lower  extremity pain, bilateral  Assessment & Plan  Resolved.  Walked 3/27/2022 with no pain.   Bilateral venous doppler negative for DVT  Concern for arterial pathology given recent PCI procedure  No signs of cellulitis.  Weak distal pulses though feet appear well perfused   Arterial US without sign of pseudoaneurysm.     Nonrheumatic aortic valve stenosis- (present on admission)  Assessment & Plan  Was planned for TAVR, had recent angio and received 4 stents on 3/21/2022.  BP control.  Cardiology following    -appreciate card recs    Elevated troponin  Assessment & Plan  Elevated 321, stable. Likely elevated after PCI.  No chest pain  No signfiicant ischemia on ekg  Cardiology following    Acute kidney failure (HCC)  Assessment & Plan  Likely cardiorenal and anemia.  Transfusion for severe anemia.  Bladder scan  Urinalysis.  Continue to monitor    Volume overload- (present on admission)  Assessment & Plan  Resolved  S/p IV lasix with net neg 3L.  Likely due to Severe aortic stenosis  Has been compliant with lasix  CXR consistent with bilateral pleural effusion and pulmonary edema  BNP 7k  No chest pain  No need for TTE given recent cathetherization.    Thrombocytopenia ([facility])- (present on admission)  Assessment & Plan  Unclear etiology.  TEG normal    -transfuse for plt <50    COVID- (present on admission)  Assessment & Plan  January 2022, was admitted for covid and received dexamethasone  Test positive, however no significant signs of active infection  No dexa due to concerning GI bleed.  No isolation indicated at this time.    Acute respiratory failure with hypoxemia (HCC)- (present on admission)  Assessment & Plan  Resolved.  Patient is on baseline O2 requirement of 2-3L since COVID infection in January 2022  Dyspnea likely related so severe AS

## 2022-03-27 NOTE — PROGRESS NOTES
Assumed care of patient. Bedside report received from Rhea. Updated POC, call light within reach, fall precautions in place. Bed locked, and in lowest position. Pt is A&Ox4, brushing teeth at bedside. Patient instructed to call for assistance. All questions answered, no further needs at this time.

## 2022-03-27 NOTE — CARE PLAN
The patient is Stable - Low risk of patient condition declining or worsening    Shift Goals  Clinical Goals: Pain control  Patient Goals: Rest      Problem: Care Map:  Day of Discharge Optimal Outcome for the Heart Failure Patient  Goal: Day of Discharge:  Optimal Care of the heart failure patient  Outcome: Progressing     Problem: Knowledge Deficit - Standard  Goal: Patient and family/care givers will demonstrate understanding of plan of care, disease process/condition, diagnostic tests and medications  Outcome: Progressing       Progress made toward(s) clinical / shift goals:  Progressing

## 2022-03-27 NOTE — PROGRESS NOTES
A/P       Delonte Alexander is a 86 y.o. male with h/o of   severe AS, TAA, 3V CAD s/p   multiple PCI 3/21/2021, covid + 1/2022 now requiring 2-3L O2 rest (vaccinated pfizer + booster) p/w subacute nonorthopnic SCHULTE 2/2 symptomatic anemia requiring transfusion/severe AS               # nonorthopnic SCHULTE 2/2 symptomatic anemia requiring transfusion   - since 3/2022 patient drop hgb 10 to 6 s/p 3 unir PRBC last 3/25, p/w SCHULTE, non orthopnic, on baseline oxygen a/w hip and thigh pain noted b/l bruising around prior catheter sites concerning for post procedural blood loss   - denies melena, hematochezia, abd pain   - CT abd no retroperitoneal hematoma   - s/p EGD/colonscopy 3/26 w/o source   - arterial u/s no hematomas or  pseudoaneurysm              # severe AS,   # 3V CAD s/p multiple PCI 3/21/2021   - cnt asa/plavix, satin,    - hold ace given ANDREW vs CKD? crt 1.7 no baseline since 2008   - discuss cards resumption of metop given severe AS/  - probnp and trops 7K and 300 respectivly w/o baseline, denies cp   - tte EF 55%, nrml RV fx   - dyspnea no improvement with empiric diuresis

## 2022-03-27 NOTE — PROGRESS NOTES
Monitor summary:     0.24/0.14/0.44     Sinus Rhythm 67 - 90     with 1st degree HB, BBB, rare PVCs

## 2022-03-27 NOTE — CARE PLAN
Problem: Care Map:  Day 3 Optimal Outcome for the Heart Failure Patient  Goal: Day 3:  Optimal Care of the heart failure patient  Outcome: Progressing     Problem: Knowledge Deficit - Standard  Goal: Patient and family/care givers will demonstrate understanding of plan of care, disease process/condition, diagnostic tests and medications  Outcome: Progressing     Problem: Pain - Standard  Goal: Alleviation of pain or a reduction in pain to the patient’s comfort goal  Outcome: Progressing     Problem: Fall Risk  Goal: Patient will remain free from falls  Outcome: Progressing     Problem: Hemodynamics  Goal: Patient's hemodynamics, fluid balance and neurologic status will be stable or improve  Outcome: Progressing   The patient is Stable - Low risk of patient condition declining or worsening    Shift Goals  Clinical Goals: Ambulation, DC  Patient Goals: Rest    Progress made toward(s) clinical / shift goals:  progressing    Patient is not progressing towards the following goals:

## 2022-03-28 NOTE — DISCHARGE PLANNING
Anticipated Discharge Disposition: DC home with [facility] today.     Action: Order rec’d for HH for PT and OT only. Teams texted Dr Mccormick to also order skilled nursing to hh order. Choice from to be completed with pt at bedside, then faxed to [facility] to send HH referral.     Rev’d with Dr Mccormick and she spoke to pt’s dtr who is requesting pt receive therapy more than once a week. Call placed to ericka Adames for [facility] and she will tell staff to follow up on that to see if pt can receive therapy more frequently.    Barriers to Discharge: await [facility] resumption of service.     Plan: plan dc home to day with [facility].

## 2022-03-28 NOTE — DISCHARGE SUMMARY
Discharge Summary    CHIEF COMPLAINT ON ADMISSION  Chief Complaint   Patient presents with   • Sent by MD Dr Acosta increased sob, leg pain   • Post-op Problem     3/21 cardiac stents placed       Reason for Admission  shortness of breath and fatigue during ambulation    Admission Date  3/24/2022    CODE STATUS  DNAR/DNI    HPI & HOSPITAL COURSE  This is a 86 y.o. male here with medical history notable for severe aortic stenosis and planned TAVR, recent 4 stents 3/21/2022, covid in January 2022 and subsequent requirement for 3L O2. He underwent PCI on 3/21/2022. He was placed on impella device temporarily during procedure, and PCI proximal left circumflex artery second OM and left main into proximal LAD. Procedure complicated with right femoral arterial bleeding which was treated with manual pressure and protamine. He was discharged in stable condition.    On 3/24/2022 the patient with shortness of breath and fatigue during ambulation. After the cath procedure, he had been progressively weak and had been winded on ambulation almost instantly, associated with PND and orthopnea. Denied any chest pain, palpitations. He had been taking his lasix and DAPT regularly. He denied any melena or hematochezia, chest pain, abdominal pain, flank pain or back pain, syncope or presyncope, lightheadedness, fever or chills. In the emergency department, patient was hemodynamically stable, on 3L baseline O2, normal temp and HR, trop was elevated 322, WBC normal, significant anemia with 6.7 therefore transfusion was initiated. Bedside rectal exam revealed brown stool, however positive for blood. Chest xray showed bilateral pleural effusion and pulmonary edema. He was admitted to the hospital for anemia requiring transfusions and volume overload.    Patient had symptomatic acute on chronic anemia likely 2/2 to recent cath procedure, s/p EGD/colonscopy with no evidence of acute bleed, femoral arterial doppler negative for hematoma.  PT/OT assessment recommended discharge home with HH. Cardiology recommends continuing DAPT and interval close follow-up in the cardiology office.    Therefore, he is discharged in fair and stable condition to home with organized home healthcare and close outpatient follow-up. PATIENT IS DISCHARGED TO HOME, NOT HOSPICE.  There were erroneous notes by case management discharge planning mentioning hospice on 3/28/22 which were later revised. Patient is not on hospice and has never been on hospice status. This was clarified to case management by Dr. Mccormick on 3/22/28.      The patient met 2-midnight criteria for an inpatient stay at the time of discharge.     Review of Systems   Constitutional: Negative for chills, fever and weight loss.   HENT: Negative for congestion and sore throat.    Eyes: Negative for blurred vision.   Respiratory: Negative for dyspnea. Negative for cough.    Cardiovascular: Negative for chest pain, palpitations and leg swelling.   Gastrointestinal: Negative for abdominal pain, nausea and vomiting.   Neurological: Negative for dizziness and weakness.   Psychiatric/Behavioral: Negative for depression.     Physical Exam  Constitutional:       General: He is not in acute distress.  HENT:      Head: Normocephalic and atraumatic.      Mouth/Throat:      Mouth: Mucous membranes are moist.   Eyes:      General: No scleral icterus.     Extraocular Movements: Extraocular movements intact.      Conjunctiva/sclera: Conjunctivae normal.   Cardiovascular:      Rate and Rhythm: Normal rate and regular rhythm.      Pulses: Normal pulses.      Heart sounds: Murmur heard.   Pulmonary:      Effort: Pulmonary effort is normal. No respiratory distress.      Breath sounds: Mild wheezing present.   Abdominal:      General: Abdomen is flat. Bowel sounds are normal. There is no distension.      Palpations: Abdomen is soft.      Tenderness: There is no abdominal tenderness.   Musculoskeletal:         General: No swelling.  Normal range of motion.      Cervical back: Normal range of motion.      Comments: Trace bilateral edema   Skin:     General: Skin is warm and dry.   Neurological:      General: No focal deficit present.      Mental Status: He is alert and oriented to person, place, and time.   Psychiatric:         Mood and Affect: Mood normal.         Behavior: Behavior normal.         Thought Content: Thought content normal.         Judgment: Judgment normal.     Discharge Date  3/28/22    FOLLOW UP ITEMS POST DISCHARGE  PCP to follow up on pathology from colonoscopy/EGD biopsies done on 3/26/22  Follow up with cardiology APRN Alana Yarbrough on 4/12/22 within 2 weeks  Follow up with PCP Nicole Tse      DISCHARGE DIAGNOSES  Principal Problem:    Anemia POA: Yes  Active Problems:    Acute respiratory failure with hypoxemia (HCC) POA: Yes    COVID POA: Yes    Nonrheumatic aortic valve stenosis POA: Yes    Thrombocytopenia (HCC) POA: Yes    Volume overload POA: Yes    GI bleed POA: Unknown    Acute kidney failure (HCC) POA: Unknown    Elevated troponin POA: Unknown    Lower extremity pain, bilateral POA: Unknown    CAD, multiple vessel POA: Unknown  Resolved Problems:    * No resolved hospital problems. *      FOLLOW UP  Future Appointments   Date Time Provider Department Center   3/31/2022 10:00 AM [facility] EXAM 3 [facility]   4/12/2022  2:00 PM HIMANSHU He [facility] None   8/2/2022 10:00 AM Jamee Christine M.D. [facility]     Nicole Tse P.A.-C.  [address] [id number] [phone]    On 3/31/2022  Your will be visiting you in the afternoon, please call if you need to reschedule. Thank you    Nicole Tse P.A.-C.  [address] [phone]    Schedule an appointment as soon as possible for a visit      HIMANSHU He  [address] [id number] [phone]    On 4/12/2022        MEDICATIONS ON DISCHARGE     Medication List      START taking these  medications      Instructions   omeprazole 40 MG delayed-release capsule  Commonly known as: PRILOSEC   Take 1 Capsule by mouth every day for 30 days.  Dose: 40 mg        CHANGE how you take these medications      Instructions   furosemide 20 MG Tabs  What changed:   · medication strength  · how much to take  · when to take this  · reasons to take this  Commonly known as: LASIX   Doctor's comments: Take as needed for swelling in the legs or if you notice weight increasing.  Take 1 Tablet by mouth 1 time a day as needed.  Dose: 20 mg        CONTINUE taking these medications      Instructions   acetaminophen 325 MG Tabs  Commonly known as: Tylenol   Take 650 mg by mouth every 6 hours as needed. Indications: Pain  Dose: 650 mg     aspirin 81 MG EC tablet   Take 81 mg by mouth every day.  Dose: 81 mg     atorvastatin 40 MG Tabs  Commonly known as: LIPITOR   Take 1 Tablet by mouth every day.  Dose: 40 mg     clopidogrel 75 MG Tabs  Commonly known as: PLAVIX   Doctor's comments: meds to bed please  Take 1 Tablet by mouth every day.  Dose: 75 mg     Fluticasone-Umeclidin-Vilant 100-62.5-25 MCG/INH Aepb inhalation  Commonly known as: TRELEGY   Inhale 1 Puff every day.  Dose: 1 Puff     ipratropium-albuterol 0.5-2.5 (3) MG/3ML nebulizer solution  Commonly known as: DUONEB   Take 3 mL by nebulization 2 times a day.  Dose: 3 mL     traZODone 50 MG Tabs  Commonly known as: DESYREL   Take 1 Tablet by mouth every evening.  Dose: 50 mg        STOP taking these medications    benzonatate 200 MG capsule  Commonly known as: TESSALON     guaiFENesin  MG Tb12  Commonly known as: MUCINEX     MULTIVITAMIN ADULT PO     Ocuvite-Lutein Tabs     oxyCODONE-acetaminophen 5-325 MG Tabs  Commonly known as: PERCOCET     PREVAGEN PO            Allergies  No Known Allergies    DIET  Orders Placed This Encounter   Procedures   • Diet Order Diet: Cardiac; Second Modifier: (optional): 2 Gram Sodium     Standing Status:   Standing     Number of  Occurrences:   1     Order Specific Question:   Diet:     Answer:   Cardiac [6]     Order Specific Question:   Second Modifier: (optional)     Answer:   2 Gram Sodium [7]       ACTIVITY  As tolerated.  Weight bearing as tolerated    CONSULTATIONS  Cardiology  GI    PROCEDURES  Colonoscopy with polypectomy and EGD with biopsies 3/26/22    LABORATORY  Lab Results   Component Value Date    SODIUM 139 03/25/2022    POTASSIUM 4.6 03/25/2022    CHLORIDE 104 03/25/2022    CO2 25 03/25/2022    GLUCOSE 91 03/25/2022    BUN 41 (H) 03/25/2022    CREATININE 1.84 (H) 03/25/2022    CREATININE 1.1 02/29/2008        Lab Results   Component Value Date    WBC 9.7 03/26/2022    HEMOGLOBIN 10.0 (L) 03/26/2022    HEMATOCRIT 30.8 (L) 03/26/2022    PLATELETCT 123 (L) 03/26/2022        Total time of the discharge process exceeds 45 minutes.

## 2022-03-28 NOTE — THERAPY
Physical Therapy   Daily Treatment     Patient Name: Delonte Alexander  Age:  86 y.o., Sex:  male  Medical Record #: [id number]  Today's Date: 3/27/2022     Precautions: Fall Risk    Assessment  Pt seen for PT follow-up session. Pt completed all mobility without assist, ambulated 150ft with FWW and SPV, no LOB. Pt negotiated 3 steps with B rails, no difficulty. Pt with mild fatigue following ambulation, SpO2 94%. Pt appears functionally capable of return home at this time. Also reports family is able to assist if needed; however currently mobilizing without assist. Recommend HHPT to facilitate return home. Patient will not be actively followed for physical therapy services at this time, however may be seen if requested by physician for 1 more visit within 30 days to address any discharge or equipment needs.    Plan  Discharge secondary to goals met.  DC Equipment Recommendations: None  Discharge Recommendations: Recommend home health for continued physical therapy services       03/27/22 1651   Balance   Sitting Balance (Static) Fair +   Sitting Balance (Dynamic) Fair +   Standing Balance (Static) Fair   Standing Balance (Dynamic) Fair   Weight Shift Sitting Fair   Weight Shift Standing Fair   Comments standing with FWW   Gait Analysis   Gait Level Of Assist Supervised   Assistive Device Front Wheel Walker   Distance (Feet) 150   # of Stairs Climbed 3   Level of Assist with Stairs Supervised   Weight Bearing Status no restrictions   Skilled Intervention Verbal Cuing;Postural Facilitation   Comments no LOB, use of B rails, mild fatigue   Bed Mobility    Supine to Sit Supervised   Sit to Supine Supervised   Scooting Supervised   Comments pt sleeps in hospital bed at home   Functional Mobility   Sit to Stand Supervised   Short Term Goals    Short Term Goal # 1 Pt will ambulate 150ft with FWW and SPV in 6 visits to return to PLOF   Goal Outcome # 1 Goal met   Short Term Goal # 2 Pt will negotiate 2 steps with BUE support  and SPV in 6 visits to safely enter/exit home   Goal Outcome # 2 Goal met

## 2022-03-28 NOTE — DISCHARGE PLANNING
Anticipated Discharge Disposition: Hospice    Action: Order rec’d for HH for PT and OT only. Teams texted Dr Mccormick to also order skilled nursing to hh order. Choice from to be completed with pt at bedside, then faxed to El[facility] to send HH referral.     Rev’d with Dr Mccormick and she spoke to pt’s dtr who is requesting pt receive therapy more than once a week. Call placed to ericka Adames for [facility] and she will tell staff to follow up on that to see if pt can receive therapy more frequently.    Barriers to Discharge: await [facility] resumption of service.     Plan: plan dc home to day with [facility].

## 2022-03-28 NOTE — DISCHARGE INSTRUCTIONS
Discharge Instructions    Discharged to home by car with relative. Discharged via wheelchair, hospital escort: Yes.  Special equipment needed: Not Applicable    Be sure to schedule a follow-up appointment with your primary care doctor or any specialists as instructed.     Discharge Plan:   Diet Plan: Discussed  Activity Level: Discussed  Confirmed Follow up Appointment: Appointment Scheduled  Confirmed Symptoms Management: Discussed  Medication Reconciliation Updated: Yes  Influenza Vaccine Indication: Not indicated: Previously immunized this influenza season and > 8 years of age    I understand that a diet low in cholesterol, fat, and sodium is recommended for good health. Unless I have been given specific instructions below for another diet, I accept this instruction as my diet prescription.   Other diet: cardiac    Special Instructions: None    · Is patient discharged on Warfarin / Coumadin?   No     Depression / Suicide Risk    As you are discharged from this [facility] facility, it is important to learn how to keep safe from harming yourself.    Recognize the warning signs:  · Abrupt changes in personality, positive or negative- including increase in energy   · Giving away possessions  · Change in eating patterns- significant weight changes-  positive or negative  · Change in sleeping patterns- unable to sleep or sleeping all the time   · Unwillingness or inability to communicate  · Depression  · Unusual sadness, discouragement and loneliness  · Talk of wanting to die  · Neglect of personal appearance   · Rebelliousness- reckless behavior  · Withdrawal from people/activities they love  · Confusion- inability to concentrate     If you or a loved one observes any of these behaviors or has concerns about self-harm, here's what you can do:  · Talk about it- your feelings and reasons for harming yourself  · Remove any means that you might use to hurt yourself (examples: pills, rope, extension cords,  firearm)  · Get professional help from the community (Mental Health, Substance Abuse, psychological counseling)  · Do not be alone:Call your Safe Contact- someone whom you trust who will be there for you.  · Call your local CRISIS HOTLINE 309-6655 or 609-955-4715  · Call your local Children's Mobile Crisis Response Team [place] (903) 257-4135 or www.Ferfics  · Call the toll free National Suicide Prevention Hotlines   · National Suicide Prevention Lifeline 561-073-HEGN (0713)  · [employer] Hope Line Network 800-SUICIDE (010-4709)        Omeprazole capsule (Dr. Olivia's OTC)  What is this medicine?  OMEPRAZOLE (oh ME pray zol) prevents the production of acid in the stomach. It is used to treat the symptoms of heartburn. You can buy this medicine without a prescription. This product is not for long-term use, unless otherwise directed by your doctor or health care professional.  This medicine may be used for other purposes; ask your health care provider or pharmacist if you have questions.  What should I tell my health care provider before I take this medicine?  They need to know if you have any of these conditions:  -black or bloody stools  -chest pain  -difficulty swallowing  -have had heartburn for over 3 months  -have heartburn with dizziness, lightheadedness or sweating  -liver disease  -stomach pain  -unexplained weight loss  -vomiting with blood  -wheezing  -an unusual or allergic reaction to omeprazole, other medicines, foods, dyes, or preservatives  -pregnant or trying to get pregnant  -breast-feeding  How should I use this medicine?  Take this medicine by mouth. Follow the directions on the product label. If you are taking this medicine without a prescription, take one tablet every day. Do not use for longer than 14 days or repeat a course of treatment more often than every 4 months unless directed by a doctor or healthcare professional. Take your dose at regular intervals every 24 hours. Swallow  the tablet whole with a drink of water. Do not crush, break or chew. This medicine works best if taken on an empty stomach 30 minutes before breakfast. If you are using this medicine with the prescription of your doctor or healthcare professional, follow the directions you were given. Do not take your medicine more often than directed.  Talk to your pediatrician regarding the use of this medicine in children. Special care may be needed.  Overdosage: If you think you've taken too much of this medicine contact a poison control center or emergency room at once.  Overdosage: If you think you have taken too much of this medicine contact a poison control center or emergency room at once.  NOTE: This medicine is only for you. Do not share this medicine with others.  What if I miss a dose?  If you miss a dose, take it as soon as you can. If it is almost time for your next dose, take only that dose. Do not take double or extra doses.  What may interact with this medicine?  Do not take this medicine with any of the following medications:  -atazanavir  -certain medicines that treat or prevent blood clots like clopidogrel, warfarin  -nelfinavir   This medicine may also interact with the following medications:  -ampicillin  -certain medicines for anxiety or sleep  -cyclosporine  -diazepam  -digoxin  -disulfiram  -iron salts  -phenytoin  -prescription medicine for fungal or yeast infection like itraconazole, ketoconazole, voriconazole  -saquinavir  -tacrolimus  This list may not describe all possible interactions. Give your health care provider a list of all the medicines, herbs, non-prescription drugs, or dietary supplements you use. Also tell them if you smoke, drink alcohol, or use illegal drugs. Some items may interact with your medicine.  What should I watch for while using this medicine?  It can take several days before your heartburn gets better. Check with your doctor or health care professional if your condition does not  start to get better, or if it gets worse.  Do not treat yourself for heartburn with this medicine for more than 14 days in a row. You should only use this medicine for a 2-week treatment period once every 4 months. If your symptoms return shortly after your therapy is complete, or within the 4 month time frame, call your doctor or health care professional.  What side effects may I notice from receiving this medicine?  Side effects that you should report to your doctor or health care professional as soon as possible:  -blood in urine  -bone, muscle or joint pain  -chest pain or tightness  -dark yellow or brown urine  -fever or sore throat  -redness, blistering, peeling or loosening of the skin, including inside the mouth  -shortness of breath  -skin rash  -yellowing of the eyes or skin   Side effects that usually do not require medical attention (Report these to your doctor or health care professional if they continue or are bothersome.):  -diarrhea or constipation  -headache  This list may not describe all possible side effects. Call your doctor for medical advice about side effects. You may report side effects to FDA at 4-493-FDA-8015.  Where should I keep my medicine?  Keep out of the reach of children.  Store at room temperature between 20 and 25 degrees C (68 and 77 degrees F). Protect from light and moisture. Throw away any unused medicine after the expiration date.  NOTE: This sheet is a summary. It may not cover all possible information. If you have questions about this medicine, talk to your doctor, pharmacist, or health care provider.  © 2014, [employer]. (10/19/2011 10:19:01 PM)

## 2022-03-28 NOTE — CARE PLAN
The patient is Watcher - Medium risk of patient condition declining or worsening    Shift Goals  Clinical Goals: Monitor Skin  Patient Goals: Comfort and Rest  Family Goals: NIKKI    Progress made toward(s) clinical / shift goals:        Problem: Knowledge Deficit - Standard  Goal: Patient and family/care givers will demonstrate understanding of plan of care, disease process/condition, diagnostic tests and medications  Outcome: Progressing     Problem: Pain - Standard  Goal: Alleviation of pain or a reduction in pain to the patient’s comfort goal  Outcome: Progressing

## 2022-03-28 NOTE — FACE TO FACE
Face to Face Supporting Documentation - Home Health    The encounter with this patient was in whole or in part the primary reason for home health admission.    Date of encounter:   Patient:                    MRN:                       YOB: 2021  Delonte Alexander  [id number]  1935     Home health to see patient for:  Physical Therapy evaluation and treatment and Occupational therapy evaluation and treatment    Skilled need for:  Comment: s/p PCI with 4 stents    Skilled nursing interventions to include:  Comment: assess needs    Homebound status evidenced by:  Need the aid of supportive devices such as crutches, canes, wheelchairs or walkers or Needs the assistance of another person in order to leave the home. Leaving home requires a considerable and taxing effort. There is a normal inability to leave the home.    Community Physician to provide follow up care: Nicole Tse P.A.-C.     Optional Interventions? No      I certify the face to face encounter for this home health care referral meets the CMS requirements and the encounter/clinical assessment with the patient was, in whole, or in part, for the medical condition(s) listed above, which is the primary reason for home health care. Based on my clinical findings: the service(s) are medically necessary, support the need for home health care, and the homebound criteria are met.  I certify that this patient has had a face to face encounter by myself.  Yareli Mccormick M.D. - WILLIAMI: [phone]

## 2022-03-28 NOTE — TELEPHONE ENCOUNTER
----- Message from Casey Acosta M.D. sent at 3/28/2022  3:10 PM PDT -----  Regarding: RE: JOHNSON?  Can skip the JOHNSON    Thx  BE  ----- Message -----  From: Jane Gutierrez R.N.  Sent: 3/28/2022   1:45 PM PDT  To: Casey Acosta M.D.  Subject: JOHNSON?                                             Do you still want him to have his JOHNSON 3/31 as previously planned? Thanks.

## 2022-03-28 NOTE — PROGRESS NOTES
Stevens TAVR review: Consider a 26 S3 Ultra from a possible right carotid approach.  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  R18, Ca21

## 2022-03-28 NOTE — DISCHARGE PLANNING
Received Choice form at 1235  Agency/Facility Name: [facility]/ [facility]  Referral sent per Choice form @ 1247     1440-  Received Choice form at 1310  Agency/Facility Name: [facility]  Referral sent per Choice form @ 1442 4002-  Agency[facility]Facility Name: [facility]    Outcome: [facility] is accepting Pt because it is a resumption of care.

## 2022-03-28 NOTE — PROGRESS NOTES
Monitor Summary     Rhythm: SR    Rate:  65-86    Ectopy: rPVC, fPAC     Monitor Strip     TN .23  QRS .16  QT .47

## 2022-03-28 NOTE — PROGRESS NOTES
Assuming patient care of T- 804. Report received bedside by Abdulaziz STEVENSON. Patient visually assessed and updated on POC.     Bed is locked and in lowest position. Bed alarm checked for proper functioning and is currently on.  All obstacles clear from floor and personal belongings at bedside.    RN call button is w/in reach and education provided on proper use.     Medications, labs and orders reviewed.

## 2022-03-28 NOTE — FLOWSHEET NOTE
Respiratory Therapy Update                       Cough: Non Productive;Strong (03/28/22 0958)  Sputum Amount: Unable to Evaluate (03/28/22 0958)  Sputum Color: Unable to Evaluate (03/28/22 0958)  Sputum Consistency: Unable to Evaluate (03/28/22 0958)                  O2 (LPM): 3 (03/28/22 0958)       Breath Sounds  RUL Breath Sounds: Clear (03/28/22 0958)  RML Breath Sounds: Clear;Diminished (03/28/22 0958)  RLL Breath Sounds: Diminished (03/28/22 0958)  MARGARET Breath Sounds: Clear (03/28/22 0958)  LLL Breath Sounds: Diminished (03/28/22 0958)      Events/Summary/Plan: MDI's given with pt sitting in a chair.  No distress. (03/28/22 0958)

## 2022-03-28 NOTE — TELEPHONE ENCOUNTER
----- Message from Jane Gutierrez R.N. sent at 3/28/2022  3:14 PM PDT -----  Regarding: FW: JOHNSON?  Can you please cancel JOHNSON per Dr. Acosta. Thank you!  ----- Message -----  From: Casey Acosta M.D.  Sent: 3/28/2022   3:10 PM PDT  To: Jane Gutierrez R.N.  Subject: RE: JOHNSON?                                         Can skip the JOHNSON    Thx  BE  ----- Message -----  From: Jane Gutierrez R.N.  Sent: 3/28/2022   1:45 PM PDT  To: Casey Acosta M.D.  Subject: JOHNSON?                                             Do you still want him to have his JOHNSON 3/31 as previously planned? Thanks.

## 2022-03-30 NOTE — TELEPHONE ENCOUNTER
Valve Conference Plan of Care: 3/30/2022           TAVR Candidate: No. Patient postponed to recover from COVID, PCI, and have further pulmonology workup. To FU with Dr. Acosta next week to discuss plan further.

## 2022-03-31 NOTE — TELEPHONE ENCOUNTER
Called and notified patient's daughter Yvonne. Scheduled FU with Dr. Acosta 4/6.    Task to Chyna DANGELO to request pulmonology records from Dr. Schmitz from yesterday.

## 2022-03-31 NOTE — TELEPHONE ENCOUNTER
----- Message from Jane Gutierrez R.N. sent at 3/31/2022 11:12 AM PDT -----  Regarding: Please request records  Can you please request records from Dr. Schmitz (pulmonology)? Patient saw her yesterday. Thanks!

## 2022-04-04 NOTE — TELEPHONE ENCOUNTER
Received VM from patient's daughter Yvonne stating patient has gained 6lbs (from 181-187) and the 20mg PRN furosemide isn't helping.    Called Yvonne back to discuss. She states he has gained 6lbs in the last 4 days, and he has not increased his caloric intake. He has increase LE swelling and increased SOB with and without activity. Patient started taking PRN furosemide yesterday and second dose today without any change in weight or symptoms overnight.     Advised Yvonne I would reach out to Wen JENSEN to see if she wants to increase diuretic and get labs done prior to appt Weds and call her back. She verbalizes understanding.

## 2022-04-04 NOTE — TELEPHONE ENCOUNTER
Called Yvonne to notify. Increased prescription sent to [facility] pharmacy (new location given over the telephone). She states she has plenty of 20mg and 40mg tablets at home currently.

## 2022-04-06 NOTE — TELEPHONE ENCOUNTER
Leighton Johnson,  Patient was seen today by BE and he has ordered a EC-JOHNSON W/O CONT [[id number]]     to be scheduled for the patient.    Thank you.  Chyna Gaitan Ass't

## 2022-04-06 NOTE — PROGRESS NOTES
CARDIOLOGY STRUCTURAL HEART FOLLOWUP    PCP: Nicole Tse P.A.-C.  REFERRING: Dr. Yoon    1. Moderate to severe aortic stenosis    2. Hypoxia    3. Coronary artery disease due to calcified coronary lesion    4. LBBB (left bundle branch block)    5. First degree AV block    6. Frailty    7. Dyslipidemia        Delonte Alexander has ongoing hypoxia and a recent echocardiogram showing moderate aortic stenosis and minimal improvement following complex multivessel revascularization.  Given that her dominant component of his illnesses hypoxia and there is significant chest abnormalities on CT imaging I am going to contact his pulmonologist to verify her assessment regarding the etiology of disability.  I will also arrange a transesophageal echocardiogram.    Pending the results, we may proceed with transcatheter aortic valve replacement by a right carotid approach.  I met with the patient as well as his daughter Yvonne today discussed the risk benefits and alternatives to all of these.  They are both very much interested in moving forward with transcatheter aortic valve replacement and are agreeable with proceeding.    Follow up: 4 weeks    Chief Complaint   Patient presents with   • Aortic Stenosis     F/V Dx; Nonrheumatic aortic valve stenosis       History: Delonte Alexander is a 86 y.o. male with history of severe aortic stenosis with thoracic aortic aneurysm, severe peripheral artery disease presenting for follow-up of aortic stenosis.  He was hospitalized at [facility] in January with Covid and has not made a recovery.  He is now wheelchair dependent and hypoxic.  He was found to have complex cardiovascular disease as well as bilateral extensive calcified pleural plaques with effusions.  He has since undergone Impella supported PCI of the left main LAD and circumflex which was complicated by challenging femoral access as well as postoperative anemia requiring rehospitalization.  A CT scan is compatible with a  size 29 Stevens valve but prohibitive for femoral access, and instead would require access to the right internal jugular.  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    He has felt a bit improved following the PCI procedure there was severely debilitating remains class IV with symptoms.    ROS:   All other systems reviewed and negative except as per the HPI    PE:  /68 (BP Location: Left arm, Patient Position: Sitting, BP Cuff Size: Adult)   Pulse 78   Resp 16   Ht 1.829 m (6')   Wt 84.4 kg (186 lb)   SpO2 90%   BMI 25.23 kg/m²   Gen: frail  HEENT: Symmetric face. Anicteric sclerae. Moist mucus membranes  NECK: No JVD. No lymphadenopathy  CARDIAC: Regular, Normal S1, S2, +systolic murmur  VASCULATURE: carotids are normal bilaterally without bruit  RESP: Clear to auscultation bilaterally  ABD: Soft, non-tender, non-distended  EXT: 2+ lower extremity edema, no clubbing or cyanosis  SKIN: Warm and dry  NEURO: No gross deficits  PSYCH: Appropriate affect, participates in conversation    Past Medical History:   Diagnosis Date   • Breath shortness     with exertion   • CHF (congestive heart failure) (HCC)    • Dental disorder     upper denture   • Heart murmur    • High cholesterol    • Hyperlipidemia    • Hypertension      No Known Allergies  Outpatient Encounter Medications as of 4/6/2022   Medication Sig Dispense Refill   • furosemide (LASIX) 40 MG Tab Take 1 Tablet by mouth every morning. 30 Tablet 11   • Ciclopirox 0.77 % Gel Apply 1 Application topically 2 times a day. 30 g 3   • gabapentin (NEURONTIN) 100 MG Cap Take 1 Capsule by mouth every day for 180 days. 90 Capsule 1   • traZODone (DESYREL) 50 MG Tab Take 1 Tablet by mouth every evening for 180 days. 90 Tablet 1   • omeprazole (PRILOSEC) 40 MG delayed-release capsule Take 1 Capsule by mouth every day for 180 days. 90 Capsule 1   • atorvastatin (LIPITOR) 40 MG Tab Take 1 Tablet by mouth every day. 30 Tablet 11   • clopidogrel (PLAVIX) 75 MG Tab Take 1  Tablet by mouth every day. 30 Tablet 11   • ipratropium-albuterol (DUONEB) 0.5-2.5 (3) MG/3ML nebulizer solution Take 3 mL by nebulization 2 times a day.     • Fluticasone-Umeclidin-Vilant (TRELEGY) 100-62.5-25 MCG/INH AEROSOL POWDER, BREATH ACTIVATED inhalation Inhale 1 Puff every day.     • acetaminophen (TYLENOL) 325 MG Tab Take 650 mg by mouth every 6 hours as needed. Indications: Pain     • aspirin 81 MG EC tablet Take 81 mg by mouth every day.       No facility-administered encounter medications on file as of 2022.     Social History     Socioeconomic History   • Marital status:      Spouse name: Not on file   • Number of children: Not on file   • Years of education: Not on file   • Highest education level: Not on file   Occupational History   • Not on file   Tobacco Use   • Smoking status: Former Smoker     Packs/day: 2.00     Years: 25.00     Pack years: 50.00     Types: Cigarettes     Quit date:      Years since quittin.2   • Smokeless tobacco: Never Used   Vaping Use   • Vaping Use: Never used   Substance and Sexual Activity   • Alcohol use: Not Currently   • Drug use: Never   • Sexual activity: Not on file   Other Topics Concern   • Not on file   Social History Narrative   • Not on file     Social Determinants of Health     Financial Resource Strain: Not on file   Food Insecurity: Not on file   Transportation Needs: Not on file   Physical Activity: Not on file   Stress: Not on file   Social Connections: Not on file   Intimate Partner Violence: Not on file   Housing Stability: Not on file       Studies  No results found for: CHOLSTRLTOT, LDL, HDL, TRIGLYCERIDE    Lab Results   Component Value Date/Time    SODIUM 139 2022 02:38 AM    POTASSIUM 4.6 2022 02:38 AM    CHLORIDE 104 2022 02:38 AM    CO2 25 2022 02:38 AM    GLUCOSE 91 2022 02:38 AM    BUN 41 (H) 2022 02:38 AM    CREATININE 1.84 (H) 2022 02:38 AM    CREATININE 1.1 2008 09:24 AM      Lab Results   Component Value Date/Time    ALKPHOSPHAT 58 03/24/2022 10:25 AM    ASTSGOT 15 03/24/2022 10:25 AM    ALTSGPT 13 03/24/2022 10:25 AM    TBILIRUBIN 0.5 03/24/2022 10:25 AM        For this encounter I reviewed the following medical records BMP, Lipid profile and LFT         I spent 45 minutes with the patient and his daughter today and have conversed with numerous other healthcare professionals including cardiothoracic surgeon, pulmonary and other cardiologists within our practice.

## 2022-04-06 NOTE — TELEPHONE ENCOUNTER
Called Dr. Schmitz's office, spoke with Zoila, and left message requesting an MD to MD call for Dr. Acosta. Zoila states she would send a direct message to Dr. Schmitz regarding the request. Provided her with Dr. Acosta's cell phone per his request. Left my direct extension should they need anything further from me.

## 2022-04-07 NOTE — TELEPHONE ENCOUNTER
Patient is scheduled on 4-26-22 for a JOHNSON w/cons sed with Dr. Acosta. Patient was told to hold lasix AM day of procedure and to check in at 11:00 for a 1:00 procedure. H&P was done on 4-6-22 by Dr. Acosta. Pre admit to call patient.

## 2022-04-08 NOTE — TELEPHONE ENCOUNTER
Spoke to patient's daugther-in-law over phone. Results reviewed. Patient will follow up with MOIZ 04/12/22.

## 2022-04-08 NOTE — TELEPHONE ENCOUNTER
----- Message from Casey Acosta M.D. sent at 4/7/2022  6:17 PM PDT -----  The heart monitor looks good. No arrhythmias.

## 2022-04-10 PROBLEM — N18.30 STAGE 3 CHRONIC KIDNEY DISEASE: Status: ACTIVE | Noted: 2022-01-01

## 2022-04-10 PROBLEM — I48.91 ATRIAL FIBRILLATION WITH RAPID VENTRICULAR RESPONSE (HCC): Status: ACTIVE | Noted: 2022-01-01

## 2022-04-10 PROBLEM — G62.9 NEUROPATHY: Status: ACTIVE | Noted: 2022-01-01

## 2022-04-10 PROBLEM — J96.21 ACUTE ON CHRONIC RESPIRATORY FAILURE WITH HYPOXIA (HCC): Status: ACTIVE | Noted: 2022-01-01

## 2022-04-10 PROBLEM — L60.2 ONYCHOGRYPHOSIS: Status: ACTIVE | Noted: 2022-01-01

## 2022-04-10 PROBLEM — I31.39 PERICARDIAL EFFUSION: Status: ACTIVE | Noted: 2022-01-01

## 2022-04-10 PROBLEM — B35.1 ONYCHOMYCOSIS: Status: ACTIVE | Noted: 2022-01-01

## 2022-04-10 NOTE — CONSULTS
Cardiology Consult Note:    Nayla Drummond M.D.  Date & Time note created:    4/10/2022   5:43 AM     Referring MD:  Dr. Smith    Patient ID:   Name:             Delonte Alexander   YOB: 1935  Age:                 87 y.o.  male   MRN:               [id number]                                                             Chief Complaint / Reason for consult:  Atrial fibrillation with RVR.    History of Present Illness:    This is an 87-year-old man with planned TAVR procedure, peripheral arterial disease, hypertension, established coronary tear disease status post recent coronary stents to the circumflex and left main, hypertension, prior Covid infection, presented to the ER with abdominal pain and   palpitation and found to be in atrial fibrillation with rapid ventricular rate.  Patient also had CT scan which showed large pericardial effusion.  Stat bedside transthoracic echocardiogram was done which showed small pericardial effusion without evidence of hemodynamic compromise.  Cardiology has been consulted for further evaluation and care.    I personally interpreted all images of TTE, EKGs, TEEs and coronary angiogram.    Review of Systems:      Constitutional: Denies fevers, Denies weight changes  Eyes: Denies changes in vision, no eye pain  Ears/Nose/Throat/Mouth: Denies nasal congestion or sore throat   Cardiovascular: no chest pain, yes palpitations   Respiratory: no shortness of breath , Denies cough  Gastrointestinal/Hepatic: Denies abdominal pain, nausea, vomiting, diarrhea, constipation or GI bleeding   Genitourinary: Denies dysuria or frequency  Musculoskeletal/Rheum: Denies  joint pain and swelling   Skin: Denies rash  Neurological: Denies headache, confusion, memory loss or focal weakness/parasthesias  Psychiatric: denies mood disorder   Endocrine: Luciana thyroid problems  Heme/Oncology/Lymph Nodes: Denies enlarged lymph nodes, denies brusing or known bleeding disorder  All other  systems were reviewed and are negative (AMA/CMS criteria)                Past Medical History:   Past Medical History:   Diagnosis Date   • Breath shortness     with exertion   • CHF (congestive heart failure) (HCC)    • Dental disorder     upper denture   • Heart murmur    • High cholesterol    • Hyperlipidemia    • Hypertension      Active Hospital Problems    Diagnosis    • Coronary artery disease involving native coronary artery of native heart without angina pectoris [I25.10]      Priority: High   • Acute on chronic respiratory failure with hypoxia (HCC) [J96.21]      Priority: High   • Nonrheumatic aortic valve stenosis [I35.0]      Priority: High   • Renal insufficiency [N28.9]      Priority: Medium   • Dyslipidemia [E78.5]      Priority: Medium   • Chronic back pain [M54.9, G89.29]      Priority: Low   • Pericardial effusion [I31.3]    • Atrial fibrillation with rapid ventricular response (HCC) [I48.91]    • Volume overload [E87.70]        Past Surgical History:  Past Surgical History:   Procedure Laterality Date   • VT UPPER GI ENDOSCOPY,DIAGNOSIS N/A 3/26/2022    Procedure: GASTROSCOPY;  Surgeon: Dakota Gutierrez M.D.;  Location: [facility];  Service: Gastroenterology   • VT COLONOSCOPY,DIAGNOSTIC N/A 3/26/2022    Procedure: COLONOSCOPY;  Surgeon: Dakota Gutierrez M.D.;  Location: [facility];  Service: Gastroenterology   • HERNIA REPAIR         Hospital Medications:    Current Facility-Administered Medications:   •  aspirin EC (ECOTRIN) tablet 81 mg, 81 mg, Oral, DAILY, Benjamín Smith M.D.  •  atorvastatin (LIPITOR) tablet 40 mg, 40 mg, Oral, DAILY, Benjamín Smith M.D.  •  clopidogrel (PLAVIX) tablet 75 mg, 75 mg, Oral, DAILY, Benjamín Smith M.D.  •  umeclidinium-vilanterol (ANORO ELLIPTA) inhaler 1 Puff, 1 Puff, Inhalation, DAILY, Benjamín Smith M.D.  •  gabapentin (NEURONTIN) capsule 100 mg, 100 mg, Oral, DAILY, Benjamín Smith M.D.  •  ipratropium-albuterol (DUONEB)  nebulizer solution, 3 mL, Nebulization, Q4H PRN (RT), Benjamín Smith M.D.  •  omeprazole (PRILOSEC) capsule 40 mg, 40 mg, Oral, DAILY, Benjamín Smith M.D.  •  senna-docusate (PERICOLACE or SENOKOT S) 8.6-50 MG per tablet 2 Tablet, 2 Tablet, Oral, BID **AND** polyethylene glycol/lytes (MIRALAX) PACKET 1 Packet, 1 Packet, Oral, QDAY PRN **AND** magnesium hydroxide (MILK OF MAGNESIA) suspension 30 mL, 30 mL, Oral, QDAY PRN **AND** bisacodyl (DULCOLAX) suppository 10 mg, 10 mg, Rectal, QDAY PRN, Benjamín Smith M.D.  •  Respiratory Therapy Consult, , Nebulization, Continuous RT, Benjamín Smith M.D.  •  acetaminophen (Tylenol) tablet 650 mg, 650 mg, Oral, Q6HRS PRN, Benjamín Smith M.D.  •  [COMPLETED] digoxin (Lanoxin) injection 500 mcg, 500 mcg, Intravenous, Once, 500 mcg at 04/10/22 0302 **FOLLOWED BY** digoxin (Lanoxin) injection 250 mcg, 250 mcg, Intravenous, Once **FOLLOWED BY** digoxin (Lanoxin) injection 250 mcg, 250 mcg, Intravenous, Once **FOLLOWED BY** [START ON 4/11/2022] digoxin (LANOXIN) tablet 62.5 mcg, 0.0625 mg, Oral, QAM, Benjamín Smith M.D.  •  apixaban (ELIQUIS) tablet 2.5 mg, 2.5 mg, Oral, BID, Benjamín Smith M.D., 2.5 mg at 04/10/22 0302  •  ondansetron (ZOFRAN) syringe/vial injection 4 mg, 4 mg, Intravenous, Q4HRS PRN, Benjamín Smith M.D.  •  ondansetron (ZOFRAN ODT) dispertab 4 mg, 4 mg, Oral, Q4HRS PRN, Benjamín Smith M.D.  •  cefTRIAXone (Rocephin) syringe 2 g, 2 g, Intravenous, Q24HRS, Benjamín Smith M.D., 2 g at 04/10/22 0325  •  doxycycline monohydrate (ADOXA) tablet 100 mg, 100 mg, Oral, Q12HRS, Benjamín Smith M.D.  •  ferrous sulfate tablet 325 mg, 325 mg, Oral, QDAY with Breakfast, Benjamín Smith M.D.  •  fluticasone (FLOVENT HFA) 44 MCG/ACT inhaler 88 mcg, 2 Puff, Inhalation, DAILY, Benjamín Smith M.D.  •  furosemide (LASIX) injection 40 mg, 40 mg, Intravenous, Q DAY, Benjamín Smith M.D.    Current Outpatient Medications:  Medications Prior to  Admission   Medication Sig Dispense Refill Last Dose   • furosemide (LASIX) 40 MG Tab Take 1 Tablet by mouth every morning. 30 Tablet 11 UNKN at UNKN   • Ciclopirox 0.77 % Gel Apply 1 Application topically 2 times a day. 30 g 3 UNKN at UNKN   • gabapentin (NEURONTIN) 100 MG Cap Take 1 Capsule by mouth every day for 180 days. 90 Capsule 1 UNKN at UNKN   • traZODone (DESYREL) 50 MG Tab Take 1 Tablet by mouth every evening for 180 days. 90 Tablet 1 UNKN at UNKN   • omeprazole (PRILOSEC) 40 MG delayed-release capsule Take 1 Capsule by mouth every day for 180 days. 90 Capsule 1 UNKN at UNKN   • atorvastatin (LIPITOR) 40 MG Tab Take 1 Tablet by mouth every day. 30 Tablet 11 UNKN at UNKN   • clopidogrel (PLAVIX) 75 MG Tab Take 1 Tablet by mouth every day. 30 Tablet 11 UNKN at UNKN   • ipratropium-albuterol (DUONEB) 0.5-2.5 (3) MG/3ML nebulizer solution Take 3 mL by nebulization 2 times a day.   UNKN at UNKN   • Fluticasone-Umeclidin-Vilant (TRELEGY) 100-62.5-25 MCG/INH AEROSOL POWDER, BREATH ACTIVATED inhalation Inhale 1 Puff every day.   UNKN at UNKN   • acetaminophen (TYLENOL) 325 MG Tab Take 650 mg by mouth every 6 hours as needed. Indications: Pain   UNKN at UNKN   • aspirin 81 MG EC tablet Take 81 mg by mouth every day.   UNKN at UNKN       Medication Allergy:  No Known Allergies    Family History:  Family History   Problem Relation Age of Onset   • No Known Problems Mother    • Heart Disease Father    • Heart Disease Sister    • No Known Problems Sister        Social History:  Social History     Socioeconomic History   • Marital status:      Spouse name: Not on file   • Number of children: Not on file   • Years of education: Not on file   • Highest education level: Not on file   Occupational History   • Not on file   Tobacco Use   • Smoking status: Former Smoker     Packs/day: 2.00     Years: 25.00     Pack years: 50.00     Types: Cigarettes     Quit date:      Years since quittin.2   • Smokeless  tobacco: Never Used   Vaping Use   • Vaping Use: Never used   Substance and Sexual Activity   • Alcohol use: Not Currently   • Drug use: Never   • Sexual activity: Not on file   Other Topics Concern   • Not on file   Social History Narrative   • Not on file     Social Determinants of Health     Financial Resource Strain: Not on file   Food Insecurity: Not on file   Transportation Needs: Not on file   Physical Activity: Not on file   Stress: Not on file   Social Connections: Not on file   Intimate Partner Violence: Not on file   Housing Stability: Not on file         Physical Exam:  Vitals/ General Appearance:   Weight/BMI: Body mass index is 25.86 kg/m².  /55   Pulse 96   Temp 36.4 °C (97.5 °F) (Temporal)   Resp (!) 23   Ht 1.829 m (6')   Wt 86.5 kg (190 lb 11.2 oz)   SpO2 98%   Vitals:    04/10/22 0200 04/10/22 0235 04/10/22 0340 04/10/22 0400   BP:  (!) 92/63 104/63 123/55   Pulse: (!) 146 (!) 139 (!) 124 96   Resp: (!) 27 (!) 27 (!) 33 (!) 23   Temp:  36.4 °C (97.5 °F)     TempSrc:  Temporal     SpO2: 98% 97% 99% 98%   Weight:  86.5 kg (190 lb 11.2 oz)     Height:  1.829 m (6')       Oxygen Therapy:  Pulse Oximetry: 98 %, O2 (LPM): 6, O2 Delivery Device: Nasal Cannula    Constitutional:   No acute distress  HENMT:  Normocephalic, Atraumatic.  Eyes:  EOMI, No discharge.  Neck:  no JVD.  Cardiovascular:  Normal heart rate, Normal rhythm.  Extremitites with intact distal pulses, no cyanosis, or edema.  Lungs:  No respiratory distress.  Abdomen: Soft, No tenderness, No guarding, No rebound.  Skin: No significant rash.  Neurologic: Alert & oriented x 3, No focal deficits noted, cranial nerves II through X are intact.  Psychiatric: Affect normal, Judgment normal, Mood normal.      MDM (Data Review):     Records reviewed and summarized in current documentation    Lab Data Review:  Recent Results (from the past 24 hour(s))   EKG    Collection Time: 04/09/22  9:12 PM   Result Value Ref Range    Report        [facility] Emergency Dept.    Test Date:  2022  Pt Name:    SRIKANTH PAYNE                   Department: ER  MRN:        [id number]                      Room:       RD 04  Gender:     Male                         Technician: [id number]  :        1935                   Requested By:ER TRIAGE PROTOCOL  Order #:    [id number]                    Reading MD: DAVION CHAN MD    Measurements  Intervals                                Axis  Rate:       148                          P:  RI:                                      QRS:        -20  QRSD:       140                          T:          149  QT:         341  QTc:        536    Interpretive Statements  Atrial fibrillation with a ventricular to 148, QRS has poor R wave  progression,  J-point elevation anteriorly with no reciprocal changes, T waves inverted  laterally  Electronically Signed On 2022 21:43:58 PDT by DAVION CHAN MD     CBC with Differential    Collection Time: 22  9:25 PM   Result Value Ref Range    WBC 10.0 4.8 - 10.8 K/uL    RBC 3.08 (L) 4.70 - 6.10 M/uL    Hemoglobin 9.0 (L) 14.0 - 18.0 g/dL    Hematocrit 28.6 (L) 42.0 - 52.0 %    MCV 92.9 81.4 - 97.8 fL    MCH 29.2 27.0 - 33.0 pg    MCHC 31.5 (L) 33.7 - 35.3 g/dL    RDW 49.2 35.9 - 50.0 fL    Platelet Count 127 (L) 164 - 446 K/uL    MPV 13.1 (H) 9.0 - 12.9 fL    Neutrophils-Polys 83.90 (H) 44.00 - 72.00 %    Lymphocytes 3.10 (L) 22.00 - 41.00 %    Monocytes 10.70 0.00 - 13.40 %    Eosinophils 1.50 0.00 - 6.90 %    Basophils 0.50 0.00 - 1.80 %    Immature Granulocytes 0.30 0.00 - 0.90 %    Nucleated RBC 0.00 /100 WBC    Neutrophils (Absolute) 8.40 (H) 1.82 - 7.42 K/uL    Lymphs (Absolute) 0.31 (L) 1.00 - 4.80 K/uL    Monos (Absolute) 1.07 (H) 0.00 - 0.85 K/uL    Eos (Absolute) 0.15 0.00 - 0.51 K/uL    Baso (Absolute) 0.05 0.00 - 0.12 K/uL    Immature Granulocytes (abs) 0.03 0.00 - 0.11 K/uL    NRBC (Absolute) 0.00 K/uL   Comp Metabolic Panel     Collection Time: 04/09/22  9:25 PM   Result Value Ref Range    Sodium 135 135 - 145 mmol/L    Potassium 4.1 3.6 - 5.5 mmol/L    Chloride 95 (L) 96 - 112 mmol/L    Co2 28 20 - 33 mmol/L    Anion Gap 12.0 7.0 - 16.0    Glucose 116 (H) 65 - 99 mg/dL    Bun 35 (H) 8 - 22 mg/dL    Creatinine 1.69 (H) 0.50 - 1.40 mg/dL    Calcium 8.8 8.5 - 10.5 mg/dL    AST(SGOT) 11 (L) 12 - 45 U/L    ALT(SGPT) 20 2 - 50 U/L    Alkaline Phosphatase 77 30 - 99 U/L    Total Bilirubin 0.7 0.1 - 1.5 mg/dL    Albumin 3.7 3.2 - 4.9 g/dL    Total Protein 6.0 6.0 - 8.2 g/dL    Globulin 2.3 1.9 - 3.5 g/dL    A-G Ratio 1.6 g/dL   LIPASE    Collection Time: 04/09/22  9:25 PM   Result Value Ref Range    Lipase 15 11 - 82 U/L   proBrain Natriuretic Peptide, NT    Collection Time: 04/09/22  9:25 PM   Result Value Ref Range    NT-proBNP 9621 (H) 0 - 125 pg/mL   ESTIMATED GFR    Collection Time: 04/09/22  9:25 PM   Result Value Ref Range    GFR (CKD-EPI) 39 (A) >60 mL/min/1.73 m 2   LDH    Collection Time: 04/09/22  9:25 PM   Result Value Ref Range    LDH Total 208 107 - 266 U/L   MAGNESIUM    Collection Time: 04/09/22  9:25 PM   Result Value Ref Range    Magnesium 1.4 (L) 1.5 - 2.5 mg/dL   PHOSPHORUS    Collection Time: 04/09/22  9:25 PM   Result Value Ref Range    Phosphorus 3.1 2.5 - 4.5 mg/dL   TROPONIN    Collection Time: 04/09/22  9:25 PM   Result Value Ref Range    Troponin T 85 (H) 6 - 19 ng/L   URINALYSIS (UA)    Collection Time: 04/09/22 10:07 PM    Specimen: Urine, Clean Catch   Result Value Ref Range    Color Yellow     Character Clear     Specific Gravity 1.014 <1.035    Ph 5.0 5.0 - 8.0    Glucose Negative Negative mg/dL    Ketones Negative Negative mg/dL    Protein 30 (A) Negative mg/dL    Bilirubin Negative Negative    Urobilinogen, Urine 1.0 Negative    Nitrite Negative Negative    Leukocyte Esterase Negative Negative    Occult Blood Negative Negative    Micro Urine Req Microscopic    URINE MICROSCOPIC (W/UA)    Collection Time: 04/09/22  10:07 PM   Result Value Ref Range    WBC 0-2 (A) /hpf    RBC 2-5 (A) /hpf    Bacteria Negative None /hpf    Epithelial Cells Negative /hpf    Hyaline Cast 0-2 /lpf   EC-ECHOCARDIOGRAM LTD W/O CONT    Collection Time: 04/10/22 12:34 AM   Result Value Ref Range    Eject.Frac. MOD BP 51.28     Eject.Frac. MOD 4C 54.39     Eject.Frac. MOD 2C 43.83        Imaging/Procedures Review:    Chest Xray:  Reviewed    EKG:   As in HPI.     MDM (Assessment and Plan):     Active Hospital Problems    Diagnosis    • Coronary artery disease involving native coronary artery of native heart without angina pectoris [I25.10]      Priority: High   • Acute on chronic respiratory failure with hypoxia (HCC) [J96.21]      Priority: High   • Nonrheumatic aortic valve stenosis [I35.0]      Priority: High   • Renal insufficiency [N28.9]      Priority: Medium   • Dyslipidemia [E78.5]      Priority: Medium   • Chronic back pain [M54.9, G89.29]      Priority: Low   • Pericardial effusion [I31.3]    • Atrial fibrillation with rapid ventricular response (HCC) [I48.91]    • Volume overload [E87.70]          At this time, we will start anticoagulation therapy for new onset atrial fibrillation.  Watch out for bleeding as patient is already on aspirin and Plavix for recent coronary stents.  Overall, patient is indicated to undergo further JOHNSON and cardioversion for sinus restoration.  We will plan for that either today or tomorrow depending on anesthesia availability.    Thank you for referring this patient to our cardiology service.  We will follow patient with you.      Nayla Drummond MD.   Cardiology Inpatient Service.  [facility].  [phone].  [place], [place].

## 2022-04-10 NOTE — ASSESSMENT & PLAN NOTE
New diagnosis.  Status post digoxin-loading for rate control  Eliquis 2.5 mg twice daily was started after the thoracentesis  S/p cardioversion on 4/11  Now on PO amiodarone   Monitor on telemetry.

## 2022-04-10 NOTE — ED PROVIDER NOTES
ED Provider Note    CHIEF COMPLAINT  Chief Complaint   Patient presents with   • Shortness of Breath     Pt BIBA for increased SOB and weakness, was recently d/c from hospital for 4 stents placed, pt live-in nurse has been keeping track of vitals/wt at home and states that he is FVO 6lbs. Usually on 3L at baseline but went down to 60% pta on 6L NC   • Weakness     Increased weakness since dc from hospital   • Rapid Heart Beat     HR en route was noted to be in Afib RVR       HPI  Delonte Alexander is a 87 y.o. male who presents with abdominal distention.  The patient was here in the hospital about a week ago and was discharged after he had several stents placed.  The patient was found to have severe aortic stenosis and is awaiting evaluation for possible surgical intervention.  The patient presents the emerge department increased weakness since discharge as well as increased abdominal distention.  He has been compliant with his medication.  In route he is noted to be in atrial fibrillation with a rapid ventricular rate.  The patient is unaware of any history of atrial fibrillation.  He does not take any anticoagulants.    REVIEW OF SYSTEMS  See HPI for further details. All other systems are negative.     PAST MEDICAL HISTORY  Past Medical History:   Diagnosis Date   • Breath shortness     with exertion   • CHF (congestive heart failure) (HCC)    • Dental disorder     upper denture   • Heart murmur    • High cholesterol    • Hyperlipidemia    • Hypertension        FAMILY HISTORY  [unfilled]    SOCIAL HISTORY  Social History     Socioeconomic History   • Marital status:    Tobacco Use   • Smoking status: Former Smoker     Packs/day: 2.00     Years: 25.00     Pack years: 50.00     Types: Cigarettes     Quit date:      Years since quittin.2   • Smokeless tobacco: Never Used   Vaping Use   • Vaping Use: Never used   Substance and Sexual Activity   • Alcohol use: Not Currently   • Drug use: Never        SURGICAL HISTORY  Past Surgical History:   Procedure Laterality Date   • NH UPPER GI ENDOSCOPY,DIAGNOSIS N/A 3/26/2022    Procedure: GASTROSCOPY;  Surgeon: Dakota Gutierrez M.D.;  Location: SURGERY [facility];  Service: Gastroenterology   • NH COLONOSCOPY,DIAGNOSTIC N/A 3/26/2022    Procedure: COLONOSCOPY;  Surgeon: Dakota Gutierrez M.D.;  Location: SURGERY [facility];  Service: Gastroenterology   • HERNIA REPAIR         CURRENT MEDICATIONS  Home Medications    **Home medications have not yet been reviewed for this encounter**         ALLERGIES  No Known Allergies    PHYSICAL EXAM  VITAL SIGNS: /59   Pulse (!) 143   Temp 37.3 °C (99.1 °F) (Temporal)   Resp (!) 30   Ht 1.829 m (6')   Wt 84.4 kg (186 lb)   SpO2 98%   BMI 25.23 kg/m²       Constitutional: Ill in appearance.   HENT: Normocephalic, Atraumatic, Bilateral external ears normal, Oropharynx moist, No oral exudates, Nose normal.   Eyes: PERRLA, EOMI, Conjunctiva normal, No discharge.   Neck: Normal range of motion, No tenderness, Supple, No stridor.   Lymphatic: No lymphadenopathy noted.   Cardiovascular: Tachycardic heart rate, irregularly irregular rhythm, No murmurs, No rubs, No gallops.   Thorax & Lungs: Normal breath sounds, No respiratory distress, No wheezing, No chest tenderness.   Abdomen: Tympanic distention, mild diffuse tenderness, no rebound, no guarding  Skin: Warm, Dry, No erythema, No rash.   Back: No tenderness, No CVA tenderness.    Extremities: Intact distal pulses, No edema, No tenderness, No cyanosis, No clubbing.    Neurologic: Alert & oriented x 3, Normal motor function, Normal sensory function, No focal deficits noted.   Psychiatric: Affect normal, Judgment normal, Mood normal.     Results for orders placed or performed during the hospital encounter of 04/09/22   CBC with Differential   Result Value Ref Range    WBC 10.0 4.8 - 10.8 K/uL    RBC 3.08 (L) 4.70 - 6.10 M/uL    Hemoglobin 9.0 (L) 14.0 - 18.0 g/dL     Hematocrit 28.6 (L) 42.0 - 52.0 %    MCV 92.9 81.4 - 97.8 fL    MCH 29.2 27.0 - 33.0 pg    MCHC 31.5 (L) 33.7 - 35.3 g/dL    RDW 49.2 35.9 - 50.0 fL    Platelet Count 127 (L) 164 - 446 K/uL    MPV 13.1 (H) 9.0 - 12.9 fL    Neutrophils-Polys 83.90 (H) 44.00 - 72.00 %    Lymphocytes 3.10 (L) 22.00 - 41.00 %    Monocytes 10.70 0.00 - 13.40 %    Eosinophils 1.50 0.00 - 6.90 %    Basophils 0.50 0.00 - 1.80 %    Immature Granulocytes 0.30 0.00 - 0.90 %    Nucleated RBC 0.00 /100 WBC    Neutrophils (Absolute) 8.40 (H) 1.82 - 7.42 K/uL    Lymphs (Absolute) 0.31 (L) 1.00 - 4.80 K/uL    Monos (Absolute) 1.07 (H) 0.00 - 0.85 K/uL    Eos (Absolute) 0.15 0.00 - 0.51 K/uL    Baso (Absolute) 0.05 0.00 - 0.12 K/uL    Immature Granulocytes (abs) 0.03 0.00 - 0.11 K/uL    NRBC (Absolute) 0.00 K/uL   Comp Metabolic Panel   Result Value Ref Range    Sodium 135 135 - 145 mmol/L    Potassium 4.1 3.6 - 5.5 mmol/L    Chloride 95 (L) 96 - 112 mmol/L    Co2 28 20 - 33 mmol/L    Anion Gap 12.0 7.0 - 16.0    Glucose 116 (H) 65 - 99 mg/dL    Bun 35 (H) 8 - 22 mg/dL    Creatinine 1.69 (H) 0.50 - 1.40 mg/dL    Calcium 8.8 8.5 - 10.5 mg/dL    AST(SGOT) 11 (L) 12 - 45 U/L    ALT(SGPT) 20 2 - 50 U/L    Alkaline Phosphatase 77 30 - 99 U/L    Total Bilirubin 0.7 0.1 - 1.5 mg/dL    Albumin 3.7 3.2 - 4.9 g/dL    Total Protein 6.0 6.0 - 8.2 g/dL    Globulin 2.3 1.9 - 3.5 g/dL    A-G Ratio 1.6 g/dL   URINALYSIS (UA)    Specimen: Urine, Clean Catch   Result Value Ref Range    Color Yellow     Character Clear     Specific Gravity 1.014 <1.035    Ph 5.0 5.0 - 8.0    Glucose Negative Negative mg/dL    Ketones Negative Negative mg/dL    Protein 30 (A) Negative mg/dL    Bilirubin Negative Negative    Urobilinogen, Urine 1.0 Negative    Nitrite Negative Negative    Leukocyte Esterase Negative Negative    Occult Blood Negative Negative    Micro Urine Req Microscopic    LIPASE   Result Value Ref Range    Lipase 15 11 - 82 U/L   proBrain Natriuretic Peptide, NT    Result Value Ref Range    NT-proBNP 9621 (H) 0 - 125 pg/mL   ESTIMATED GFR   Result Value Ref Range    GFR (CKD-EPI) 39 (A) >60 mL/min/1.73 m 2   URINE MICROSCOPIC (W/UA)   Result Value Ref Range    WBC 0-2 (A) /hpf    RBC 2-5 (A) /hpf    Bacteria Negative None /hpf    Epithelial Cells Negative /hpf    Hyaline Cast 0-2 /lpf   EKG   Result Value Ref Range    Report       [facility] Emergency [facility].    Test Date:  2022  Pt Name:    SRIKANTH PAYNE                   Department: ER  MRN:        [id number]                      Room:       RD 04  Gender:     Male                         Technician: [id number]  :        [id number]                   Requested By:ER TRIAGE PROTOCOL  Order #:    [id number]                    Reading MD: DAVION CHAN MD    Measurements  Intervals                                Axis  Rate:       148                          P:  VT:                                      QRS:        -20  QRSD:       140                          T:          149  QT:         341  QTc:        536    Interpretive Statements  Atrial fibrillation with a ventricular to 148, QRS has poor R wave  progression,  J-point elevation anteriorly with no reciprocal changes, T waves inverted  laterally  Electronically Signed On 2022 21:43:58 PDT by DAVION CHAN MD         RADIOLOGY/PROCEDURES  CT-ABDOMEN-PELVIS WITH   Final Result      1.  Moderate RIGHT pericardial effusion, new since prior   2.  Calcified RIGHT pleural plaque, likely a marker of prior as best as exposure.   3.  Small LEFT pleural effusion   4.  Infrarenal abdominal aortic aneurysm, 3.8 x 5 cm   5.  Atherosclerosis   6.  BILATERAL calyceal stones      DX-CHEST-PORTABLE (1 VIEW)   Final Result      1.  Mild interstitial pulmonary edema   2.  Persistently enlarged cardiac silhouette   3.  Slight interval progression of BILATERAL airspace disease suspicious for pneumonia   4.  Small LEFT pleural effusion   5.  Possible small  RIGHT pleural effusion   6.  Calcified pleural plaques      EC-ECHOCARDIOGRAM COMPLETE W/O CONT    (Results Pending)         COURSE & MEDICAL DECISION MAKING  Pertinent Labs & Imaging studies reviewed. (See chart for details)  This an 87-year-old male who presents with abdominal pain and distention.  He is also has some increased work of breathing.  CT scan does show a concerning acute pericardial effusion.  Therefore stat echocardiogram has been ordered.  Otherwise there is no evidence of an obstructive process nor surgical process causing the abdominal pain and distention.  Laboratory analysis shows a stable anemia as well as stable renal sufficiency.  His BNP is slightly greater than prior and his chest x-ray does show some evidence of pulmonary edema.  On arrival he was in atrial fibrillation with a rapid ventricular rate therefore he did receive 10 mg of Cardizem and this was minimally effective therefore we gave a second bolus of the 10 mg of Cardizem and his blood pressure did drop into the 90s systolic.  Therefore hold off on any further calcium channel blockers as I suspect the tachycardia is more related to the pericardial effusion.  His blood pressure slowly responded without IV hydration.  I was reluctant to give IV fluids secondary to the heart failure.  I did contact cardiology for consultation.  Admit the patient to the hospital in guarded condition.    FINAL IMPRESSION  1.  Atrial fibrillation with rapid ventricular rate  2.  Acute pericardial effusion  3.  Critical aortic stenosis with pulmonary edema  4.  Stable anemia  5.  Stable renal insufficiency  6.  Critical care time 30 minutes    Disposition  The patient will be admitted in guarded condition         Electronically signed by: Jose Mosley M.D., 4/9/2022 9:23 PM

## 2022-04-10 NOTE — CARE PLAN
The patient is Watcher - Medium risk of patient condition declining or worsening    Shift Goals  Clinical Goals: hemodynamic stability  Patient Goals: breathe better  Family Goals: NIKKI    Progress made toward(s) clinical / shift goals:    Problem: Fall Risk  Goal: Patient will remain free from falls  Outcome: Not Progressing     Problem: Respiratory  Goal: Patient will achieve/maintain optimum respiratory ventilation and gas exchange  Outcome: Not Progressing     Problem: Urinary - Renal Perfusion  Goal: Ability to achieve and maintain adequate renal perfusion and functioning will improve  Outcome: Not Progressing     Problem: Venous Thromboembolism (VTE) Prevention  Goal: The patient will remain free from venous thromboembolism (VTE)  Outcome: Not Progressing     Problem: Nutrition  Goal: Patient's nutritional and fluid intake will be adequate or improve  Outcome: Not Progressing  Goal: Enteral nutrition will be maintained or improve  Outcome: Not Progressing  Goal: Enteral nutrition will be maintained or improve  Outcome: Not Progressing     Problem: Urinary Elimination  Goal: Establish and maintain regular urinary output  Outcome: Not Progressing     Problem: Bowel Elimination  Goal: Establish and maintain regular bowel function  Outcome: Not Progressing     Problem: Knowledge Deficit - Standard  Goal: Patient and family/care givers will demonstrate understanding of plan of care, disease process/condition, diagnostic tests and medications  Outcome: Progressing     Problem: Psychosocial  Goal: Patient's level of anxiety will decrease  Outcome: Progressing  Goal: Patient's ability to verbalize feelings about condition will improve  Outcome: Progressing  Goal: Patient's ability to re-evaluate and adapt role responsibilities will improve  Outcome: Progressing  Goal: Patient and family will demonstrate ability to cope with life altering diagnosis and/or procedure  Outcome: Progressing  Goal: Spiritual and cultural  needs incorporated into hospitalization  Outcome: Progressing     Problem: Hemodynamics  Goal: Patient's hemodynamics, fluid balance and neurologic status will be stable or improve  Outcome: Progressing     Problem: Risk for Aspiration  Goal: Patient's risk for aspiration will be absent or decrease  Outcome: Progressing       Patient is not progressing towards the following goals:      Problem: Fall Risk  Goal: Patient will remain free from falls  Outcome: Not Progressing     Problem: Respiratory  Goal: Patient will achieve/maintain optimum respiratory ventilation and gas exchange  Outcome: Not Progressing     Problem: Urinary - Renal Perfusion  Goal: Ability to achieve and maintain adequate renal perfusion and functioning will improve  Outcome: Not Progressing     Problem: Venous Thromboembolism (VTE) Prevention  Goal: The patient will remain free from venous thromboembolism (VTE)  Outcome: Not Progressing     Problem: Nutrition  Goal: Patient's nutritional and fluid intake will be adequate or improve  Outcome: Not Progressing  Goal: Enteral nutrition will be maintained or improve  Outcome: Not Progressing  Goal: Enteral nutrition will be maintained or improve  Outcome: Not Progressing     Problem: Urinary Elimination  Goal: Establish and maintain regular urinary output  Outcome: Not Progressing     Problem: Bowel Elimination  Goal: Establish and maintain regular bowel function  Outcome: Not Progressing

## 2022-04-10 NOTE — ASSESSMENT & PLAN NOTE
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  -With elevated BNP, pleural effusions, acute on chronic respiratory failure and peripheral edema will start diuresis while paying careful attention to blood pressure, TTE did not reveal any tamponade physiology and showed a small pericardial effusion,   -Thoracentesis with 500 ml off on 4/10.  -Repeat CXR this morning w/ worsening pulmonary edema/infilatrate,  -Lasix started

## 2022-04-10 NOTE — H&P
Hospital Medicine History & Physical Note    Date of Service  4/10/2022    Primary Care Physician  Nicole Tse P.A.-C.    Consultants  cardiology    Specialist Names: Dr. Drummond    Code Status  DNAR/DNI    Chief Complaint  Chief Complaint   Patient presents with   • Shortness of Breath     Pt BIBA for increased SOB and weakness, was recently d/c from hospital for 4 stents placed, pt live-in nurse has been keeping track of vitals/wt at home and states that he is FVO 6lbs. Usually on 3L at baseline but went down to 60% pta on 6L NC   • Weakness     Increased weakness since dc from hospital   • Rapid Heart Beat     HR en route was noted to be in Afib RVR       History of Presenting Illness  Delonte Alexander is a 87 y.o. 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  Patient was recently discharged on March 28, 2022 after elective cardiac catheterization complicated by right femoral artery bleeding.    Patient states since being discharged he has not quite returned to baseline, he has had continued weakness, dyspnea, exacerbated with exertion.  He has had poor p.o. intake.  He reports compliance with all medications.  He reports abdominal distention and today when his daughter noticed how distended/edematous he was she decided to bring him to the hospital for evaluation.  He does report productive cough as well.  Denies any headache, vision changes, chest pain or palpitations, no nausea vomiting abdominal pain dysuria or diarrhea.    Arrival to [facility] blood pressures 122/59 heart rate 143 respiratory rate 30 oxygen saturation 98% on 6 L nasal cannula, he is afebrile.   He was found to be in atrial fibrillation with rapid ventricular response.  CBC shows no leukocytosis, anemia 9.0, CHEM panel shows BUN 35 creatinine 1.69 normal liver function hypomagnesemia 1.4 troponin T 85 proBNP 9621, UA did not indicate infection, chest x-ray with interstitial pulmonary edema, enlarged cardiac silhouette, interval progression of bilateral airspace disease, small left pleural effusion and possible right pleural effusion, calcified pleural plaques.  CT chest shows moderate right pericardial effusion new since prior, calcified right pleural plaque, small left pleural effusions, infrarenal abdominal aortic aneurysm was 3.8 x 5 cm bilateral calyceal stones.  ERP consulted cardiology Dr. Drummond for stat TTE which was performed and was negative for tamponade physiology, revealed small pericardial effusion.  He was treated with IV diltiazem x2 with no effect on heart rate.  I discussed case with intensivist and patient was subsequently accepted for admission to the [facility].    I discussed the plan of care with patient, bedside RN and pharmacy.    Review of Systems  Review of Systems   Constitutional: Positive for malaise/fatigue. Negative for chills and fever.   HENT: Negative for congestion and sinus pain.    Eyes: Negative for photophobia and pain.   Respiratory: Positive for cough, sputum production and shortness of breath.    Cardiovascular: Positive for leg swelling. Negative for chest pain and palpitations.   Gastrointestinal: Negative for abdominal pain, nausea and vomiting.        +distention   Genitourinary: Negative for dysuria and urgency.   Musculoskeletal: Negative for falls and joint pain.   Neurological: Negative for sensory change, speech change and focal weakness.   Psychiatric/Behavioral: Negative for substance abuse. The patient is not nervous/anxious.        Past Medical History   has a past medical history of Breath shortness, CHF (congestive heart failure) (HCC), Dental disorder, Heart  murmur, High cholesterol, Hyperlipidemia, and Hypertension.    Surgical History   has a past surgical history that includes hernia repair; pr upper gi endoscopy,diagnosis (N/A, 3/26/2022); and pr colonoscopy,diagnostic (N/A, 3/26/2022).     Family History  family history includes Heart Disease in his father and sister; No Known Problems in his mother and sister.       Social History   reports that he quit smoking about 35 years ago. His smoking use included cigarettes. He has a 50.00 pack-year smoking history. He has never used smokeless tobacco. He reports previous alcohol use. He reports that he does not use drugs.    Allergies  No Known Allergies    Medications  Prior to Admission Medications   Prescriptions Last Dose Informant Patient Reported? Taking?   Ciclopirox 0.77 % Gel UNKN at UNKN Historical No No   Sig: Apply 1 Application topically 2 times a day.   Fluticasone-Umeclidin-Vilant (TRELEGY) 100-62.5-25 MCG/INH AEROSOL POWDER, BREATH ACTIVATED inhalation UNKN at UNKN Historical Yes No   Sig: Inhale 1 Puff every day.   acetaminophen (TYLENOL) 325 MG Tab UNKN at UNKN Historical Yes No   Sig: Take 650 mg by mouth every 6 hours as needed. Indications: Pain   aspirin 81 MG EC tablet UNKN at UNKN Historical Yes No   Sig: Take 81 mg by mouth every day.   atorvastatin (LIPITOR) 40 MG Tab UNKN at UNKN Historical No No   Sig: Take 1 Tablet by mouth every day.   clopidogrel (PLAVIX) 75 MG Tab UNKN at UNKN Historical No No   Sig: Take 1 Tablet by mouth every day.   furosemide (LASIX) 40 MG Tab UNKN at UNKN Historical No No   Sig: Take 1 Tablet by mouth every morning.   gabapentin (NEURONTIN) 100 MG Cap UNKN at UNKN Historical No No   Sig: Take 1 Capsule by mouth every day for 180 days.   ipratropium-albuterol (DUONEB) 0.5-2.5 (3) MG/3ML nebulizer solution UNKN at UNKN Historical Yes No   Sig: Take 3 mL by nebulization 2 times a day.   omeprazole (PRILOSEC) 40 MG delayed-release capsule UNKN at UNKN Historical No No    Sig: Take 1 Capsule by mouth every day for 180 days.   traZODone (DESYREL) 50 MG Tab UNKN at UNKN Historical No No   Sig: Take 1 Tablet by mouth every evening for 180 days.      Facility-Administered Medications: None       Physical Exam  Temp:  [36.4 °C (97.5 °F)-37.3 °C (99.1 °F)] 36.4 °C (97.5 °F)  Pulse:  [] 96  Resp:  [20-36] 23  BP: ()/(55-65) 123/55  SpO2:  [88 %-99 %] 98 %  Blood Pressure : 123/55   Temperature: 36.4 °C (97.5 °F)   Pulse: 96   Respiration: (!) 23   Pulse Oximetry: 98 %       Physical Exam  Vitals and nursing note reviewed.   Constitutional:       General: He is not in acute distress.     Appearance: He is ill-appearing.      Comments: 87-year-old male appears stated age alert and conversant sitting up on the edge of ED [facility], able to speak 5-7 word sentences without becoming breathless, nontoxic-appearing   HENT:      Head: Normocephalic and atraumatic.      Nose: Nose normal. No rhinorrhea.      Mouth/Throat:      Mouth: Mucous membranes are moist.      Pharynx: Oropharynx is clear.   Eyes:      General: No scleral icterus.     Extraocular Movements: Extraocular movements intact.      Conjunctiva/sclera: Conjunctivae normal.      Pupils: Pupils are equal, round, and reactive to light.   Neck:      Comments: +JVD  Cardiovascular:      Rate and Rhythm: Tachycardia present. Rhythm irregular.      Heart sounds: Murmur (systolic) heard.     No friction rub.   Pulmonary:      Breath sounds: Rhonchi and rales present.      Comments: Poor inspiratory effort, poor cough, rales diffusely worse in the bases, diminished breath sounds  Abdominal:      General: There is distension.      Palpations: Abdomen is soft.      Tenderness: There is no abdominal tenderness. There is no guarding or rebound.   Musculoskeletal:         General: Normal range of motion.      Cervical back: Normal range of motion and neck supple. No rigidity or tenderness.      Right lower leg: Edema present.      Left  lower leg: Edema present.      Comments: 3+ pitting edema bilateral lower extremities   Skin:     General: Skin is warm and dry.      Capillary Refill: Capillary refill takes less than 2 seconds.   Neurological:      General: No focal deficit present.      Mental Status: He is alert and oriented to person, place, and time.      Cranial Nerves: No cranial nerve deficit.      Sensory: No sensory deficit.      Motor: No weakness.      Coordination: Coordination normal.   Psychiatric:         Mood and Affect: Mood normal.         Behavior: Behavior normal.         Thought Content: Thought content normal.         Judgment: Judgment normal.         Laboratory:  Recent Labs     04/09/22 2125   WBC 10.0   RBC 3.08*   HEMOGLOBIN 9.0*   HEMATOCRIT 28.6*   MCV 92.9   MCH 29.2   MCHC 31.5*   RDW 49.2   PLATELETCT 127*   MPV 13.1*     Recent Labs     04/09/22 2125   SODIUM 135   POTASSIUM 4.1   CHLORIDE 95*   CO2 28   GLUCOSE 116*   BUN 35*   CREATININE 1.69*   CALCIUM 8.8     Recent Labs     04/09/22 2125   ALTSGPT 20   ASTSGOT 11*   ALKPHOSPHAT 77   TBILIRUBIN 0.7   LIPASE 15   GLUCOSE 116*         Recent Labs     04/09/22 2125   NTPROBNP 9621*         Recent Labs     04/09/22 2125   TROPONINT 85*       Imaging:  EC-ECHOCARDIOGRAM LTD W/O CONT   Final Result      CT-ABDOMEN-PELVIS WITH   Final Result   Addendum 1 of 1   Findings were communicated with DAVION CHAN via [employer] on    4/9/2022 11:30 PM.      Final      1.  Moderate RIGHT pericardial effusion, new since prior   2.  Calcified RIGHT pleural plaque, likely a marker of prior as best as exposure.   3.  Small LEFT pleural effusion   4.  Infrarenal abdominal aortic aneurysm, 3.8 x 5 cm   5.  Atherosclerosis   6.  BILATERAL calyceal stones      DX-CHEST-PORTABLE (1 VIEW)   Final Result      1.  Mild interstitial pulmonary edema   2.  Persistently enlarged cardiac silhouette   3.  Slight interval progression of BILATERAL airspace disease suspicious for  pneumonia   4.  Small LEFT pleural effusion   5.  Possible small RIGHT pleural effusion   6.  Calcified pleural plaques      US-THORACENTESIS PUNCTURE LEFT    (Results Pending)       X-Ray:  I have personally reviewed the images and compared with prior images. and My impression is:  interstitial pulmonary edema, enlarged cardiac silhouette, interval progression of bilateral airspace disease, small left pleural effusion and possible right pleural effusion, calcified pleural plaques  EKG:  I have personally reviewed the images and compared with prior images. and My impression is: Atrial fibrillation rapid ventricular response, poor R wave progression, J-point elevation without reciprocal changes    Assessment/Plan:  I anticipate this patient will require at least two midnights for appropriate medical management, necessitating inpatient admission.    * Pericardial effusion- (present on admission)  Assessment & Plan  Stat echo is negative for tamponade physiology.  Dr. Drummond from cardiology consulted for stat echo read and recommendations.  monitor blood pressure.    Atrial fibrillation with rapid ventricular response (HCC)- (present on admission)  Assessment & Plan  New diagnosis.  ERP consulted cardiology in ED, recommendations for digoxin for rate control and Eliquis 2.5 mg twice daily.  Monitor on telemetry.    Volume overload- (present on admission)  Assessment & Plan  Multifactorial with newly diagnosed atrial fibrillation with rapid ventricular response, severe AS.  With presence of small pericardial effusion, will start diuresis with close attention to blood pressure.    Coronary artery disease involving native coronary artery of native heart without angina pectoris- (present on admission)  Assessment & Plan  Recent PCI with 4 stents on 3/21/2022.  Cardiology following.  Continue aspirin, Plavix.  Admitted with pericardial effusion, no tamponade physiology as well as rapid A. fib.    Renal insufficiency- (present  on admission)  Assessment & Plan  CKD, baseline creatinine appears to be in the 1.3-1.6 range on chart review, 1.69 on admission.  Renal dose meds and avoid nephrotoxins  Monitor I&O's  Follow renal function      Chronic back pain- (present on admission)  Assessment & Plan  Pain control as needed    Nonrheumatic aortic valve stenosis- (present on admission)  Assessment & Plan  Previously was planned for TAVR, underwent recent angiography and received 4 stents on 3/21/2022.  Severe AS on prior TTE.  Cardiology consulted in the ED.    Dyslipidemia- (present on admission)  Assessment & Plan  Continue Lipitor    Acute on chronic respiratory failure with hypoxia (HCC)- (present on admission)  Assessment & Plan  -Multifactorial: Oxygen dependent since Covid infection in January 2022 + severe aortic stenosis plus moderate left-sided pleural effusion plus atrial fibrillation with rapid ventricular response plus possible pneumonia  -Patient reports baseline oxygen requirements are 3 to 4 L daily.  -RT consult, continuous pulse ox, incentive spirometry, duo nebs as needed.  -With elevated BNP, pleural effusions, acute on chronic respiratory failure and peripheral edema will start diuresis while paying careful attention to blood pressure, TTE did not reveal any tamponade physiology and showed a small pericardial effusion, though patients with pericardial effusions may be preload dependent.   -Possibly has PNA, checking procal, started on empiric abx.  -Thoracentesis ordered.         VTE prophylaxis: therapeutic anticoagulation with Eliquis

## 2022-04-10 NOTE — PROGRESS NOTES
Patient admitted to be T606 via monitored bed. Patient connected to monitor and positioned in bed. 3 side rails up, bed alarm on. Patient educated on fall precautions. Patient verbalized understanding.

## 2022-04-10 NOTE — PROGRESS NOTES
4 Eyes Skin Assessment Completed by GRAHAM Carver and GRAHAM Watson.    Head Scar  Ears WDL  Nose WDL  Mouth WDL  Neck WDL  Breast/Chest WDL  Shoulder Blades WDL  Spine WDL  (R) Arm/Elbow/Hand Bruising and Abrasion  (L) Arm/Elbow/Hand Bruising and Abrasion  Abdomen WDL  Groin WDL  Scrotum/Coccyx/Buttocks Redness  (R) Leg Bruising and Swelling  (L) Leg Bruising and Swelling  (R) Heel/Foot/Toe Ulcer(s) and Swelling  (L) Heel/Foot/Toe Swelling          Devices In Places ECG, Blood Pressure Cuff, Pulse Ox and Nasal Cannula      Interventions In Place Gray Ear Foams and Heel Mepilex    Possible Skin Injury Yes    Pictures Uploaded Into Epic Yes  Wound Consult Placed Yes  RN Wound Prevention Protocol Ordered Yes

## 2022-04-10 NOTE — CONSULTS
Consults   Patient presented with A. fib RVR, with signs of JVD and lower extremity edema, and finding on bedside echo pericardial effusion.  Patient with history of significant aortic stenosis.     Initially was concern for possible tamponade, cardiology was at bedside echo was performed no signs of hemodynamic compromise from tamponade at this time, plan for treatment of RVR, anticoagulation and possible JOHNSON/cardioversion in the next couple days.    Patient has remained hemodynamically stable discussed in depth with [facility] physician, stable for [facility] admission at this time.  Please feel free to contact ICU for reconsult if patient's status deteriorates or any significant concerns that patient may require ICU

## 2022-04-10 NOTE — PROGRESS NOTES
[facility] Medicine Daily Progress Note    Date of Service  4/10/2022    Chief Complaint  Delonte Alexander is a 87 y.o. male admitted 4/9/2022 with shortness of breath    Hospital Course  Catherine is a 87 y.o. 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  Patient was recently discharged on March 28, 2022 after elective cardiac catheterization complicated by right femoral artery bleeding.    Patient states since being discharged he has not quite returned to baseline, he has had continued weakness, dyspnea, exacerbated with exertion.  He has had poor p.o. intake.  He reports compliance with all medications.  He reports abdominal distention and today when his daughter noticed how distended/edematous he was she decided to bring him to the hospital for evaluation.  He does report productive cough as well.  Denies any headache, vision changes, chest pain or palpitations, no nausea vomiting abdominal pain dysuria or diarrhea.     Arrival to [facility] blood pressures 122/59 heart rate 143 respiratory rate 30 oxygen saturation 98% on 6 L nasal cannula, he is afebrile.  He was found to be in atrial fibrillation with rapid ventricular response.  CBC shows no leukocytosis, anemia 9.0, CHEM panel shows BUN 35 creatinine 1.69 normal liver function hypomagnesemia 1.4 troponin T 85 proBNP 9621, UA did not indicate infection, chest x-ray with interstitial pulmonary edema, enlarged cardiac silhouette, interval progression of bilateral airspace disease, small left pleural effusion and possible right pleural effusion, calcified pleural plaques.  CT chest shows moderate right pericardial  effusion new since prior, calcified right pleural plaque, small left pleural effusions, infrarenal abdominal aortic aneurysm was 3.8 x 5 cm bilateral calyceal stones.  ERP consulted cardiology Dr. Drummond for stat TTE which was performed and was negative for tamponade physiology, revealed small pericardial effusion.  He was treated with IV diltiazem x2 with no effect on heart rate.  I discussed case with intensivist and patient was subsequently accepted for admission to the [facility].       Interval Problem Update  4/10: Mr. Alexander was evaluated and examined in the [facility]. He remains in atrial fibrillation rates up to 120's. He is scheduled for a thoracentesis today. He is on 6 liters of oxygen. 4 grams of IV magnesium has been ordered. I discussed with Dr. Todd about a JOHNSON tomorrow. NPO midnight.    I have personally seen and examined the patient at bedside. I discussed the plan of care with bedside RN.    Consultants/Specialty  cardiology I discussed with Dr. Todd about his condition.    Code Status  DNAR/DNI    Disposition  Patient is not medically cleared for discharge.   Anticipate discharge to to home with close outpatient follow-up.  I have placed the appropriate orders for post-discharge needs.    Review of Systems  Review of Systems   Constitutional: Negative for chills and fever.   Respiratory: Positive for cough and shortness of breath.    Cardiovascular: Positive for palpitations.   All other systems reviewed and are negative.       Physical Exam  Temp:  [36.4 °C (97.5 °F)-37.3 °C (99.1 °F)] 36.4 °C (97.5 °F)  Pulse:  [] 142  Resp:  [20-36] 31  BP: ()/(55-70) 110/70  SpO2:  [88 %-100 %] 100 %    Physical Exam  Vitals and nursing note reviewed.   Constitutional:       Appearance: Normal appearance.   HENT:      Head: Normocephalic and atraumatic.      Mouth/Throat:      Mouth: Mucous membranes are dry.      Pharynx: Oropharynx is clear.   Eyes:      General: No scleral icterus.     Conjunctiva/sclera:  Conjunctivae normal.   Cardiovascular:      Rate and Rhythm: Tachycardia present. Rhythm irregular.      Heart sounds: Murmur heard.   Pulmonary:      Comments: Decreased at bilateral bases, increased work of breathing, 6 L nasal cannula oxygen  Abdominal:      General: There is no distension.      Tenderness: There is no abdominal tenderness.   Musculoskeletal:      Right lower leg: No edema.      Left lower leg: No edema.   Skin:     General: Skin is warm and dry.   Neurological:      General: No focal deficit present.      Mental Status: He is alert and oriented to person, place, and time.   Psychiatric:         Mood and Affect: Mood normal.         Behavior: Behavior normal.         Thought Content: Thought content normal.         Fluids    Intake/Output Summary (Last 24 hours) at 4/10/2022 0755  Last data filed at 4/10/2022 0325  Gross per 24 hour   Intake 20 ml   Output --   Net 20 ml       Laboratory  Recent Labs     04/09/22 2125   WBC 10.0   RBC 3.08*   HEMOGLOBIN 9.0*   HEMATOCRIT 28.6*   MCV 92.9   MCH 29.2   MCHC 31.5*   RDW 49.2   PLATELETCT 127*   MPV 13.1*     Recent Labs     04/09/22 2125   SODIUM 135   POTASSIUM 4.1   CHLORIDE 95*   CO2 28   GLUCOSE 116*   BUN 35*   CREATININE 1.69*   CALCIUM 8.8                   Imaging  EC-ECHOCARDIOGRAM LTD W/O CONT   Final Result      CT-ABDOMEN-PELVIS WITH   Final Result   Addendum 1 of 1   Findings were communicated with DAVION CHAN via Vo[employer] on    4/9/2022 11:30 PM.      Final      1.  Moderate RIGHT pericardial effusion, new since prior   2.  Calcified RIGHT pleural plaque, likely a marker of prior as best as exposure.   3.  Small LEFT pleural effusion   4.  Infrarenal abdominal aortic aneurysm, 3.8 x 5 cm   5.  Atherosclerosis   6.  BILATERAL calyceal stones      DX-CHEST-PORTABLE (1 VIEW)   Final Result      1.  Mild interstitial pulmonary edema   2.  Persistently enlarged cardiac silhouette   3.  Slight interval progression of BILATERAL  airspace disease suspicious for pneumonia   4.  Small LEFT pleural effusion   5.  Possible small RIGHT pleural effusion   6.  Calcified pleural plaques      US-THORACENTESIS PUNCTURE LEFT    (Results Pending)        Assessment/Plan  * Acute on chronic respiratory failure with hypoxia (HCC)- (present on admission)  Assessment & Plan  -Multifactorial: Oxygen dependent since Covid infection in January 2022 + severe aortic stenosis plus moderate left-sided pleural effusion plus atrial fibrillation with rapid ventricular response  -Patient reports baseline oxygen requirements are 3 to 4 L daily and currently on 6 L  -With elevated BNP, pleural effusions, acute on chronic respiratory failure and peripheral edema will start diuresis while paying careful attention to blood pressure, TTE did not reveal any tamponade physiology and showed a small pericardial effusion, though patients with pericardial effusions may be preload dependent.   Procalcitonin is negative and he does not convey symptoms consistent with pneumonia therefore antibiotics have been stopped  -Thoracentesis ordered.     Atrial fibrillation with rapid ventricular response (HCC)- (present on admission)  Assessment & Plan  New diagnosis.  ERP consulted cardiology in ED, recommendations for digoxin for rate control and Eliquis 2.5 mg twice daily after the thoracentesis  Monitor on telemetry.    Stage 3 chronic kidney disease (HCC)- (present on admission)  Assessment & Plan  Upon review of the records his creatinine is about his baseline and will certainly follow closely with a basic metabolic panel in the morning and follow his urine output    Pericardial effusion- (present on admission)  Assessment & Plan  Stat echo is negative for tamponade physiology.  Cardiology has consulted    Volume overload- (present on admission)  Assessment & Plan  Multifactorial with newly diagnosed atrial fibrillation with rapid ventricular response, severe AS.  With presence of  small pericardial effusion, will start diuresis with close attention to blood pressure.    Coronary artery disease involving native coronary artery of native heart without angina pectoris- (present on admission)  Assessment & Plan  Recent PCI with 4 stents on 3/21/2022.  Cardiology following.  Continue aspirin, Plavix.  Admitted with pericardial effusion, no tamponade physiology as well as rapid A. fib.    Renal insufficiency- (present on admission)  Assessment & Plan  CKD, baseline creatinine appears to be in the 1.3-1.6 range on chart review, 1.69 on admission.  Renal dose meds and avoid nephrotoxins  Monitor I&O's  Follow renal function      Chronic back pain- (present on admission)  Assessment & Plan  Pain control as needed    Nonrheumatic aortic valve stenosis- (present on admission)  Assessment & Plan  Previously was planned for TAVR, underwent recent angiography and received 4 stents on 3/21/2022.  Severe AS on prior TTE.  Cardiology consulted in the ED.    Dyslipidemia- (present on admission)  Assessment & Plan  Continue Lipitor       VTE prophylaxis: SCDs/TEDs    I have performed a physical exam and reviewed and updated ROS and Plan today (4/10/2022). In review of yesterday's note (4/9/2022), there are no changes except as documented above.

## 2022-04-10 NOTE — ASSESSMENT & PLAN NOTE
Previously was planned for TAVR, underwent recent angiography and received 4 stents on 3/21/2022.  Severe AS on prior TTE.  Cardiology on board   Pending TAVR

## 2022-04-10 NOTE — CONSULTS
Full consult will be done by morning team.  No tamponade.  Trial of digoxin for rate control.  Eliquis 2.5 mg bid for anticoagulation.    Nayla Drummond M.D.

## 2022-04-10 NOTE — RESPIRATORY CARE
COPD EDUCATION by COPD CLINICAL EDUCATOR  4/10/2022 at 6:00 AM by Rain Delgado, RRT     Patient reviewed by COPD education team. Patient does not have a history or diagnosis of COPD and is a non-smoker.  Therefore, patient does not qualify for the COPD program.

## 2022-04-10 NOTE — ED NOTES
"Completed per historical    \"Med Rec completed per patient's family   Allergies reviewed  No ORAL antibiotics in last 30 days\"  "

## 2022-04-10 NOTE — ASSESSMENT & PLAN NOTE
Multifactorial with newly diagnosed atrial fibrillation with rapid ventricular response, severe AS.  With presence of small pericardial effusion, will start diuresis with close attention to blood pressure.

## 2022-04-10 NOTE — ASSESSMENT & PLAN NOTE
CKD, baseline creatinine appears to be in the 1.3-1.6 range on chart review, 1.69 on admission.  His Cr went up to 2.4 on 4/11 thus stop diuretics  Creatinine worsened 2.44>>3.49, nephrology consulted   Per nephrology, ANDREW 2/2 ATN due to  hemodynamic instability vs contras induced nephrology. Cardiorenal syndrome on differential but less likely.   Lasix 80mg BID for worsening respiratory failure and infiltrate on CXR  Renal diet, fluid restrictio, strict IO, veltassa. For hyper K.   Renal dose meds and avoid nephrotoxins  Monitor I&O's  Follow renal function

## 2022-04-11 NOTE — PROCEDURES
Procedure performed: External DC Cardioversion    : Hudson Galeana MD    Assistant: None    Anesthesia: Per ICU MD    Indication: Atrial fibrillation    Preprocedural Diagnosis:   Atrial Fibrillation  Postprocedural Diagnosis: Sinus Rhythm    Description of procedure:    Informed consent was obtained. Defibrillator pads were placed in the anterior and posterior position.  Adequate sedation was obtained with assistance of ICU MD. A JOHNSON performed confirmed that there was NO left atrial thrombus. Patient was successfully cardioverted with 200 J synchronized biphasic energy into sinus rhythm.     Conclusion: Successful DC cardioversion     EBL: None    Complications: None apparent      Electronically signed: Hudson Galeana MD  Cardiologist [facility]

## 2022-04-11 NOTE — PROCEDURES
Date: 4/11/2022    Procedure: Conscious sedation for JOHNSON and cardioversion    Indication: Atrial fibrillation with critical aortic stenosis    Physician:  Rupa Caro M.D.    Consent:  Obtained by Cardiology    Procedure:  A time out occurred with the patient name, medical record number, allergies, and consent with right procedure and indication with bedside nurse and if able the patient. The patient was connected to a monitor and had continuous pulse oximeter and serial blood pressure measurement were done during the procedure. I performed the conscious sedation and was directly involved with administration of medication, monitoring airway, vital signs, preventing complications.  Administered of  2 mg of Versed and 100 mcg of Fentanyl total in titration fashion until achieving a level of moderate procedural sedation.  Patient tolerated procedure well with mild hypoxia from sedation which required 1 mg of Narcan as a reversal agent and was left in the care of his bedside nurse and respiratory therapy. Procedural sedation time equals 25 minutes which was separate from procedure time as described above.  Start time: 1410  Stop time: 1435    Rupa Caro MD  Pulmonary and Critical Care Medicine

## 2022-04-11 NOTE — PROGRESS NOTES
Ramses and Cardioversion at bedside with MD Hudson Galeana, patient converted to SR at 1421. Patient received 2mg of Narcan at end of procedure. Patient VSS and back on his 10L O2.

## 2022-04-11 NOTE — PROGRESS NOTES
[facility] Medicine Daily Progress Note    Date of Service  4/11/2022    Chief Complaint  Delonte Alexander is a 87 y.o. male admitted 4/9/2022 with shortness of breath    Hospital Course  Catherine is a 87 y.o. 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  Patient was recently discharged on March 28, 2022 after elective cardiac catheterization complicated by right femoral artery bleeding.    Patient states since being discharged he has not quite returned to baseline, he has had continued weakness, dyspnea, exacerbated with exertion.  He has had poor p.o. intake.  He reports compliance with all medications.  He reports abdominal distention and today when his daughter noticed how distended/edematous he was she decided to bring him to the hospital for evaluation.  He does report productive cough as well.  Denies any headache, vision changes, chest pain or palpitations, no nausea vomiting abdominal pain dysuria or diarrhea.     Arrival to [facility] blood pressures 122/59 heart rate 143 respiratory rate 30 oxygen saturation 98% on 6 L nasal cannula, he is afebrile.  He was found to be in atrial fibrillation with rapid ventricular response.  CBC shows no leukocytosis, anemia 9.0, CHEM panel shows BUN 35 creatinine 1.69 normal liver function hypomagnesemia 1.4 troponin T 85 proBNP 9621, UA did not indicate infection, chest x-ray with interstitial pulmonary edema, enlarged cardiac silhouette, interval progression of bilateral airspace disease, small left pleural effusion and possible right pleural effusion, calcified pleural plaques.  CT chest shows moderate right pericardial  effusion new since prior, calcified right pleural plaque, small left pleural effusions, infrarenal abdominal aortic aneurysm was 3.8 x 5 cm bilateral calyceal stones.  ERP consulted cardiology Dr. Drummond for stat TTE which was performed and was negative for tamponade physiology, revealed small pericardial effusion.  He was treated with IV diltiazem x2 with no effect on heart rate.  I discussed case with intensivist and patient was subsequently accepted for admission to the [facility].       Interval Problem Update  4/10: Mr. Alexander was evaluated and examined in the [facility]. He remains in atrial fibrillation rates up to 120's. He is scheduled for a thoracentesis today. He is on 6 liters of oxygen. 4 grams of IV magnesium has been ordered. I discussed with Dr. Todd about a JOHNSON tomorrow. NPO midnight.  4/11: Mr. Alexander was seen and evaluated in the [facility]. His Cr is up to 2.4 today. I discussed with Dr. Galeana and she will plan on JOHNSON and cardioverson if the stat COVID is negative. I met with his daughter at bedside and discussed his condition.     I have personally seen and examined the patient at bedside. I discussed the plan of care with bedside RN.    Consultants/Specialty  cardiology I discussed with Dr. Todd about his condition.    Code Status  DNAR/DNI    Disposition  Patient is not medically cleared for discharge.   Anticipate discharge to to home with close outpatient follow-up.  I have placed the appropriate orders for post-discharge needs.    Review of Systems  Review of Systems   Constitutional: Negative for chills and fever.   Respiratory: Positive for cough and shortness of breath.    Cardiovascular: Negative for palpitations.   Gastrointestinal:        He has been NPO   All other systems reviewed and are negative.       Physical Exam  Temp:  [36.1 °C (96.9 °F)-36.3 °C (97.3 °F)] 36.2 °C (97.2 °F)  Pulse:  [] 96  Resp:  [16-33] 26  BP: ()/(45-91) 103/65  SpO2:  [96 %-100 %] 96 %    Physical Exam  Vitals and  nursing note reviewed.   Constitutional:       Appearance: Normal appearance.   HENT:      Head: Normocephalic and atraumatic.      Mouth/Throat:      Mouth: Mucous membranes are dry.      Pharynx: Oropharynx is clear.   Eyes:      General: No scleral icterus.     Conjunctiva/sclera: Conjunctivae normal.   Cardiovascular:      Rate and Rhythm: Normal rate. Rhythm irregular.      Heart sounds: Murmur heard.   Pulmonary:      Comments: Decreased at bilateral bases, increased work of breathing, 2.5 L nasal cannula oxygen  Abdominal:      General: There is no distension.      Tenderness: There is no abdominal tenderness.   Musculoskeletal:      Cervical back: Normal range of motion and neck supple.      Right lower leg: No edema.      Left lower leg: No edema.   Skin:     General: Skin is warm and dry.   Neurological:      General: No focal deficit present.      Mental Status: He is alert.      Comments: He is a bit sleepy and an overall moderate historian   Psychiatric:         Mood and Affect: Mood normal.         Behavior: Behavior normal.         Thought Content: Thought content normal.         Fluids    Intake/Output Summary (Last 24 hours) at 4/11/2022 0749  Last data filed at 4/11/2022 0600  Gross per 24 hour   Intake 160 ml   Output 1000 ml   Net -840 ml       Laboratory  Recent Labs     04/09/22 2125 04/11/22  0245   WBC 10.0 13.1*   RBC 3.08* 3.72*   HEMOGLOBIN 9.0* 10.7*   HEMATOCRIT 28.6* 35.9*   MCV 92.9 96.5   MCH 29.2 28.8   MCHC 31.5* 29.8*   RDW 49.2 51.1*   PLATELETCT 127* 156*   MPV 13.1* 13.6*     Recent Labs     04/09/22 2125 04/11/22  0245   SODIUM 135 136   POTASSIUM 4.1 5.1   CHLORIDE 95* 95*   CO2 28 27   GLUCOSE 116* 92   BUN 35* 52*   CREATININE 1.69* 2.44*   CALCIUM 8.8 9.1     Recent Labs     04/10/22  1005   APTT 38.5*   INR 1.55*               Imaging  DX-CHEST-PORTABLE (1 VIEW)   Final Result      1.  No significant change      2.  Diffuse bilateral pulmonary airspace process consistent  with edema and/or infection      3.  Enlarged cardiac silhouette      4.  Possible small left pleural effusion      EC-ECHOCARDIOGRAM LTD W/O CONT   Final Result      CT-ABDOMEN-PELVIS WITH   Final Result   Addendum 1 of 1   Findings were communicated with DAVION CHAN via Voalte Me on    4/9/2022 11:30 PM.      Final      1.  Moderate RIGHT pericardial effusion, new since prior   2.  Calcified RIGHT pleural plaque, likely a marker of prior as best as exposure.   3.  Small LEFT pleural effusion   4.  Infrarenal abdominal aortic aneurysm, 3.8 x 5 cm   5.  Atherosclerosis   6.  BILATERAL calyceal stones      DX-CHEST-PORTABLE (1 VIEW)   Final Result      1.  Mild interstitial pulmonary edema   2.  Persistently enlarged cardiac silhouette   3.  Slight interval progression of BILATERAL airspace disease suspicious for pneumonia   4.  Small LEFT pleural effusion   5.  Possible small RIGHT pleural effusion   6.  Calcified pleural plaques      US-THORACENTESIS PUNCTURE LEFT    (Results Pending)   EC-JOHNSON W/O CONT    (Results Pending)   CL-CARDIOVERSION    (Results Pending)        Assessment/Plan  * Acute on chronic respiratory failure with hypoxia (HCC)- (present on admission)  Assessment & Plan  -Multifactorial: Oxygen dependent since Covid infection in January 2022 + severe aortic stenosis plus moderate left-sided pleural effusion plus atrial fibrillation with rapid ventricular response  -Patient reports baseline oxygen requirements are 3 to 4 L daily and went up to 6 L.  -With elevated BNP, pleural effusions, acute on chronic respiratory failure and peripheral edema will start diuresis while paying careful attention to blood pressure, TTE did not reveal any tamponade physiology and showed a small pericardial effusion,   -Thoracentesis with 500 ml off on 4/10. Repeat cxr ordered for 4/12.    Atrial fibrillation with rapid ventricular response (HCC)- (present on admission)  Assessment & Plan  New diagnosis.  Status  post digoxin-loading for rate control  Eliquis 2.5 mg twice daily was started after the thoracentesis  Cardioversion after JOHNSON on 4/11  Monitor on telemetry.  Normal EF on echo    Stage 3 chronic kidney disease (HCC)- (present on admission)  Assessment & Plan  Upon review of the records his creatinine is about his baseline and will certainly follow closely with a basic metabolic panel in the morning and follow his urine output    Pericardial effusion- (present on admission)  Assessment & Plan  Stat echo is negative for tamponade physiology.  Cardiology has consulted    Volume overload- (present on admission)  Assessment & Plan  Multifactorial with newly diagnosed atrial fibrillation with rapid ventricular response, severe AS.  With presence of small pericardial effusion, will start diuresis with close attention to blood pressure.    Coronary artery disease involving native coronary artery of native heart without angina pectoris- (present on admission)  Assessment & Plan  Recent PCI with 4 stents on 3/21/2022.  Cardiology following.  Continue aspirin, Plavix.      Renal insufficiency- (present on admission)  Assessment & Plan  CKD, baseline creatinine appears to be in the 1.3-1.6 range on chart review, 1.69 on admission.  His Cr went up to 2.4 on 4/11 thus stop diuretics  Check BMP in the morning and follow urine output  Renal dose meds and avoid nephrotoxins  Monitor I&O's  Follow renal function      Chronic back pain- (present on admission)  Assessment & Plan  Pain control as needed    Nonrheumatic aortic valve stenosis- (present on admission)  Assessment & Plan  Previously was planned for TAVR, underwent recent angiography and received 4 stents on 3/21/2022.  Severe AS on prior TTE.  Cardiology consulted in the ED.    Dyslipidemia- (present on admission)  Assessment & Plan  Continue Lipitor       VTE prophylaxis: SCDs/TEDs    I have performed a physical exam and reviewed and updated ROS and Plan today (4/11/2022).  In review of yesterday's note (4/10/2022), there are no changes except as documented above.

## 2022-04-11 NOTE — CARE PLAN
The patient is Watcher - Medium risk of patient condition declining or worsening    Shift Goals  Clinical Goals: hemodynamic stability, monitor oxygenation/SOB  Patient Goals: breathe better, sleep  Family Goals: eber    Progress made toward(s) clinical / shift goals:    Problem: Knowledge Deficit - Standard  Goal: Patient and family/care givers will demonstrate understanding of plan of care, disease process/condition, diagnostic tests and medications  Outcome: Met     Problem: Fall Risk  Goal: Patient will remain free from falls  Outcome: Met       Patient is not progressing towards the following goals:      Problem: Respiratory  Goal: Patient will achieve/maintain optimum respiratory ventilation and gas exchange  Outcome: Not Met  Note: Pt still requiring 4 L NC.

## 2022-04-11 NOTE — PROGRESS NOTES
Cardiology Follow-up Note    Date of Service:    4/11/2022      Consulting Physician: Hieu Tejada M.D.    Name:   Delonte Alexander   YOB: 1935  Age:   87 y.o.  male   MRN:   [id number]      HPI:    Delonte Alexander is a 87 y.o. male admitted on 4/10 with history of increased shortness of breath and weakness.  Patient with significant past medical history of moderate to severe aortic stenosis, coronary artery disease status post stenting 3/21, hypertension, peripheral arterial disease, history of Covid infection, dyslipidemia, CKD after discharge from hospital post stenting on 3/28 did not return to baseline continue to have weakness, dyspnea on exertion and increasing requirements of oxygen baseline 3 L increased to 6 L with poor oral intake associated with abdominal distention brought to hospital found to be in A. fib with RVR heart rate of 143, initial labs showed his BMP increased to 9621, chest x-ray with interstitial pulmonary edema, CT chest moderate right pericardial effusion, consulted cardiology bedside echo showed small pericardial effusion treated with IV diltiazem x2 with no effect admitted to ICU.  Started on apixaban, aspirin, clopidogrel.    Subjective:  Patient states he feels better but looks confused still in A. fib but rate controlled  Plan for bedside JOHNSON and cardioversion in ICU     All other review of systems reviewed and negative.      Past medical, surgical, social, and family history reviewed and unchanged from admission except as noted in assessment and plan.    Medications: Reviewed in MAR      No Known Allergies      Intake/Output Summary (Last 24 hours) at 4/11/2022 1144  Last data filed at 4/11/2022 0600  Gross per 24 hour   Intake 160 ml   Output 800 ml   Net -640 ml        Physical Exam  Body mass index is 26.01 kg/m².  /54   Pulse (!) 106   Temp 35.9 °C (96.6 °F) (Temporal)   Resp (!) 22   Ht 1.829 m (6')   Wt 87 kg (191 lb 12.8 oz)   SpO2 95%   Vitals:     04/11/22 0800 04/11/22 0805 04/11/22 0905 04/11/22 1100   BP: (!) 97/50 (!) 91/46 119/54 103/54   Pulse: 79 88 (!) 122 (!) 106   Resp: (!) 36 (!) 27 (!) 30 (!) 22   Temp:  35.9 °C (96.6 °F)     TempSrc:  Temporal     SpO2: 97% 96% 94% 95%   Weight:       Height:         Oxygen Therapy:  Pulse Oximetry: 95 %, O2 (LPM): 2.5, O2 Delivery Device: Silicone Nasal Cannula    General: Well appearing and in no apparent distress, on 3 L of oxygen  Eyes: nl conjunctiva  ENT: OP clear  Neck: JVP not elevated,   Lungs: normal respiratory effort, CTAB  Heart: S1-S2 normal no murmur heart rate irregularly irregular no pitting edema of the legs.   Abdomen: soft, non tender, non distended, no masses, normal bowel sounds.  Extremities/MSK: no clubbing, no cyanosis  Neurological: Alert, oriented, no focal sensory deficits  Skin: Warm extremities    Labs (personally reviewed and notable for):   Lab Results   Component Value Date/Time    SODIUM 136 04/11/2022 02:45 AM    POTASSIUM 5.1 04/11/2022 02:45 AM    CHLORIDE 95 (L) 04/11/2022 02:45 AM    CO2 27 04/11/2022 02:45 AM    GLUCOSE 92 04/11/2022 02:45 AM    BUN 52 (H) 04/11/2022 02:45 AM    CREATININE 2.44 (H) 04/11/2022 02:45 AM    CREATININE 1.1 02/29/2008 09:24 AM      Lab Results   Component Value Date/Time    WBC 13.1 (H) 04/11/2022 02:45 AM    RBC 3.72 (L) 04/11/2022 02:45 AM    HEMOGLOBIN 10.7 (L) 04/11/2022 02:45 AM    HEMATOCRIT 35.9 (L) 04/11/2022 02:45 AM    MCV 96.5 04/11/2022 02:45 AM    MCH 28.8 04/11/2022 02:45 AM    MCHC 29.8 (L) 04/11/2022 02:45 AM    MPV 13.6 (H) 04/11/2022 02:45 AM    NEUTSPOLYS 88.30 (H) 04/11/2022 02:45 AM    LYMPHOCYTES 2.20 (L) 04/11/2022 02:45 AM    MONOCYTES 8.70 04/11/2022 02:45 AM    EOSINOPHILS 0.10 04/11/2022 02:45 AM    BASOPHILS 0.20 04/11/2022 02:45 AM    ANISOCYTOSIS 1+ 04/11/2022 02:45 AM      No results found for: CHOLSTRLTOT, LDL, HDL, TRIGLYCERIDE    Lab Results   Component Value Date/Time    TROPONINT 116 (H) 04/10/2022 1150      Lab Results   Component Value Date/Time    NTPROBNP 9621 (H) 04/09/2022 2125       Radiology test Review:  DX-CHEST-PORTABLE (1 VIEW)   Final Result      1.  No significant change      2.  Diffuse bilateral pulmonary airspace process consistent with edema and/or infection      3.  Enlarged cardiac silhouette      4.  Possible small left pleural effusion      EC-ECHOCARDIOGRAM LTD W/O CONT   Final Result      CT-ABDOMEN-PELVIS WITH   Final Result   Addendum 1 of 1   Findings were communicated with DAVION CHAN via [employer] on    4/9/2022 11:30 PM.      Final      1.  Moderate RIGHT pericardial effusion, new since prior   2.  Calcified RIGHT pleural plaque, likely a marker of prior as best as exposure.   3.  Small LEFT pleural effusion   4.  Infrarenal abdominal aortic aneurysm, 3.8 x 5 cm   5.  Atherosclerosis   6.  BILATERAL calyceal stones      DX-CHEST-PORTABLE (1 VIEW)   Final Result      1.  Mild interstitial pulmonary edema   2.  Persistently enlarged cardiac silhouette   3.  Slight interval progression of BILATERAL airspace disease suspicious for pneumonia   4.  Small LEFT pleural effusion   5.  Possible small RIGHT pleural effusion   6.  Calcified pleural plaques      US-THORACENTESIS PUNCTURE LEFT    (Results Pending)   EC-JOHNSON W/O CONT    (Results Pending)   CL-CARDIOVERSION    (Results Pending)       Problem list:  Principal Problem:    Acute on chronic respiratory failure with hypoxia (HCC) POA: Yes  Active Problems:    Dyslipidemia POA: Yes    Nonrheumatic aortic valve stenosis POA: Yes    Chronic back pain POA: Yes    Renal insufficiency POA: Yes    Coronary artery disease involving native coronary artery of native heart without angina pectoris POA: Yes    Volume overload POA: Yes    Pericardial effusion POA: Yes    Atrial fibrillation with rapid ventricular response (HCC) POA: Yes    Stage 3 chronic kidney disease (HCC) POA: Yes  Resolved Problems:    * No resolved hospital problems.  *      Impression and Medical Decision Making:  # New onset A. fib with RVR   # History of recent CAD s/p stents  # Moderate nonrheumatic aortic stenosis -pending TAVR  # Acute on chronic ANDREW    Recommendations:  -- Keep n.p.o.  -- Obtain stat Covid -review with results for JOHNSON  -- For JOHNSON and cardioversion at bedside  -  Continue apixaban, aspirin, clopidogrel and atorvastatin  - monitor for bleeding  - To give IV fluids  mL  - May consider amiodarone post cardiversion  - maintain and replace K+/Mg+ >4/2        Please note that this dictation was created using voice recognition software. I have made every reasonable attempt to correct obvious errors, but it is possible there are errors of grammar and possibly content that I did not discover before finalizing the note.

## 2022-04-12 PROBLEM — G93.40 ACUTE ENCEPHALOPATHY: Status: ACTIVE | Noted: 2022-01-01

## 2022-04-12 NOTE — PROGRESS NOTES
Cardiology Follow-up Note    Date of Service:    4/12/2022      Consulting Physician: Hieu Tejada M.D.    Name:   Delonte Alexander   YOB: 1935  Age:   87 y.o.  male   MRN:   [id number]      HPI:    Delonte Alexander is a 87 y.o. male admitted on 4/10 with history of increased shortness of breath and weakness.  Patient with significant past medical history of moderate to severe aortic stenosis, coronary artery disease status post stenting 3/21, hypertension, peripheral arterial disease, history of Covid infection, dyslipidemia, CKD after discharge from hospital post stenting on 3/28 did not return to baseline continue to have weakness, dyspnea on exertion and increasing requirements of oxygen baseline 3 L increased to 6 L with poor oral intake associated with abdominal distention brought to hospital found to be in A. fib with RVR heart rate of 143, initial labs showed his BMP increased to 9621, chest x-ray with interstitial pulmonary edema, CT chest moderate right pericardial effusion, consulted cardiology bedside echo showed small pericardial effusion treated with IV diltiazem x2 with no effect admitted to ICU.  Started on apixaban, aspirin, clopidogrel.    Subjective:  4/11: Patient states he feels better but looks confused still in A. fib but rate controlled  Plan for bedside JOHNSON and cardioversion in ICU    4/12: Patient on questioning states he do not know how he feel, is requiring increased amount of oxygen and looks short of breath, since cardioversion on 4/11 patient is in sinus rhythm 71-89, chest x-ray this morning worsening pulmonary edema with moderate left pleural effusion, labs potassium 5.9, creatinine 3.49 from 2.44.  I/O not charted    Review of systems:  Constitutional negative for fever and chills  Cardiovascular: Denies chest pain or palpitations  Respiratory: Short of breath occasional cough  All other systems reviewed and are negative      Past medical, surgical, social, and  family history reviewed and unchanged from admission except as noted in assessment and plan.    Medications: Reviewed in MAR      No Known Allergies    No intake or output data in the 24 hours ending 04/12/22 0739     Physical Exam  Body mass index is 26.22 kg/m².  /47   Pulse 83   Temp 36.2 °C (97.1 °F) (Temporal)   Resp (!) 29   Ht 1.829 m (6')   Wt 87.7 kg (193 lb 5.5 oz)   SpO2 95%   Vitals:    04/12/22 0200 04/12/22 0300 04/12/22 0400 04/12/22 0600   BP: 131/45  (!) 97/38 134/47   Pulse: 79 81 71 83   Resp: 20 (!) 38 18 (!) 29   Temp:   36.2 °C (97.1 °F)    TempSrc:   Temporal    SpO2: 95% 88% 97% 95%   Weight:       Height:         Oxygen Therapy:  Pulse Oximetry: 95 %, O2 (LPM): 6, O2 Delivery Device: Silicone Nasal Cannula    General: Well appearing and in mild distress, on 6 L of oxygen  Eyes: nl conjunctiva  ENT: OP clear  Neck: JVP not elevated,   Lungs: Air entry decreased on the left lower zones, crackles diffusely  Heart: S1-S2 normal, systolic murmur+ grade 3/6,no pitting edema of the legs.   Abdomen: soft, non tender, non distended, no masses, normal bowel sounds.  Extremities/MSK: no clubbing, no cyanosis  Neurological: Alert, oriented, no focal sensory deficits  Skin: Warm extremities    Labs (personally reviewed and notable for):   Lab Results   Component Value Date/Time    SODIUM 133 (L) 04/12/2022 01:50 AM    POTASSIUM 5.9 (H) 04/12/2022 01:50 AM    CHLORIDE 94 (L) 04/12/2022 01:50 AM    CO2 27 04/12/2022 01:50 AM    GLUCOSE 167 (H) 04/12/2022 01:50 AM    BUN 69 (H) 04/12/2022 01:50 AM    CREATININE 3.49 (H) 04/12/2022 01:50 AM    CREATININE 1.1 02/29/2008 09:24 AM      Lab Results   Component Value Date/Time    WBC 13.1 (H) 04/11/2022 02:45 AM    RBC 3.72 (L) 04/11/2022 02:45 AM    HEMOGLOBIN 10.7 (L) 04/11/2022 02:45 AM    HEMATOCRIT 35.9 (L) 04/11/2022 02:45 AM    MCV 96.5 04/11/2022 02:45 AM    MCH 28.8 04/11/2022 02:45 AM    MCHC 29.8 (L) 04/11/2022 02:45 AM    MPV 13.6 (H)  04/11/2022 02:45 AM    NEUTSPOLYS 88.30 (H) 04/11/2022 02:45 AM    LYMPHOCYTES 2.20 (L) 04/11/2022 02:45 AM    MONOCYTES 8.70 04/11/2022 02:45 AM    EOSINOPHILS 0.10 04/11/2022 02:45 AM    BASOPHILS 0.20 04/11/2022 02:45 AM    ANISOCYTOSIS 1+ 04/11/2022 02:45 AM      No results found for: CHOLSTRLTOT, LDL, HDL, TRIGLYCERIDE    Lab Results   Component Value Date/Time    TROPONINT 116 (H) 04/10/2022 1150     Lab Results   Component Value Date/Time    NTPROBNP 9621 (H) 04/09/2022 2125       Radiology test Review:  DX-CHEST-PORTABLE (1 VIEW)   Final Result         1.  Pulmonary edema and/or infiltrates are identified, which appear somewhat increased since the prior exam.   2.  Moderate layering left pleural effusion   3.  Cardiomegaly   4.  Atherosclerosis      EC-JOHNSON W/O CONT         US-THORACENTESIS PUNCTURE LEFT   Final Result      1. Ultrasound guided left sided diagnostic and therapeutic thoracentesis.      2. 550 mL of fluid withdrawn.      DX-CHEST-PORTABLE (1 VIEW)   Final Result      1.  No significant change      2.  Diffuse bilateral pulmonary airspace process consistent with edema and/or infection      3.  Enlarged cardiac silhouette      4.  Possible small left pleural effusion      EC-ECHOCARDIOGRAM LTD W/O CONT   Final Result      CT-ABDOMEN-PELVIS WITH   Final Result   Addendum 1 of 1   Findings were communicated with DAVION CHAN via Vo[employer] on    4/9/2022 11:30 PM.      Final      1.  Moderate RIGHT pericardial effusion, new since prior   2.  Calcified RIGHT pleural plaque, likely a marker of prior as best as exposure.   3.  Small LEFT pleural effusion   4.  Infrarenal abdominal aortic aneurysm, 3.8 x 5 cm   5.  Atherosclerosis   6.  BILATERAL calyceal stones      DX-CHEST-PORTABLE (1 VIEW)   Final Result      1.  Mild interstitial pulmonary edema   2.  Persistently enlarged cardiac silhouette   3.  Slight interval progression of BILATERAL airspace disease suspicious for pneumonia   4.  Small  LEFT pleural effusion   5.  Possible small RIGHT pleural effusion   6.  Calcified pleural plaques      CL-CARDIOVERSION    (Results Pending)       Problem list:  Principal Problem:    Acute on chronic respiratory failure with hypoxia (HCC) POA: Yes  Active Problems:    Dyslipidemia POA: Yes    Nonrheumatic aortic valve stenosis POA: Yes    Chronic back pain POA: Yes    Renal insufficiency POA: Yes    Coronary artery disease involving native coronary artery of native heart without angina pectoris POA: Yes    Volume overload POA: Yes    Pericardial effusion POA: Yes    Atrial fibrillation with rapid ventricular response (HCC) POA: Yes    Stage 3 chronic kidney disease (HCC) POA: Yes  Resolved Problems:    * No resolved hospital problems. *      Impression and Medical Decision Making:  # New onset A. fib with RVR -resolved after cardioversion, will start on amiodarone for rhythm control,  # Acute on chronic hypoxic respiratory failure -possible volume overload  # History of recent CAD s/p stents  # Moderate to severe nonrheumatic aortic stenosis -pending TAVR  # Acute on chronic ANDREW -worsening  # Hyperkalemia    Recommendations:  - As patient requiring increased amount of oxygen and chest x-ray showing increased pulmonary edema will start on Lasix 40 mg daily and to check I/os strictly and daily weights  - Restrict sodium less than 2g   - Start on amiodarone 400 mg twice daily for rhythm control  - Continue apixaban, aspirin, clopidogrel and atorvastatin  - monitor for bleeding,   - maintain and replace K+/Mg+ >4/2  - May consider nephrology consult for worsening ANDREW and hyperkalemia      Please note that this dictation was created using voice recognition software. I have made every reasonable attempt to correct obvious errors, but it is possible there are errors of grammar and possibly content that I did not discover before finalizing the note.

## 2022-04-12 NOTE — CARE PLAN
The patient is Watcher - Medium risk of patient condition declining or worsening    Shift Goals  Clinical Goals: monitor [employer], wean O2  Patient Goals: Sleep, comfort  Family Goals: updates, education    Progress made toward(s) clinical / shift goals:     Hemodynamically stable overnight, decreased O2 needs since post cardioversion, no complaints of pain, having trouble clearing secretions.

## 2022-04-12 NOTE — CONSULTS
[facility] -  PROGRESS NOTE               Author: Hellen Gonzalez M.D. Date & Time: 4/12/2022  11:15 AM     HPI:  Mr. Alexander is 87-year-old male admitted on 4/10/2022 with complaint of worsening shortness of breath and weakness.  Patient with underlying history of moderate to severe aortic stenosis, CAD status post stenting 3/21,chronic hypoxic respiratory failure- on 3 to 4 L O2 at baseline secondary to COVID-19 in January 2022 and hypertension. On presentation to the emergency department, patient was found to be in A. fib with RVR with HR in 140's, oxygen saturation above 90% on 6 L O2.  Chest x-ray revealed pulmonary edema and moderate left-sided pleural effusion.  CT abdomen/pelvis with contrast showed new moderate sized pericardial effusion however TTE showed small pleural effusion with no evidence of cardiac tamponade.  Patient was admitted to hospital and started on IV Lasix. Underwent cardioversion with JOHNSON on 4/11 with successful conversion to sinus rhythm.  Thoracentesis performed on 4/11, despite diuresis and thoracentesis repeat chest x-ray on 4/12 showed worsening pulmonary edema/effusion.  On admission, labs were notable for creatinine of 1.69, BUN 35 however creatinine increased to 3.49 hence Nephrology is consulted. Patient remain hemodynamically stable, requiring up to 10 L O2 to saturate above 90%.  Patient appears to be in no acute distress and is able to follow simple commands but is not responding to questions.  Plan of care was discussed with son who is present at bedside.       PAST FAMILY HISTORY: Reviewed and Unchanged  SOCIAL HISTORY: Reviewed and Unchanged  CURRENT MEDICATIONS: Reviewed  IMAGING STUDIES: Reviewed      Review of systems (+10 systems) unremarkable except for concerns noted by patient or items listed.    Please see HPI for additional ROS.      PHYSICAL EXAM  In no acute distress  HEENT: Nontraumatic, PERRLA, EOMI  CVS: RRR, systolic murmur heard at  right upper sternal border.  Lungs: Decreased breath sounds in all lung fields, crackles on right field, no wheezing  Abdomen: Nondistended, nontender, bowel sounds present, no guarding, rigidity  Extremities: No lower extremity peripheral edema, pulses +2      Physical Activity: Not on file     VS:  BP (!) 132/38   Pulse 83   Temp 36.3 °C (97.3 °F) (Temporal)   Resp (!) 31   Ht 1.829 m (6')   Wt 87.7 kg (193 lb 5.5 oz)   SpO2 96%   BMI 26.22 kg/m²     Fluids:  No intake/output data recorded.    LABS:  Recent Results (from the past 24 hour(s))   EKG    Collection Time: 22  2:58 PM   Result Value Ref Range    Report       [facility]    Test Date:  2022  Pt Name:    SRIKANTH PAYNE                   Department: [facility]  MRN:        [id number]                      [place]       [facility] [place]:     Male                         Technician: YAYA  :        1935                   Requested By:SHREYA GONZALEZWAL  Order #:    [id number]                    Reading MD: Ronaldo Ma MD    Measurements  Intervals                                Axis  Rate:       77                           P:          31  MN:         252                          QRS:        81  QRSD:       142                          T:          -89  QT:         396  QTc:        449    Interpretive Statements  SINUS RHYTHM  FIRST DEGREE AV BLOCK  LEFT BUNDLE BRANCH BLOCK  Electronically Signed On 2022 15:32:35 PDT by Ronaldo Ma MD     Basic Metabolic Panel    Collection Time: 22  1:50 AM   Result Value Ref Range    Sodium 133 (L) 135 - 145 mmol/L    Potassium 5.9 (H) 3.6 - 5.5 mmol/L    Chloride 94 (L) 96 - 112 mmol/L    Co2 27 20 - 33 mmol/L    Glucose 167 (H) 65 - 99 mg/dL    Bun 69 (H) 8 - 22 mg/dL    Creatinine 3.49 (H) 0.50 - 1.40 mg/dL    Calcium 8.8 8.5 - 10.5 mg/dL    Anion Gap 12.0 7.0 - 16.0   ESTIMATED GFR    Collection Time: 22  1:50 AM   Result Value Ref Range    GFR (CKD-EPI) 16 (A) >60 mL/min/1.73 m 2        (click the triangle to expand results)      ASSESSMENT:  # Non-Oliguric ANDREW,  ?CKD stage 3.  -Baseline Cr 1.6-1.8 (since March 22, no lab in epic since 2008), increased to 3.49.   -CT abdomen/pelvis with contrast on 4/9 showed bilateral renal cyst and  calyceal stones  -Urinalysis showed proteinuria +30   -Few Hypotensive episodes with BP - 80's systolic   -Etiology likely ATN secondary to hypotension versus contrast induced nephropathy, Cardiorenal unlikely   -On lasix 40 mg daily, No lower extremity edema but worsening pulmonary edema on chest X-ray.        # Normocytic anemia   - Baseline 8-10, MCV 96   - B12, TSH jeanne   - Serum iron 39, % saturation 23 , ferritin 457   - Likely secondary to kidney disease     # Hyperkalemia   - K - 5.9   - secondary to ANDREW     #Leukocytosis   - Defer to Primary team.    #New onset atrial fibrillation  -On apixaban, amiodarone  -Cardiology following    #Acute on chronic respiratory failure with hypoxia  -Multifactorial, A. fib with RVR, severe aortic stenosis  -Cardiology following    #Pericardial effusion  -Hemodynamically stable, no evidence of cardiac tamponade on echo    #Coronary artery disease status post stenting  -On aspirin, Plavix.  Cardiology following      PLAN:  -No compelling indication for RRT at this time  -Start Lasix 80 mg twice daily  -Fluid restriction,   -Document strict in and out and daily weights  -Daily labs to monitor renal function and electrolytes  -Bladder scan once to rule out retention.  -Dose all meds per eGFR < 15  -Avoid nephro toxins  -Renal diet  -Start Veltassa 16.8 g daily, monitor daily potassium.  -Possible need for inpatient dialysis discussed with son,   -If no improvement or worsening kidney function, consider palliative consult for goals of care discussion.    Thank you for this consult, we will continue to follow.

## 2022-04-12 NOTE — DISCHARGE SUMMARY
Death Summary    Cause of Death  Acute cardiopulmonary arrest due to multiorgan system failure due to severe aortic stenosis due to cardiorenal syndrome.    Comorbid Conditions at the Time of Death  Principal Problem:    Acute on chronic respiratory failure with hypoxia (HCC) POA: Yes  Active Problems:    Dyslipidemia POA: Yes    Nonrheumatic aortic valve stenosis POA: Yes    Chronic back pain POA: Yes    Renal insufficiency POA: Yes    Coronary artery disease involving native coronary artery of native heart without angina pectoris POA: Yes    Volume overload POA: Yes    Pericardial effusion POA: Yes    Atrial fibrillation with rapid ventricular response (HCC) POA: Yes    Stage 3 chronic kidney disease (HCC) POA: Yes    Acute encephalopathy POA: Unknown  Resolved Problems:    * No resolved hospital problems. *      History of Presenting Illness and Hospital Course  This is a 87 y.o. male admitted 4/9/2022 with history of severe nonrheumatic aortic stenosis, coronary artery disease with recent stenting, left bundle branch block, chronic hypoxic respiratory failure on 3 to 4 L, recent history of COVID-19 infection, dyslipidemia, chronic kidney disease, extensive bilateral calcified pleural plaques with effusions, admitted with worsening dyspnea, peripheral edema.  The patient initially was attempted to be treated in a aggressive fashion, was found in rapid atrial fibrillation and referred to cardiology for evaluation, found to have bilateral interstitial infiltrates consistent with pulmonary edema versus residual COVID-19 changes, patient did have a pericardial effusion which was negative for tamponade physiology.  Cardiology opted to take the patient for a JOHNSON assisted cardioversion on 4/11, the patient post procedure had more difficulty with encephalopathy and mentation.  He underwent a thoracentesis on 4/10.  On 4/12 the patient was found with worsening renal function, mentation changes and increasing oxygen demand  with persistent bilateral pulmonary infiltrates consistent with pulmonary edema.  Nephrology was consulted and the patient was attempted to be treated with increasing doses of Lasix, the patient overall at that point did poorly, had worsening renal function and mentation, discussion was held in extent with the family at the bedside given the patient's recent decline in health and his wishes to not further aggressively treat the condition with poor overall expected outcome, family made the decision after lengthy discussion to proceed with comfort care measures only and allow natural death.  It was made clear that this would be in accordance to the patient's previously expressed wishes.  The patient was placed on comfort care measures and passes fairly shortly after in peace with family at the bedside.    Time of death [id number]

## 2022-04-12 NOTE — ASSESSMENT & PLAN NOTE
Likely Metabolic, Uremic, and Hypoxic in setting of worsening kidney function and increase oxygen requirement due to volume overload.   Lasix started   Nephrology consulted   Wean oxygen as tolerated   Monitor for electrolyte imbalance

## 2022-04-12 NOTE — PROGRESS NOTES
Patient with multiple Co-morbidities and worsening ANDREW and pulmonary edema/infilatrate, with increasing oxygen requirement and encephalopathic. After lengthy discussion with family members regarding patient prognosis and treatment, family members decided no heroic measure, and decided to proceed with comfort care measure only. Comfort care orders has been initiated.

## 2022-04-12 NOTE — PROGRESS NOTES
[facility] Medicine Daily Progress Note    Date of Service  4/12/2022    Chief Complaint  Delonte Alexander is a 87 y.o. male admitted 4/9/2022 with shortness of breath    Hospital Course  Catherine is a 87 y.o. 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  Patient was recently discharged on March 28, 2022 after elective cardiac catheterization complicated by right femoral artery bleeding.    Patient states since being discharged he has not quite returned to baseline, he has had continued weakness, dyspnea, exacerbated with exertion.  He has had poor p.o. intake.  He reports compliance with all medications.  He reports abdominal distention and today when his daughter noticed how distended/edematous he was she decided to bring him to the hospital for evaluation.  He does report productive cough as well.  Denies any headache, vision changes, chest pain or palpitations, no nausea vomiting abdominal pain dysuria or diarrhea.     Arrival to [facility] blood pressures 122/59 heart rate 143 respiratory rate 30 oxygen saturation 98% on 6 L nasal cannula, he is afebrile.  He was found to be in atrial fibrillation with rapid ventricular response.  CBC shows no leukocytosis, anemia 9.0, CHEM panel shows BUN 35 creatinine 1.69 normal liver function hypomagnesemia 1.4 troponin T 85 proBNP 9621, UA did not indicate infection, chest x-ray with interstitial pulmonary edema, enlarged cardiac silhouette, interval progression of bilateral airspace disease, small left pleural effusion and possible right pleural effusion, calcified pleural plaques.  CT chest shows moderate right pericardial  effusion new since prior, calcified right pleural plaque, small left pleural effusions, infrarenal abdominal aortic aneurysm was 3.8 x 5 cm bilateral calyceal stones.  ERP consulted cardiology Dr. Drummond for stat TTE which was performed and was negative for tamponade physiology, revealed small pericardial effusion.  He was treated with IV diltiazem x2 with no effect on heart rate.  I discussed case with intensivist and patient was subsequently accepted for admission to the [facility].       Interval Problem Update  4/10: Mr. Alexander was evaluated and examined in the [facility]. He remains in atrial fibrillation rates up to 120's. He is scheduled for a thoracentesis today. He is on 6 liters of oxygen. 4 grams of IV magnesium has been ordered. I discussed with Dr. Todd about a JOHNSON tomorrow. NPO midnight.  4/11: Mr. Alexander was seen and evaluated in the [facility]. His Cr is up to 2.4 today. I discussed with Dr. Galeana and she will plan on JOHNSON and cardioverson if the stat COVID is negative. I met with his daughter at bedside and discussed his condition.  4/12: Patient oxygen requirement increased from 6L oxymask to 9L oxymask, saturating > 90%. CXR showed pulmonary edema and/or infiltrates which appear somewhat increased since the prior exam. Had thoracentesis on 4/10. Cardioversion on 4/11 patient is in NSR 70-90HR. Worsening kidney function with Cr. 2.44>>3.49 and k of 5.9.  Nephrology consulted. Patient has flat affect. Alert when stimulated but not oriented.       Consultants/Specialty  cardiology   Nephrology     Code Status  DNAR/DNI    Disposition  [facility]    Review of Systems  Unable to obtain due to mental acuity.      Physical Exam  Temp:  [35.9 °C (96.7 °F)-36.6 °C (97.8 °F)] 36.6 °C (97.8 °F)  Pulse:  [] 80  Resp:  [10-38] 36  BP: ()/(34-70) 136/47  SpO2:  [80 %-99 %] 96 %    Physical Exam  Vitals and nursing note reviewed.   Constitutional:       Appearance: Normal appearance.   HENT:      Head: Normocephalic and  atraumatic.      Mouth/Throat:      Mouth: Mucous membranes are dry.      Pharynx: Oropharynx is clear.   Eyes:      General: No scleral icterus.     Conjunctiva/sclera: Conjunctivae normal.   Cardiovascular:      Rate and Rhythm: Normal rate. Rhythm irregular.      Heart sounds: Murmur heard.   Pulmonary:      Comments: Bilateral lower lungs crackles   Decreased at bilateral bases, increased work of breathing, on 6 L Oxymask   Abdominal:      General: There is no distension.      Tenderness: There is no abdominal tenderness.   Musculoskeletal:      Cervical back: Normal range of motion and neck supple.      Right lower leg: No edema.      Left lower leg: No edema.      Comments: Left lower extremity pedal edema 1+.  Ulcer on the medial malleolus at right ankle    Skin:     General: Skin is warm and dry.   Neurological:      General: No focal deficit present.      Mental Status: He is alert.      Comments: Sleepy, drowsy, w/ flat affect, poor historian    Psychiatric:         Behavior: Behavior normal.         Thought Content: Thought content normal.      Comments: Flat affect          Fluids  No intake or output data in the 24 hours ending 04/12/22 1303    Laboratory  Recent Labs     04/09/22 2125 04/11/22  0245   WBC 10.0 13.1*   RBC 3.08* 3.72*   HEMOGLOBIN 9.0* 10.7*   HEMATOCRIT 28.6* 35.9*   MCV 92.9 96.5   MCH 29.2 28.8   MCHC 31.5* 29.8*   RDW 49.2 51.1*   PLATELETCT 127* 156*   MPV 13.1* 13.6*     Recent Labs     04/09/22 2125 04/11/22 0245 04/12/22  0150   SODIUM 135 136 133*   POTASSIUM 4.1 5.1 5.9*   CHLORIDE 95* 95* 94*   CO2 28 27 27   GLUCOSE 116* 92 167*   BUN 35* 52* 69*   CREATININE 1.69* 2.44* 3.49*   CALCIUM 8.8 9.1 8.8     Recent Labs     04/10/22  1005   APTT 38.5*   INR 1.55*               Imaging  DX-CHEST-PORTABLE (1 VIEW)   Final Result         1.  Pulmonary edema and/or infiltrates are identified, which appear somewhat increased since the prior exam.   2.  Moderate layering left pleural  effusion   3.  Cardiomegaly   4.  Atherosclerosis      EC-JOHNSON W/O CONT   Final Result      US-THORACENTESIS PUNCTURE LEFT   Final Result      1. Ultrasound guided left sided diagnostic and therapeutic thoracentesis.      2. 550 mL of fluid withdrawn.      DX-CHEST-PORTABLE (1 VIEW)   Final Result      1.  No significant change      2.  Diffuse bilateral pulmonary airspace process consistent with edema and/or infection      3.  Enlarged cardiac silhouette      4.  Possible small left pleural effusion      EC-ECHOCARDIOGRAM LTD W/O CONT   Final Result      CT-ABDOMEN-PELVIS WITH   Final Result   Addendum 1 of 1   Findings were communicated with DAVION CHAN via [employer] on    4/9/2022 11:30 PM.      Final      1.  Moderate RIGHT pericardial effusion, new since prior   2.  Calcified RIGHT pleural plaque, likely a marker of prior as best as exposure.   3.  Small LEFT pleural effusion   4.  Infrarenal abdominal aortic aneurysm, 3.8 x 5 cm   5.  Atherosclerosis   6.  BILATERAL calyceal stones      DX-CHEST-PORTABLE (1 VIEW)   Final Result      1.  Mild interstitial pulmonary edema   2.  Persistently enlarged cardiac silhouette   3.  Slight interval progression of BILATERAL airspace disease suspicious for pneumonia   4.  Small LEFT pleural effusion   5.  Possible small RIGHT pleural effusion   6.  Calcified pleural plaques           Assessment/Plan      Acute encephalopathy  Assessment & Plan  Likely Metabolic, Uremic, and Hypoxic in setting of worsening kidney function and increase oxygen requirement due to volume overload.   Lasix started   Nephrology consulted   Wean oxygen as tolerated   Monitor for electrolyte imbalance    Stage 3 chronic kidney disease (HCC)  Assessment & Plan  Acute on chronic kidney disease   Worsening kidney function   Nephrology consulted     Atrial fibrillation with rapid ventricular response (HCC)  Assessment & Plan  New diagnosis.  Status post digoxin-loading for rate control  Eliquis  2.5 mg twice daily was started after the thoracentesis  S/p cardioversion on 4/11  Now on PO amiodarone   Monitor on telemetry.      Pericardial effusion  Assessment & Plan  Stat echo is negative for tamponade physiology.  Cardiology has consulted    Volume overload  Assessment & Plan  Multifactorial with newly diagnosed atrial fibrillation with rapid ventricular response, severe AS.  With presence of small pericardial effusion, will start diuresis with close attention to blood pressure.    Coronary artery disease involving native coronary artery of native heart without angina pectoris  Assessment & Plan  Recent PCI with 4 stents on 3/21/2022.  Cardiology following.  Continue aspirin, Plavix.      Renal insufficiency  Assessment & Plan  CKD, baseline creatinine appears to be in the 1.3-1.6 range on chart review, 1.69 on admission.  His Cr went up to 2.4 on 4/11 thus stop diuretics  Creatinine worsened 2.44>>3.49, nephrology consulted   Per nephrology, ANDREW 2/2 ATN due to  hemodynamic instability vs contras induced nephrology. Cardiorenal syndrome on differential but less likely.   Lasix 80mg BID for worsening respiratory failure and infiltrate on CXR  Renal diet, fluid restrictio, strict IO, veltassa. For hyper K.   Renal dose meds and avoid nephrotoxins  Monitor I&O's  Follow renal function      Chronic back pain  Assessment & Plan  Pain control as needed    Nonrheumatic aortic valve stenosis  Assessment & Plan  Previously was planned for TAVR, underwent recent angiography and received 4 stents on 3/21/2022.  Severe AS on prior TTE.  Cardiology on board   Pending TAVR     Dyslipidemia  Assessment & Plan  Continue Lipitor    * Acute on chronic respiratory failure with hypoxia (HCC)  Assessment & Plan  -Multifactorial: Oxygen dependent since Covid infection in January 2022 + severe aortic stenosis plus moderate left-sided pleural effusion plus atrial fibrillation with rapid ventricular response  -Patient reports  baseline oxygen requirements are 3 to 4 L daily and went up to 6 L.  -With elevated BNP, pleural effusions, acute on chronic respiratory failure and peripheral edema will start diuresis while paying careful attention to blood pressure, TTE did not reveal any tamponade physiology and showed a small pericardial effusion,   -Thoracentesis with 500 ml off on 4/10.  -Repeat CXR this morning w/ worsening pulmonary edema/infilatrate,  -Lasix started         VTE prophylaxis: On eliquis     Update  After additional discussion with family members and the patient's worsening condition with respiratory difficulty, pulmonary edema, apparent aspiration, obtundation and additional organ failure given his anuria and renal failure, family had conferenced and felt that the patient at this time is best served with comfort care and hospice approach.  Unfortunate the patient is too ill to be transported home at this time at home hospice  Comfort care will be carried out while the patient is hospitalized  Expect fairly short course  Extended time spent with family at the bedside discussing prognosis, diagnosis and further treatment options  Time spent 45 minutes in end-of-life care  The patient is critically ill at this time  Other physicians have been updated of the patient's change in course of treatment

## 2022-04-14 NOTE — DOCUMENTATION QUERY
[facility]                                                                       Query Response Note      PATIENT:               SRIKANTH PAYNE  ACCT #:                  [id number]  MRN:                     [id number]  :                      1935  ADMIT DATE:       2022 9:05 PM  DISCH DATE:        2022 3:03 PM  RESPONDING  PROVIDER #:        [id number]           QUERY TEXT:    Acute encephalopathy is documented in the Discharge Summary. Please clarify the type.    NOTE:  If an appropriate response is not listed below, please respond with a new note.    The patient's Clinical Indicators include:  Per Progress Note   -Acute encephalopathy   -Likely Metabolic, Uremic, and Hypoxic in setting of worsening kidney function and increase oxygen requirement due to volume overload    Per D/C Summary   -Acute encephalopathy   -On  the patient was found with worsening renal function, mentation changes and increasing oxygen demand with persistent bilateral pulmonary infiltrates consistent with pulmonary edema    Treatment:   Cardiology & Nephrology Consultations, Cardizem, Cardioversion, amiodarone, IV Lasix, strict I&O, supplemental O2, & transition to comfort care measures    Risk Factors:   Acute on chronic respiratory failure, atrial fibrillation with RVR, pericardial effusion, volume overload, severe aortic stenosis, & acute kidney injury on CKD 3    Thank You,  Jen Awan RN BSN  Clinical   Connect via [employer]  Options provided:   -- Metabolic encephalopathy   -- Anoxic encephalopathy   -- Other explanation of clinical findings, (Please specify other explanation of clinical findings)   -- Unable to determine      Query created by: Jen Awan on 2022 7:14 AM    RESPONSE TEXT:    Metabolic encephalopathy       QUERY TEXT:    Acute kidney injury secondary to ATN and MALENA has been documented in  the Nephrology Consultation. Per the subsequent Discharge Summary; acute kidney injury, ATN, and MALENA are not longer being documented. Please clarify the status of this condition.     NOTE:  If an appropriate response is not listed below, please respond with a new note.    The patient's clinical indicators include:  Per Nephrology Consultation 4/12  -ANDREW 2/2 ATN from hemodynamic instability and MALENA.   -CRS is a possibility but low on differential.   -patient needs Diuresis for worsening respiratory failure and Infiltrates on CXR which would work for CRS as well.     Per Hospital Medicine Progress Note 4/1  -Renal insufficiency   -CKD, baseline creatinine appears to be in the 1.3-1.6 range on chart review, 1.69 on admission.   -creatinine worsened 2.44>>3.49, nephrology consulted   -Per nephrology, ANDREW 2/2 ATN due to hemodynamic instability vs contrast induced nephrology.   -Cardiorenal syndrome on differential but less likely.     Per D/C Summary   -Acute cardiopulmonary arrest due to multiorgan system failure due to severe aortic stenosis due to cardiorenal syndrome  -Renal insufficiency POA: Yes   -On 4/12 the patient was found with worsening renal function, mentation changes and increasing oxygen demand with persistent bilateral pulmonary infiltrates consistent with pulmonary edema    Treatment:   Cardiology & Nephrology Consultations, IV Lasix, fluid restriction, strict I&O, Cardizem, Cardioversion, amiodarone, supplemental O2, & transition to comfort care measures    Risk Factors:   Acute on chronic respiratory failure, atrial fibrillation with RVR, pericardial effusion, volume overload, severe aortic stenosis, & acute kidney injury on CKD 3    Thank You,  Jen Awan RN BSN  Clinical   Connect via [employer]  Options provided:   -- Acute kidney injury with ATN and MALENA is a relevant clinical diagnosis   -- Insignificant finding/No effect on this patient's stay and/or treatment    -- Other explanation of clinical finding, (Please specify other explanation of clinical findings)   -- Unable to determine      Query created by: Jen Awan on 4/13/2022 7:28 AM    RESPONSE TEXT:    Acute kidney injury with ATN and MALENA is a relevant clinical diagnosis          Electronically signed by:  LESLEY BECKWITH MD 4/14/2022 11:20 AM

## 2022-04-15 LAB
BACTERIA FLD AEROBE CULT: NORMAL
GRAM STN SPEC: NORMAL
SIGNIFICANT IND 70042: NORMAL
SITE SITE: NORMAL
SOURCE SOURCE: NORMAL

## 2022-04-22 ENCOUNTER — APPOINTMENT (OUTPATIENT)
Dept: ADMISSIONS | Facility: MEDICAL CENTER | Age: 87
End: 2022-04-22
Payer: MEDICARE

## 2022-04-26 ENCOUNTER — APPOINTMENT (OUTPATIENT)
Dept: CARDIOLOGY | Facility: MEDICAL CENTER | Age: 87
End: 2022-04-26
Attending: INTERNAL MEDICINE
Payer: MEDICARE

## 2022-05-24 NOTE — DOCUMENTATION QUERY
[facility]                                                                       Query Response Note      PATIENT:               SRIKANTH PAYNE  ACCT #:                  [id number]  MRN:                     [id number]  :                      1935  ADMIT DATE:       2022 9:05 PM  DISCH DATE:        2022 3:03 PM  RESPONDING  PROVIDER #:        [id number]           QUERY TEXT:    A past medical history of Congestive Heart Failure is documented in the Medical Record. Please clarify the status of this condition.    NOTE:  If an appropriate response is not listed below, please respond with a new note.      The patient's Clinical Indicators include:  Per H&P   -has a past medical history of Breath shortness, CHF (congestive heart failure)   -Volume overload  --Multifactorial with newly diagnosed atrial fibrillation with rapid ventricular response, severe AS.   --With presence of small pericardial effusion    Per D/C Summary   -Acute cardiopulmonary arrest due to multiorgan system failure due to severe aortic stenosis due to cardiorenal syndrome  -was found in rapid atrial fibrillation and referred to cardiology for evaluation,   -found to have bilateral interstitial infiltrates consistent with pulmonary edema versus residual COVID-19 changes,   -patient did have a pericardial effusion which was negative for tamponade physiology    Results Review:   NT-proBNP 962  Tropoinin T 85, 104, 116  CXR :  1.  Mild interstitial pulmonary edema. 3.  Slight interval progression of BILATERAL airspace disease suspicious for pneumonia. 4.  Small LEFT pleural effusion. 5.  Possible small RIGHT pleural effusion  CXR : 1.  Pulmonary edema and/or infiltrates are identified, which appear somewhat increased since the prior exam. 2.  Moderate layering left pleural effusion  Echocardiogram 3/25/22:   The left ventricular ejection fraction is visually  estimated to be 55%.  A reliable estimation of diastolic function cannot be made due to mitral valve disease.  The right ventricle is normal in size and systolic function.  Severely dilated left atrium.  Heavily calcified mitral annulus.  Moderate aortic valve stenosis.    Treatment:   IV Lasix, left thoracentesis of 550ml, I&O, cardioversion, Digoxin, Cardizem, amiodarone, IV magnesium, CXR, NT-proBNP, supplemental O2 via oxymask, RT protocol, & transition to comfort care measures    Risk Factors:   Volume overload, pericardial effusion, pleural effusion, acute on chronic respiratory failure, atrial fibrillation, aortic stenosis, & acute kidney injury with ATN on CKD 3, & cardiorenal syndrome    Thank You,  Jen Awan RN BSN  Clinical   Connect via [employer]  Options provided:   -- Acute Diastolic heart failure   -- Chronic Diastolic heart failure   -- Acute on Chronic Diastolic heart failure   -- Heart Failure ruled out   -- Other explanation of clinical findings, (Please specify other explanation of clinical findings)   -- Unable to determine      Query created by: Jen Awan on 5/23/2022 12:11 PM    RESPONSE TEXT:    Unable to determine          Electronically signed by:  LESLEY BECKWITH MD 5/24/2022 1:16 PM

## 2023-04-26 NOTE — TELEPHONE ENCOUNTER
JOHNSON has been cancelled for 3-31-22 with Dr. Acosta.   Length To Time In Minutes Device Was In Place: 10

## 2023-05-15 NOTE — TELEPHONE ENCOUNTER
Leighton Johnson,  Patient was seen today by BE and he has ordered a EC-JOHNSON W/O CONT [[id number]]     to be scheduled for the patient.    Thank you.  Chyna Gaitan Ass't   Bilateral Rotation Flap Text: The defect edges were debeveled with a #15 scalpel blade. Given the location of the defect, shape of the defect and the proximity to free margins a bilateral rotation flap was deemed most appropriate. Using a sterile surgical marker, an appropriate rotation flap was drawn incorporating the defect and placing the expected incisions within the relaxed skin tension lines where possible. The area thus outlined was incised deep to adipose tissue with a #15 scalpel blade. The skin margins were undermined to an appropriate distance in all directions utilizing iris scissors. Following this, the designed flap was carried over into the primary defect and sutured into place.

## (undated) DEVICE — SET EXTENSION WITH 2 PORTS (48EA/CA) ***PART #2C8610 IS A SUBSTITUTE*****

## (undated) DEVICE — CANNULA O2 COMFORT SOFT EAR ADULT 7 FT TUBING (50/CA)

## (undated) DEVICE — FORCEP RADIAL JAW 4 STANDARD CAPACITY W/NEEDLE 240CM (40EA/BX)

## (undated) DEVICE — ELECTRODE 850 FOAM ADHESIVE - HYDROGEL RADIOTRNSPRNT (50/PK)

## (undated) DEVICE — MASK WITH FACE SHIELD (25/BX 4BX/CA)

## (undated) DEVICE — TRAP POLYP E-TRAP (25EA/BX)

## (undated) DEVICE — CONTAINER SPECIMEN BAG OR - STERILE 4 OZ W/LID (100EA/CA)

## (undated) DEVICE — KIT ANESTHESIA W/CIRCUIT & 3/LT BAG W/FILTER (20EA/CA)

## (undated) DEVICE — CONTAINER, SPECIMEN, STERILE

## (undated) DEVICE — CATHETER IV 20 GA X 1-1/4 ---SURG.& SDS ONLY--- (50EA/BX)

## (undated) DEVICE — CAPTIVATOR II-15MM ROUND STIFF  (40/BX)

## (undated) DEVICE — MANIFOLD NEPTUNE 1 PORT (20/PK)

## (undated) DEVICE — BITE BLOCK ADULT 60FR (100EA/CA)

## (undated) DEVICE — CANISTER SUCTION RIGID RED 1500CC (40EA/CA)

## (undated) DEVICE — TUBE CONNECTING SUCTION - CLEAR PLASTIC STERILE 72 IN (50EA/CA)

## (undated) DEVICE — TOWEL STOP TIMEOUT SAFETY FLAG (40EA/CA)

## (undated) DEVICE — KIT CUSTOM PROCEDURE SINGLE FOR ENDO  (15/CA)